# Patient Record
Sex: FEMALE | Race: OTHER | HISPANIC OR LATINO | URBAN - METROPOLITAN AREA
[De-identification: names, ages, dates, MRNs, and addresses within clinical notes are randomized per-mention and may not be internally consistent; named-entity substitution may affect disease eponyms.]

---

## 2018-08-16 ENCOUNTER — OUTPATIENT (OUTPATIENT)
Dept: OUTPATIENT SERVICES | Facility: HOSPITAL | Age: 58
LOS: 1 days | End: 2018-08-16
Payer: COMMERCIAL

## 2018-08-16 VITALS
DIASTOLIC BLOOD PRESSURE: 87 MMHG | SYSTOLIC BLOOD PRESSURE: 135 MMHG | RESPIRATION RATE: 16 BRPM | HEART RATE: 78 BPM | WEIGHT: 194.01 LBS | TEMPERATURE: 96 F | HEIGHT: 63 IN

## 2018-08-16 DIAGNOSIS — Z01.818 ENCOUNTER FOR OTHER PREPROCEDURAL EXAMINATION: ICD-10-CM

## 2018-08-16 DIAGNOSIS — Z29.9 ENCOUNTER FOR PROPHYLACTIC MEASURES, UNSPECIFIED: ICD-10-CM

## 2018-08-16 DIAGNOSIS — Z98.890 OTHER SPECIFIED POSTPROCEDURAL STATES: Chronic | ICD-10-CM

## 2018-08-16 DIAGNOSIS — N92.6 IRREGULAR MENSTRUATION, UNSPECIFIED: ICD-10-CM

## 2018-08-16 LAB
ALBUMIN SERPL ELPH-MCNC: 4.6 G/DL — SIGNIFICANT CHANGE UP (ref 3.3–5.2)
ALP SERPL-CCNC: 79 U/L — SIGNIFICANT CHANGE UP (ref 40–120)
ALT FLD-CCNC: 16 U/L — SIGNIFICANT CHANGE UP
ANION GAP SERPL CALC-SCNC: 14 MMOL/L — SIGNIFICANT CHANGE UP (ref 5–17)
APTT BLD: 32 SEC — SIGNIFICANT CHANGE UP (ref 27.5–37.4)
AST SERPL-CCNC: 18 U/L — SIGNIFICANT CHANGE UP
BILIRUB SERPL-MCNC: 0.4 MG/DL — SIGNIFICANT CHANGE UP (ref 0.4–2)
BUN SERPL-MCNC: 20 MG/DL — SIGNIFICANT CHANGE UP (ref 8–20)
CALCIUM SERPL-MCNC: 9.8 MG/DL — SIGNIFICANT CHANGE UP (ref 8.6–10.2)
CHLORIDE SERPL-SCNC: 101 MMOL/L — SIGNIFICANT CHANGE UP (ref 98–107)
CO2 SERPL-SCNC: 27 MMOL/L — SIGNIFICANT CHANGE UP (ref 22–29)
CREAT SERPL-MCNC: 0.68 MG/DL — SIGNIFICANT CHANGE UP (ref 0.5–1.3)
GLUCOSE SERPL-MCNC: 96 MG/DL — SIGNIFICANT CHANGE UP (ref 70–115)
HCT VFR BLD CALC: 39.3 % — SIGNIFICANT CHANGE UP (ref 37–47)
HGB BLD-MCNC: 12.6 G/DL — SIGNIFICANT CHANGE UP (ref 12–16)
INR BLD: 1.1 RATIO — SIGNIFICANT CHANGE UP (ref 0.88–1.16)
MCHC RBC-ENTMCNC: 27.3 PG — SIGNIFICANT CHANGE UP (ref 27–31)
MCHC RBC-ENTMCNC: 32.1 G/DL — SIGNIFICANT CHANGE UP (ref 32–36)
MCV RBC AUTO: 85.1 FL — SIGNIFICANT CHANGE UP (ref 81–99)
PLATELET # BLD AUTO: 227 K/UL — SIGNIFICANT CHANGE UP (ref 150–400)
POTASSIUM SERPL-MCNC: 4.2 MMOL/L — SIGNIFICANT CHANGE UP (ref 3.5–5.3)
POTASSIUM SERPL-SCNC: 4.2 MMOL/L — SIGNIFICANT CHANGE UP (ref 3.5–5.3)
PROT SERPL-MCNC: 7.9 G/DL — SIGNIFICANT CHANGE UP (ref 6.6–8.7)
PROTHROM AB SERPL-ACNC: 12.1 SEC — SIGNIFICANT CHANGE UP (ref 9.8–12.7)
RBC # BLD: 4.62 M/UL — SIGNIFICANT CHANGE UP (ref 4.4–5.2)
RBC # FLD: 13.9 % — SIGNIFICANT CHANGE UP (ref 11–15.6)
SODIUM SERPL-SCNC: 142 MMOL/L — SIGNIFICANT CHANGE UP (ref 135–145)
WBC # BLD: 7 K/UL — SIGNIFICANT CHANGE UP (ref 4.8–10.8)
WBC # FLD AUTO: 7 K/UL — SIGNIFICANT CHANGE UP (ref 4.8–10.8)

## 2018-08-16 PROCEDURE — 80053 COMPREHEN METABOLIC PANEL: CPT

## 2018-08-16 PROCEDURE — 93005 ELECTROCARDIOGRAM TRACING: CPT

## 2018-08-16 PROCEDURE — 85610 PROTHROMBIN TIME: CPT

## 2018-08-16 PROCEDURE — G0463: CPT

## 2018-08-16 PROCEDURE — 85027 COMPLETE CBC AUTOMATED: CPT

## 2018-08-16 PROCEDURE — 85730 THROMBOPLASTIN TIME PARTIAL: CPT

## 2018-08-16 PROCEDURE — 36415 COLL VENOUS BLD VENIPUNCTURE: CPT

## 2018-08-16 PROCEDURE — 93010 ELECTROCARDIOGRAM REPORT: CPT

## 2018-08-16 NOTE — H&P PST ADULT - NEGATIVE ENMT SYMPTOMS
no dry mouth/no ear pain/no recurrent cold sores/no vertigo/no sinus symptoms/no throat pain/no dysphagia/no nasal congestion/no nasal obstruction/no hearing difficulty/no abnormal taste sensation/no gum bleeding/no tinnitus/no nasal discharge/no post-nasal discharge/no nose bleeds

## 2018-08-16 NOTE — H&P PST ADULT - NEGATIVE NEUROLOGICAL SYMPTOMS
no loss of consciousness/no confusion/no focal seizures/no headache/no tremors/no loss of sensation/no hemiparesis/no weakness/no generalized seizures/no difficulty walking/no transient paralysis/no paresthesias/no syncope/no vertigo/no facial palsy

## 2018-08-16 NOTE — H&P PST ADULT - NEGATIVE PSYCHIATRIC SYMPTOMS
no suicidal ideation/no memory loss/no mood swings/no agitation/no auditory hallucinations/no hyperactivity/no depression/no visual hallucinations/no insomnia/no paranoia/no anxiety

## 2018-08-16 NOTE — H&P PST ADULT - HISTORY OF PRESENT ILLNESS
59 y/o female with c/o post menopausal bleeding and pelvic pain now presents for dilation and curettage

## 2018-08-16 NOTE — H&P PST ADULT - GASTROINTESTINAL DETAILS
soft/no guarding/nontender/no distention/normal/no bruit/no rebound tenderness/no rigidity/no organomegaly/bowel sounds normal/no masses palpable

## 2018-08-16 NOTE — H&P PST ADULT - NEGATIVE GENERAL SYMPTOMS
no weight loss/no sweating/no fatigue/no polydipsia/no polyphagia/no polyuria/no malaise/no chills/no anorexia/no fever/no weight gain

## 2018-08-16 NOTE — H&P PST ADULT - NEGATIVE OPHTHALMOLOGIC SYMPTOMS
no discharge L/no lacrimation L/no irritation L/no loss of vision R/no photophobia/no diplopia/no lacrimation R/no pain R/no loss of vision L/no scleral injection L/no scleral injection R/no irritation R/no blurred vision R/no discharge R/no pain L/no blurred vision L

## 2018-08-16 NOTE — H&P PST ADULT - NEGATIVE MUSCULOSKELETAL SYMPTOMS
no stiffness/no arm pain R/no leg pain R/no arthralgia/no arthritis/no muscle cramps/no leg pain L/no muscle weakness/no neck pain/no back pain/no joint swelling/no myalgia/no arm pain L

## 2018-08-16 NOTE — H&P PST ADULT - NEGATIVE GASTROINTESTINAL SYMPTOMS
no jaundice/no constipation/no diarrhea/no nausea/no abdominal pain/no hematochezia/no vomiting/no flatulence/no steatorrhea/no change in bowel habits

## 2018-08-16 NOTE — H&P PST ADULT - NEGATIVE GENERAL GENITOURINARY SYMPTOMS
no flank pain R/no dysuria/no urinary hesitancy/normal urinary frequency/no flank pain L/no nocturia/no incontinence/no bladder infections/no renal colic/no urine discoloration/no gas in urine/no hematuria

## 2018-08-16 NOTE — H&P PST ADULT - RS GEN PE MLT RESP DETAILS PC
airway patent/clear to auscultation bilaterally/no rhonchi/no intercostal retractions/no rales/respirations non-labored/breath sounds equal/no wheezes/no subcutaneous emphysema/no chest wall tenderness/normal/good air movement

## 2018-08-16 NOTE — H&P PST ADULT - PMH
Esophageal ulcer    GERD (gastroesophageal reflux disease)    H/O hiatal hernia    HTN (hypertension)

## 2018-08-16 NOTE — H&P PST ADULT - ASSESSMENT
postmenopausal bleeding  CAPRINI SCORE    AGE RELATED RISK FACTORS                                                       MOBILITY RELATED FACTORS  [x ] Age 41-60 years                                            (1 Point)                  [ ] Bed rest                                                        (1 Point)  [ ] Age: 61-74 years                                           (2 Points)                [ ] Plaster cast                                                   (2 Points)  [ ] Age= 75 years                                              (3 Points)                 [ ] Bed bound for more than 72 hours                   (2 Points)    DISEASE RELATED RISK FACTORS                                               GENDER SPECIFIC FACTORS  [ ] Edema in the lower extremities                       (1 Point)                  [ ] Pregnancy                                                     (1 Point)  [ ] Varicose veins                                               (1 Point)                  [ ] Post-partum < 6 weeks                                   (1 Point)             [x ] BMI > 25 Kg/m2                                            (1 Point)                  [ ] Hormonal therapy  or oral contraception            (1 Point)                 [ ] Sepsis (in the previous month)                        (1 Point)                  [ ] History of pregnancy complications  [ ] Pneumonia or serious lung disease                                               [ ] Unexplained or recurrent                       (1 Point)           (in the previous month)                               (1 Point)  [ ] Abnormal pulmonary function test                     (1 Point)                 SURGERY RELATED RISK FACTORS  [ ] Acute myocardial infarction                              (1 Point)                 [ ]  Section                                            (1 Point)  [ ] Congestive heart failure (in the previous month)  (1 Point)                 [ x] Minor surgery                                                 (1 Point)   [ ] Inflammatory bowel disease                             (1 Point)                 [ ] Arthroscopic surgery                                        (2 Points)  [ ] Central venous access                                    (2 Points)                [ ] General surgery lasting more than 45 minutes   (2 Points)       [ ] Stroke (in the previous month)                          (5 Points)               [ ] Elective arthroplasty                                        (5 Points)                                                                                                                                               HEMATOLOGY RELATED FACTORS                                                 TRAUMA RELATED RISK FACTORS  [ ] Prior episodes of VTE                                     (3 Points)                 [ ] Fracture of the hip, pelvis, or leg                       (5 Points)  [ ] Positive family history for VTE                         (3 Points)                 [ ] Acute spinal cord injury (in the previous month)  (5 Points)  [ ] Prothrombin 17693 A                                      (3 Points)                 [ ] Paralysis  (less than 1 month)                          (5 Points)  [ ] Factor V Leiden                                             (3 Points)                 [ ] Multiple Trauma within 1 month                         (5 Points)  [ ] Lupus anticoagulants                                     (3 Points)                                                           [ ] Anticardiolipin antibodies                                (3 Points)                                                       [ ] High homocysteine in the blood                      (3 Points)                                             [ ] Other congenital or acquired thrombophilia       (3 Points)                                                [ ] Heparin induced thrombocytopenia                  (3 Points)                                          Total Score [       3   ]

## 2018-08-16 NOTE — H&P PST ADULT - NEGATIVE BREAST SYMPTOMS
no nipple discharge R/no breast tenderness L/no breast tenderness R/no nipple discharge L/no breast lump L/no breast lump R

## 2018-08-16 NOTE — H&P PST ADULT - MUSCULOSKELETAL
details… detailed exam no joint erythema/no joint warmth/no calf tenderness/no joint swelling/normal/ROM intact/normal strength

## 2018-08-16 NOTE — H&P PST ADULT - NEGATIVE SKIN SYMPTOMS
no itching/no brittle nails/no change in size/color of mole/no rash/no dryness/no tumor/no hair loss/no pitted nails

## 2018-08-16 NOTE — H&P PST ADULT - NSANTHOSAYNRD_GEN_A_CORE
No. MONTY screening performed.  STOP BANG Legend: 0-2 = LOW Risk; 3-4 = INTERMEDIATE Risk; 5-8 = HIGH Risk

## 2018-08-16 NOTE — H&P PST ADULT - NEUROLOGICAL DETAILS
alert and oriented x 3/responds to pain/normal strength/responds to verbal commands/deep reflexes intact/no spontaneous movement/superficial reflexes intact/sensation intact/cranial nerves intact

## 2018-08-16 NOTE — H&P PST ADULT - NEGATIVE CARDIOVASCULAR SYMPTOMS
no paroxysmal nocturnal dyspnea/no claudication/no chest pain/no dyspnea on exertion/no orthopnea/no palpitations/no peripheral edema

## 2018-08-21 ENCOUNTER — TRANSCRIPTION ENCOUNTER (OUTPATIENT)
Age: 58
End: 2018-08-21

## 2018-08-22 ENCOUNTER — RESULT REVIEW (OUTPATIENT)
Age: 58
End: 2018-08-22

## 2018-08-22 ENCOUNTER — OUTPATIENT (OUTPATIENT)
Dept: OUTPATIENT SERVICES | Facility: HOSPITAL | Age: 58
LOS: 1 days | Discharge: HOME | End: 2018-08-22
Payer: COMMERCIAL

## 2018-08-22 VITALS
HEART RATE: 56 BPM | OXYGEN SATURATION: 100 % | DIASTOLIC BLOOD PRESSURE: 65 MMHG | RESPIRATION RATE: 16 BRPM | SYSTOLIC BLOOD PRESSURE: 135 MMHG

## 2018-08-22 VITALS
DIASTOLIC BLOOD PRESSURE: 73 MMHG | WEIGHT: 190.04 LBS | RESPIRATION RATE: 16 BRPM | HEART RATE: 58 BPM | HEIGHT: 64 IN | SYSTOLIC BLOOD PRESSURE: 121 MMHG | TEMPERATURE: 98 F | OXYGEN SATURATION: 100 %

## 2018-08-22 DIAGNOSIS — Z98.890 OTHER SPECIFIED POSTPROCEDURAL STATES: Chronic | ICD-10-CM

## 2018-08-22 DIAGNOSIS — Z01.818 ENCOUNTER FOR OTHER PREPROCEDURAL EXAMINATION: ICD-10-CM

## 2018-08-22 DIAGNOSIS — N92.6 IRREGULAR MENSTRUATION, UNSPECIFIED: ICD-10-CM

## 2018-08-22 PROCEDURE — 58120 DILATION AND CURETTAGE: CPT

## 2018-08-22 PROCEDURE — 88305 TISSUE EXAM BY PATHOLOGIST: CPT

## 2018-08-22 PROCEDURE — 88305 TISSUE EXAM BY PATHOLOGIST: CPT | Mod: 26

## 2018-08-22 RX ORDER — KETOROLAC TROMETHAMINE 30 MG/ML
15 SYRINGE (ML) INJECTION ONCE
Qty: 0 | Refills: 0 | Status: DISCONTINUED | OUTPATIENT
Start: 2018-08-22 | End: 2018-08-22

## 2018-08-22 RX ORDER — ONDANSETRON 8 MG/1
4 TABLET, FILM COATED ORAL ONCE
Qty: 0 | Refills: 0 | Status: DISCONTINUED | OUTPATIENT
Start: 2018-08-22 | End: 2018-08-22

## 2018-08-22 RX ORDER — OXYCODONE HYDROCHLORIDE 5 MG/1
5 TABLET ORAL ONCE
Qty: 0 | Refills: 0 | Status: DISCONTINUED | OUTPATIENT
Start: 2018-08-22 | End: 2018-08-22

## 2018-08-22 RX ORDER — RANITIDINE HYDROCHLORIDE 150 MG/1
1 TABLET, FILM COATED ORAL
Qty: 0 | Refills: 0 | COMMUNITY

## 2018-08-22 RX ORDER — SODIUM CHLORIDE 9 MG/ML
1000 INJECTION, SOLUTION INTRAVENOUS
Qty: 0 | Refills: 0 | Status: DISCONTINUED | OUTPATIENT
Start: 2018-08-22 | End: 2018-08-22

## 2018-08-22 RX ORDER — IBUPROFEN 200 MG
1 TABLET ORAL
Qty: 21 | Refills: 1
Start: 2018-08-22 | End: 2018-09-04

## 2018-08-22 RX ORDER — FENTANYL CITRATE 50 UG/ML
50 INJECTION INTRAVENOUS
Qty: 0 | Refills: 0 | Status: DISCONTINUED | OUTPATIENT
Start: 2018-08-22 | End: 2018-08-22

## 2018-08-22 RX ORDER — HYDRALAZINE/HYDROCHLOROTHIAZID 50 MG-50MG
0 CAPSULE ORAL
Qty: 0 | Refills: 0 | COMMUNITY

## 2018-08-22 RX ORDER — FENTANYL CITRATE 50 UG/ML
25 INJECTION INTRAVENOUS
Qty: 0 | Refills: 0 | Status: DISCONTINUED | OUTPATIENT
Start: 2018-08-22 | End: 2018-08-22

## 2018-08-22 NOTE — ASU DISCHARGE PLAN (ADULT/PEDIATRIC). - MEDICATION SUMMARY - MEDICATIONS TO TAKE
I will START or STAY ON the medications listed below when I get home from the hospital:    ibuprofen 600 mg oral tablet  -- 1 tab(s) by mouth every 8 hours  -- Do not take this drug if you are pregnant.  It is very important that you take or use this exactly as directed.  Do not skip doses or discontinue unless directed by your doctor.  May cause drowsiness or dizziness.  Obtain medical advice before taking any non-prescription drugs as some may affect the action of this medication.  Take with food or milk.    -- Indication: For pain    Diovan  mg-12.5 mg oral tablet  -- 1 tab(s) by mouth once a day  -- Indication: For prior    Norvasc 5 mg oral tablet  -- 1 tab(s) by mouth once a day  -- Indication: For prior    hydroCHLOROthiazide 25 mg oral tablet  -- 1 tab(s) by mouth once a day  -- Indication: For prior

## 2018-08-24 LAB — SURGICAL PATHOLOGY FINAL REPORT - CH: SIGNIFICANT CHANGE UP

## 2018-10-24 NOTE — ASU PATIENT PROFILE, ADULT - PATIENT KNOW
Bridgett Albrecht   Preferred Name:   None  Female, 49 year old, 1968  Phone:   *[phone] (Mobile) [phone] (Home Phone) [phone] (Work Phone)    RX   Received: Today   Message Contents   Doe MOREIRA Oac Nurse Review Pool Cc: Doe Hutchinson             Hi,       Pt is scheduled for colonoscopy on 12/10/2018 with Dr. Jones.   [facility] [address] [place] [address] [address]  AT [facility] [place] [address]       Thanks,   Surgery Scheduling Dept         yes

## 2019-09-09 PROBLEM — I10 ESSENTIAL (PRIMARY) HYPERTENSION: Chronic | Status: ACTIVE | Noted: 2018-08-16

## 2019-09-09 PROBLEM — Z87.19 PERSONAL HISTORY OF OTHER DISEASES OF THE DIGESTIVE SYSTEM: Chronic | Status: ACTIVE | Noted: 2018-08-16

## 2019-09-19 ENCOUNTER — APPOINTMENT (OUTPATIENT)
Dept: THORACIC SURGERY | Facility: CLINIC | Age: 59
End: 2019-09-19
Payer: COMMERCIAL

## 2019-09-19 VITALS
OXYGEN SATURATION: 97 % | BODY MASS INDEX: 34.15 KG/M2 | RESPIRATION RATE: 16 BRPM | WEIGHT: 200 LBS | SYSTOLIC BLOOD PRESSURE: 157 MMHG | HEART RATE: 64 BPM | DIASTOLIC BLOOD PRESSURE: 82 MMHG | HEIGHT: 64 IN

## 2019-09-19 DIAGNOSIS — F06.4 ANXIETY DISORDER DUE TO KNOWN PHYSIOLOGICAL CONDITION: ICD-10-CM

## 2019-09-19 DIAGNOSIS — D86.9 SARCOIDOSIS, UNSPECIFIED: ICD-10-CM

## 2019-09-19 DIAGNOSIS — Z87.891 PERSONAL HISTORY OF NICOTINE DEPENDENCE: ICD-10-CM

## 2019-09-19 PROCEDURE — 99205 OFFICE O/P NEW HI 60 MIN: CPT

## 2019-09-23 NOTE — ASSESSMENT
[FreeTextEntry1] : Ms. DAYSI LIU, 59 year old female, former smoker, w/ hx of HTN, anxiety, sarcoidosis, chronic GERD followed by Dr. Lyle, c/o pain when eating, epigastric pain, bloating, N/V and only eating 1 meal a day. Taking Carafate, Pantoprazole and Zantac but without good relief.\par \par EGD on 8/22/19 showed Franco's esophagus and hiatal hernia at EG junction. Path of EG junction revealed columnar mucosa with no significant histopathology. No intestinal cell metaplasia. Alcian blue/PAS stain is negative. \par \par I have reviewed the patient's medical records and diagnostic images at time of this office consultation and have made the following recommendation:\par 1. A CT Chest w/out contrast\par 2. A Manometry Study with Dr. Verito Ramos\par 3. A Barium Swallow\par 4. I recommended an EGD, Robotic-assisted, Lap, repair of hiatal hernia, Nissen fundoplication on 10/9/19. Risks and benefits and alternatives explained to patient, all questions answered, patient agreed to proceed with surgery.\par 5. Medical clearance\par \par \par Written by Meek Jackman NP, acting as a scribe for Dr. Malik Gallagher. \par \par The documentation recorded by the scribe accurately reflects the service I personally performed and the decisions made by me. MALIK GALLAGHER MD\par

## 2019-09-23 NOTE — HISTORY OF PRESENT ILLNESS
[FreeTextEntry1] : Ms. DAYSI LIU, 59 year old female, former smoker, w/ hx of HTN, anxiety, sarcoidosis, chronic GERD followed by Dr. Lyle, c/o pain when eating, epigastric pain, bloating, N/V and only eating 1 meal a day. Taking Carafate, Pantoprazole and Zantac but without good relief.\par \par EGD on 8/22/19 showed Franco's esophagus and hiatal hernia at EG junction. Path of EG junction revealed columnar mucosa with no significant histopathology. No intestinal cell metaplasia. Alcian blue/PAS stain is negative. \par \par Pt presents today for CT Sx consultation, referred by Dr. Renato Lyle.\par

## 2019-09-23 NOTE — CONSULT LETTER
[Dear  ___] : Dear  [unfilled], [Consult Letter:] : I had the pleasure of evaluating your patient, [unfilled]. [( Thank you for referring [unfilled] for consultation for _____ )] : Thank you for referring [unfilled] for consultation for [unfilled] [Please see my note below.] : Please see my note below. [Sincerely,] : Sincerely, [Consult Closing:] : Thank you very much for allowing me to participate in the care of this patient.  If you have any questions, please do not hesitate to contact me. [DrLan  ___] : Dr. WEAVER [DrLan ___] : Dr. WEAVER [FreeTextEntry2] : Dr. Renato Lyle (GI/ref)\par Ivana Lentz MD (PCP)\par Jayla Garcia MD (Hem/Onc) [FreeTextEntry3] : Malik Gallagher MD, MPH \par System Director of Thoracic Surgery \par Director of Comprehensive Lung and Foregut [place] \par Professor Cardiovascular & Thoracic Surgery  \par [facility] [employer] at [facility]\par

## 2019-09-23 NOTE — DATA REVIEWED
[FreeTextEntry1] : EGD on 8/22/19 showed Franco's esophagus and hiatal hernia at EG junction. Path of EG junction revealed columnar mucosa with no significant histopathology. No intestinal cell metaplasia. Alcian blue/PAS stain is negative. \par

## 2019-09-23 NOTE — PHYSICAL EXAM
[General Appearance - In No Acute Distress] : in no acute distress [General Appearance - Alert] : alert [Extraocular Movements] : extraocular movements were intact [PERRL With Normal Accommodation] : pupils were equal in size, round, and reactive to light [Sclera] : the sclera and conjunctiva were normal [Outer Ear] : the ears and nose were normal in appearance [Oropharynx] : the oropharynx was normal [Neck Appearance] : the appearance of the neck was normal [Neck Cervical Mass (___cm)] : no neck mass was observed [Jugular Venous Distention Increased] : there was no jugular-venous distention [Thyroid Nodule] : there were no palpable thyroid nodules [Thyroid Diffuse Enlargement] : the thyroid was not enlarged [Auscultation Breath Sounds / Voice Sounds] : lungs were clear to auscultation bilaterally [Heart Sounds] : normal S1 and S2 [Heart Rate And Rhythm] : heart rate was normal and rhythm regular [Murmurs] : no murmurs [Heart Sounds Gallop] : no gallops [Heart Sounds Pericardial Friction Rub] : no pericardial rub [Chest Visual Inspection Thoracic Asymmetry] : no chest asymmetry [Examination Of The Chest] : the chest was normal in appearance [Diminished Respiratory Excursion] : normal chest expansion [2+] : left 2+ [Breast Appearance] : normal in appearance [Breast Palpation Mass] : no palpable masses [Abdomen Soft] : soft [Bowel Sounds] : normal bowel sounds [Abdomen Tenderness] : non-tender [Abdomen Mass (___ Cm)] : no abdominal mass palpated [Cervical Lymph Nodes Enlarged Posterior Bilaterally] : posterior cervical [Supraclavicular Lymph Nodes Enlarged Bilaterally] : supraclavicular [Cervical Lymph Nodes Enlarged Anterior Bilaterally] : anterior cervical [No CVA Tenderness] : no ~M costovertebral angle tenderness [No Spinal Tenderness] : no spinal tenderness [Abnormal Walk] : normal gait [Musculoskeletal - Swelling] : no joint swelling seen [Nail Clubbing] : no clubbing  or cyanosis of the fingernails [Motor Tone] : muscle strength and tone were normal [Skin Color & Pigmentation] : normal skin color and pigmentation [Skin Turgor] : normal skin turgor [Deep Tendon Reflexes (DTR)] : deep tendon reflexes were 2+ and symmetric [] : no rash [Sensation] : the sensory exam was normal to light touch and pinprick [No Focal Deficits] : no focal deficits [Oriented To Time, Place, And Person] : oriented to person, place, and time [Affect] : the affect was normal [Impaired Insight] : insight and judgment were intact [FreeTextEntry1] : deferred

## 2019-09-27 ENCOUNTER — OTHER (OUTPATIENT)
Age: 59
End: 2019-09-27

## 2019-09-30 ENCOUNTER — OTHER (OUTPATIENT)
Age: 59
End: 2019-09-30

## 2019-09-30 ENCOUNTER — OUTPATIENT (OUTPATIENT)
Dept: OUTPATIENT SERVICES | Facility: HOSPITAL | Age: 59
LOS: 1 days | End: 2019-09-30
Payer: COMMERCIAL

## 2019-09-30 VITALS
HEIGHT: 63 IN | TEMPERATURE: 97 F | OXYGEN SATURATION: 99 % | HEART RATE: 97 BPM | DIASTOLIC BLOOD PRESSURE: 86 MMHG | RESPIRATION RATE: 16 BRPM | WEIGHT: 192.9 LBS | SYSTOLIC BLOOD PRESSURE: 136 MMHG

## 2019-09-30 DIAGNOSIS — Z98.890 OTHER SPECIFIED POSTPROCEDURAL STATES: Chronic | ICD-10-CM

## 2019-09-30 DIAGNOSIS — K44.9 DIAPHRAGMATIC HERNIA WITHOUT OBSTRUCTION OR GANGRENE: ICD-10-CM

## 2019-09-30 DIAGNOSIS — R58 HEMORRHAGE, NOT ELSEWHERE CLASSIFIED: Chronic | ICD-10-CM

## 2019-09-30 DIAGNOSIS — K21.9 GASTRO-ESOPHAGEAL REFLUX DISEASE WITHOUT ESOPHAGITIS: ICD-10-CM

## 2019-09-30 DIAGNOSIS — Z01.818 ENCOUNTER FOR OTHER PREPROCEDURAL EXAMINATION: ICD-10-CM

## 2019-09-30 LAB
ALBUMIN SERPL ELPH-MCNC: 4.6 G/DL — SIGNIFICANT CHANGE UP (ref 3.3–5)
ALP SERPL-CCNC: 77 U/L — SIGNIFICANT CHANGE UP (ref 40–120)
ALT FLD-CCNC: 9 U/L — SIGNIFICANT CHANGE UP (ref 4–33)
ANION GAP SERPL CALC-SCNC: 15 MMO/L — HIGH (ref 7–14)
AST SERPL-CCNC: 10 U/L — SIGNIFICANT CHANGE UP (ref 4–32)
BILIRUB DIRECT SERPL-MCNC: < 0.2 MG/DL — SIGNIFICANT CHANGE UP (ref 0.1–0.2)
BILIRUB SERPL-MCNC: 0.3 MG/DL — SIGNIFICANT CHANGE UP (ref 0.2–1.2)
BLD GP AB SCN SERPL QL: NEGATIVE — SIGNIFICANT CHANGE UP
BUN SERPL-MCNC: 11 MG/DL — SIGNIFICANT CHANGE UP (ref 7–23)
CALCIUM SERPL-MCNC: 9.5 MG/DL — SIGNIFICANT CHANGE UP (ref 8.4–10.5)
CHLORIDE SERPL-SCNC: 106 MMOL/L — SIGNIFICANT CHANGE UP (ref 98–107)
CO2 SERPL-SCNC: 24 MMOL/L — SIGNIFICANT CHANGE UP (ref 22–31)
CREAT SERPL-MCNC: 0.65 MG/DL — SIGNIFICANT CHANGE UP (ref 0.5–1.3)
GLUCOSE SERPL-MCNC: 76 MG/DL — SIGNIFICANT CHANGE UP (ref 70–99)
HBA1C BLD-MCNC: 5.6 % — SIGNIFICANT CHANGE UP (ref 4–5.6)
HCT VFR BLD CALC: 38.6 % — SIGNIFICANT CHANGE UP (ref 34.5–45)
HGB BLD-MCNC: 12.2 G/DL — SIGNIFICANT CHANGE UP (ref 11.5–15.5)
MCHC RBC-ENTMCNC: 27.4 PG — SIGNIFICANT CHANGE UP (ref 27–34)
MCHC RBC-ENTMCNC: 31.6 % — LOW (ref 32–36)
MCV RBC AUTO: 86.5 FL — SIGNIFICANT CHANGE UP (ref 80–100)
NRBC # FLD: 0 K/UL — SIGNIFICANT CHANGE UP (ref 0–0)
PLATELET # BLD AUTO: 219 K/UL — SIGNIFICANT CHANGE UP (ref 150–400)
PMV BLD: 9.7 FL — SIGNIFICANT CHANGE UP (ref 7–13)
POTASSIUM SERPL-MCNC: 4.1 MMOL/L — SIGNIFICANT CHANGE UP (ref 3.5–5.3)
POTASSIUM SERPL-SCNC: 4.1 MMOL/L — SIGNIFICANT CHANGE UP (ref 3.5–5.3)
PROT SERPL-MCNC: 7.5 G/DL — SIGNIFICANT CHANGE UP (ref 6–8.3)
RBC # BLD: 4.46 M/UL — SIGNIFICANT CHANGE UP (ref 3.8–5.2)
RBC # FLD: 13.9 % — SIGNIFICANT CHANGE UP (ref 10.3–14.5)
RH IG SCN BLD-IMP: POSITIVE — SIGNIFICANT CHANGE UP
SODIUM SERPL-SCNC: 145 MMOL/L — SIGNIFICANT CHANGE UP (ref 135–145)
WBC # BLD: 7.68 K/UL — SIGNIFICANT CHANGE UP (ref 3.8–10.5)
WBC # FLD AUTO: 7.68 K/UL — SIGNIFICANT CHANGE UP (ref 3.8–10.5)

## 2019-09-30 PROCEDURE — 93010 ELECTROCARDIOGRAM REPORT: CPT

## 2019-09-30 RX ORDER — AMLODIPINE BESYLATE 2.5 MG/1
1 TABLET ORAL
Qty: 0 | Refills: 0 | DISCHARGE

## 2019-09-30 NOTE — H&P PST ADULT - NSICDXPASTSURGICALHX_GEN_ALL_CORE_FT
PAST SURGICAL HISTORY:  Bleeding uterine ablation    H/O thyroid cyst     S/P bilateral breast reduction     S/P  Section X2

## 2019-09-30 NOTE — H&P PST ADULT - NSICDXPASTMEDICALHX_GEN_ALL_CORE_FT
PAST MEDICAL HISTORY:  Franco's esophagus     Esophageal ulcer     GERD (gastroesophageal reflux disease)     H/O hiatal hernia     HTN (hypertension)     Miscarriage x 2

## 2019-09-30 NOTE — H&P PST ADULT - HISTORY OF PRESENT ILLNESS
This is a 60 y/o female who presents with h/o robles's esophagus and progressively worsening symptomatology of hiatal hernia confirmed on endoscopy. Scheduled for upper endoscopy, robotic assisted lap hiatal hernia repair, Nissen fundoplication on 10-9-10

## 2019-09-30 NOTE — H&P PST ADULT - NSICDXPROBLEM_GEN_ALL_CORE_FT
PROBLEM DIAGNOSES  Problem: Chronic GERD  Assessment and Plan: PROBLEM DIAGNOSES  Problem: Chronic GERD  Assessment and Plan: This is a 58 y/o female who is scheduled for upper endoscopy, robotic assisted lap hiatal hernia repair Nissen fundoplication on 10-9-19  * Given preop and cleanser instructions with good teach back and patient verbalized understanding  * Instructed to take normal am dose of amlodipine, valsartan, and ranitidine the am of surgery

## 2019-10-04 ENCOUNTER — APPOINTMENT (OUTPATIENT)
Dept: GASTROENTEROLOGY | Facility: HOSPITAL | Age: 59
End: 2019-10-04

## 2019-10-04 ENCOUNTER — OUTPATIENT (OUTPATIENT)
Dept: OUTPATIENT SERVICES | Facility: HOSPITAL | Age: 59
LOS: 1 days | Discharge: HOME | End: 2019-10-04
Payer: COMMERCIAL

## 2019-10-04 DIAGNOSIS — R58 HEMORRHAGE, NOT ELSEWHERE CLASSIFIED: Chronic | ICD-10-CM

## 2019-10-04 DIAGNOSIS — Z98.890 OTHER SPECIFIED POSTPROCEDURAL STATES: Chronic | ICD-10-CM

## 2019-10-04 DIAGNOSIS — K44.9 DIAPHRAGMATIC HERNIA WITHOUT OBSTRUCTION OR GANGRENE: ICD-10-CM

## 2019-10-04 PROCEDURE — 91037 ESOPH IMPED FUNCTION TEST: CPT | Mod: 26,GC

## 2019-10-04 PROCEDURE — 91010 ESOPHAGUS MOTILITY STUDY: CPT | Mod: 26,GC

## 2019-10-08 ENCOUNTER — TRANSCRIPTION ENCOUNTER (OUTPATIENT)
Age: 59
End: 2019-10-08

## 2019-10-08 ENCOUNTER — FORM ENCOUNTER (OUTPATIENT)
Age: 59
End: 2019-10-08

## 2019-10-09 ENCOUNTER — INPATIENT (INPATIENT)
Facility: HOSPITAL | Age: 59
LOS: 0 days | Discharge: HOME | End: 2019-10-10
Attending: THORACIC SURGERY (CARDIOTHORACIC VASCULAR SURGERY) | Admitting: THORACIC SURGERY (CARDIOTHORACIC VASCULAR SURGERY)
Payer: COMMERCIAL

## 2019-10-09 ENCOUNTER — RESULT REVIEW (OUTPATIENT)
Age: 59
End: 2019-10-09

## 2019-10-09 ENCOUNTER — TRANSCRIPTION ENCOUNTER (OUTPATIENT)
Age: 59
End: 2019-10-09

## 2019-10-09 ENCOUNTER — APPOINTMENT (OUTPATIENT)
Dept: THORACIC SURGERY | Facility: HOSPITAL | Age: 59
End: 2019-10-09

## 2019-10-09 VITALS
WEIGHT: 192.9 LBS | DIASTOLIC BLOOD PRESSURE: 70 MMHG | OXYGEN SATURATION: 98 % | SYSTOLIC BLOOD PRESSURE: 137 MMHG | HEIGHT: 63 IN | HEART RATE: 68 BPM | RESPIRATION RATE: 16 BRPM | TEMPERATURE: 98 F

## 2019-10-09 DIAGNOSIS — K44.9 DIAPHRAGMATIC HERNIA WITHOUT OBSTRUCTION OR GANGRENE: ICD-10-CM

## 2019-10-09 DIAGNOSIS — Z98.890 OTHER SPECIFIED POSTPROCEDURAL STATES: Chronic | ICD-10-CM

## 2019-10-09 DIAGNOSIS — R58 HEMORRHAGE, NOT ELSEWHERE CLASSIFIED: Chronic | ICD-10-CM

## 2019-10-09 LAB — RH IG SCN BLD-IMP: POSITIVE — SIGNIFICANT CHANGE UP

## 2019-10-09 PROCEDURE — 74220 X-RAY XM ESOPHAGUS 1CNTRST: CPT | Mod: 26

## 2019-10-09 PROCEDURE — S2900 ROBOTIC SURGICAL SYSTEM: CPT | Mod: NC

## 2019-10-09 PROCEDURE — 71045 X-RAY EXAM CHEST 1 VIEW: CPT | Mod: 26

## 2019-10-09 PROCEDURE — 43281 LAP PARAESOPHAG HERN REPAIR: CPT

## 2019-10-09 PROCEDURE — 43235 EGD DIAGNOSTIC BRUSH WASH: CPT | Mod: 59

## 2019-10-09 PROCEDURE — 88302 TISSUE EXAM BY PATHOLOGIST: CPT | Mod: 26

## 2019-10-09 RX ORDER — METOCLOPRAMIDE HCL 10 MG
10 TABLET ORAL ONCE
Refills: 0 | Status: COMPLETED | OUTPATIENT
Start: 2019-10-09 | End: 2019-10-09

## 2019-10-09 RX ORDER — SODIUM CHLORIDE 9 MG/ML
1000 INJECTION, SOLUTION INTRAVENOUS
Refills: 0 | Status: DISCONTINUED | OUTPATIENT
Start: 2019-10-09 | End: 2019-10-09

## 2019-10-09 RX ORDER — ONDANSETRON 8 MG/1
4 TABLET, FILM COATED ORAL ONCE
Refills: 0 | Status: COMPLETED | OUTPATIENT
Start: 2019-10-09 | End: 2019-10-09

## 2019-10-09 RX ORDER — OXYCODONE HYDROCHLORIDE 5 MG/1
10 TABLET ORAL EVERY 4 HOURS
Refills: 0 | Status: DISCONTINUED | OUTPATIENT
Start: 2019-10-09 | End: 2019-10-10

## 2019-10-09 RX ORDER — OXYCODONE HYDROCHLORIDE 5 MG/1
5 TABLET ORAL EVERY 4 HOURS
Refills: 0 | Status: DISCONTINUED | OUTPATIENT
Start: 2019-10-09 | End: 2019-10-10

## 2019-10-09 RX ORDER — ACETAMINOPHEN 500 MG
1000 TABLET ORAL ONCE
Refills: 0 | Status: COMPLETED | OUTPATIENT
Start: 2019-10-09 | End: 2019-10-09

## 2019-10-09 RX ORDER — FENTANYL CITRATE 50 UG/ML
25 INJECTION INTRAVENOUS
Refills: 0 | Status: DISCONTINUED | OUTPATIENT
Start: 2019-10-09 | End: 2019-10-09

## 2019-10-09 RX ORDER — SODIUM CHLORIDE 9 MG/ML
1000 INJECTION, SOLUTION INTRAVENOUS
Refills: 0 | Status: DISCONTINUED | OUTPATIENT
Start: 2019-10-09 | End: 2019-10-10

## 2019-10-09 RX ORDER — HEPARIN SODIUM 5000 [USP'U]/ML
5000 INJECTION INTRAVENOUS; SUBCUTANEOUS EVERY 12 HOURS
Refills: 0 | Status: DISCONTINUED | OUTPATIENT
Start: 2019-10-09 | End: 2019-10-10

## 2019-10-09 RX ORDER — HYDROMORPHONE HYDROCHLORIDE 2 MG/ML
0.5 INJECTION INTRAMUSCULAR; INTRAVENOUS; SUBCUTANEOUS
Refills: 0 | Status: DISCONTINUED | OUTPATIENT
Start: 2019-10-09 | End: 2019-10-09

## 2019-10-09 RX ORDER — HEPARIN SODIUM 5000 [USP'U]/ML
5000 INJECTION INTRAVENOUS; SUBCUTANEOUS ONCE
Refills: 0 | Status: COMPLETED | OUTPATIENT
Start: 2019-10-09 | End: 2019-10-09

## 2019-10-09 RX ORDER — AMLODIPINE BESYLATE 2.5 MG/1
5 TABLET ORAL DAILY
Refills: 0 | Status: DISCONTINUED | OUTPATIENT
Start: 2019-10-10 | End: 2019-10-10

## 2019-10-09 RX ORDER — SIMETHICONE 80 MG/1
80 TABLET, CHEWABLE ORAL THREE TIMES A DAY
Refills: 0 | Status: DISCONTINUED | OUTPATIENT
Start: 2019-10-09 | End: 2019-10-10

## 2019-10-09 RX ADMIN — ONDANSETRON 4 MILLIGRAM(S): 8 TABLET, FILM COATED ORAL at 18:36

## 2019-10-09 RX ADMIN — HYDROMORPHONE HYDROCHLORIDE 0.5 MILLIGRAM(S): 2 INJECTION INTRAMUSCULAR; INTRAVENOUS; SUBCUTANEOUS at 14:00

## 2019-10-09 RX ADMIN — ONDANSETRON 4 MILLIGRAM(S): 8 TABLET, FILM COATED ORAL at 14:25

## 2019-10-09 RX ADMIN — Medication 10 MILLIGRAM(S): at 20:18

## 2019-10-09 RX ADMIN — SIMETHICONE 80 MILLIGRAM(S): 80 TABLET, CHEWABLE ORAL at 18:36

## 2019-10-09 RX ADMIN — Medication 1000 MILLIGRAM(S): at 21:19

## 2019-10-09 RX ADMIN — SIMETHICONE 80 MILLIGRAM(S): 80 TABLET, CHEWABLE ORAL at 22:49

## 2019-10-09 RX ADMIN — HYDROMORPHONE HYDROCHLORIDE 0.5 MILLIGRAM(S): 2 INJECTION INTRAMUSCULAR; INTRAVENOUS; SUBCUTANEOUS at 13:35

## 2019-10-09 RX ADMIN — OXYCODONE HYDROCHLORIDE 10 MILLIGRAM(S): 5 TABLET ORAL at 18:36

## 2019-10-09 RX ADMIN — Medication 400 MILLIGRAM(S): at 20:19

## 2019-10-09 RX ADMIN — SODIUM CHLORIDE 80 MILLILITER(S): 9 INJECTION, SOLUTION INTRAVENOUS at 13:36

## 2019-10-09 RX ADMIN — HEPARIN SODIUM 5000 UNIT(S): 5000 INJECTION INTRAVENOUS; SUBCUTANEOUS at 09:54

## 2019-10-09 RX ADMIN — SODIUM CHLORIDE 30 MILLILITER(S): 9 INJECTION, SOLUTION INTRAVENOUS at 09:55

## 2019-10-09 NOTE — DISCHARGE NOTE PROVIDER - INSTRUCTIONS
Clear liquid diet  No carbonated beverages Clear liquid diet x 2 more days  then progress to full liquid diet x 3 days  and then progress to soft mechanical diet until f/u with Dr Gallagher    No carbonated beverages  keep head of bed straight up during and after eating  keep head of bed 45* when sleeping  if you experience difficulty swallowing got back to clears and contact thoracic surgery office

## 2019-10-09 NOTE — DISCHARGE NOTE PROVIDER - CARE PROVIDER_API CALL
Malik Gallagher (MD)  Surgery; Thoracic Surgery  [id number] [place] [address]  Phone: [phone]  Fax: [phone]  Follow Up Time:

## 2019-10-09 NOTE — PROGRESS NOTE ADULT - ASSESSMENT
Patient s/p Robotic assisted hiatal hernia repair and nissen fundoplication    Plan:  Pain management- IV Tylenol   Clear liquid diet  IV hydration   Zofran/Reglan for nausea   D/C home in am with adequate pain and nausea management

## 2019-10-09 NOTE — DISCHARGE NOTE PROVIDER - NSDCCPTREATMENT_GEN_ALL_CORE_FT
PRINCIPAL PROCEDURE  Procedure: Repair, hernia, hiatal, with fundoplication  Findings and Treatment:

## 2019-10-09 NOTE — DISCHARGE NOTE PROVIDER - NSDCCPCAREPLAN_GEN_ALL_CORE_FT
PRINCIPAL DISCHARGE DIAGNOSIS  Diagnosis: Franco esophagus  Assessment and Plan of Treatment: S/p Robotic assisted hiatal hernia repair, nissen fundoplication

## 2019-10-09 NOTE — DISCHARGE NOTE PROVIDER - HOSPITAL COURSE
60 y/o female who presents with h/o robles's esophagus and progressively worsening symptomatology of hiatal hernia confirmed on endoscopy.     Patient s/p robotic assisted hiatal hernia repair, nissen fundoplication on 10/9/19, esophragam done and patient started on clear liquid diet.  Post op course patient with post op pain not controlled with po pain medication and nausea requiring IV Zofran and Reglan.  Patient stable for discharge home when nausea resolves and pain managed with po pain medication.

## 2019-10-09 NOTE — PROGRESS NOTE ADULT - SUBJECTIVE AND OBJECTIVE BOX
Patient s/p robotic assisted hiatal hernia repair, nissen fundoplication.  Patient complaining of incisional pain, oxycodone po given with minimal relief noted, patient then received IV Tylenol with pain relief verbalized.  Esophagram done and patient advanced to clear liquid diet, she states after having clear liquid she felt nauseated, IV zofran given with minimal relief, patient remained nauseated and IV Reglan given with relief noted.  Patient continues on IV hydration since she feels nausea with liquids and patient not taking adequate po intake.    Vital Signs Last 24 Hrs  T(C): 36.8 (09 Oct 2019 18:14), Max: 36.8 (09 Oct 2019 18:14)  T(F): 98.2 (09 Oct 2019 18:14), Max: 98.2 (09 Oct 2019 18:14)  HR: 83 (09 Oct 2019 18:14) (56 - 89)  BP: 162/85 (09 Oct 2019 18:14) (108/55 - 162/85)  BP(mean): 85 (09 Oct 2019 16:30) (68 - 107)  RR: 17 (09 Oct 2019 18:14) (8 - 22)  SpO2: 100% (09 Oct 2019 18:14) (93% - 100%)    MEDICATIONS  (STANDING):  lactated ringers. 1000 milliLiter(s) (50 mL/Hr) IV Continuous <Continuous>  simethicone 80 milliGRAM(s) Chew three times a day  MEDICATIONS  (PRN):  oxyCODONE    IR 5 milliGRAM(s) Oral every 4 hours PRN Moderate Pain (4 - 6)  oxyCODONE    IR 10 milliGRAM(s) Oral every 4 hours PRN Severe Pain (7 - 10)

## 2019-10-09 NOTE — DISCHARGE NOTE PROVIDER - NSDCACTIVITY_GEN_ALL_CORE
Showering allowed/Stairs allowed/Walking - Indoors allowed/Do not drive or operate machinery/Do not make important decisions/No heavy lifting/straining

## 2019-10-09 NOTE — PATIENT PROFILE ADULT - NSPROPOAURINARYCATHETER_GEN_A_NUR
How Severe Is Your Skin Lesion?: mild Have Your Skin Lesions Been Treated?: not been treated Is This A New Presentation, Or A Follow-Up?: Skin Lesions no

## 2019-10-10 ENCOUNTER — TRANSCRIPTION ENCOUNTER (OUTPATIENT)
Age: 59
End: 2019-10-10

## 2019-10-10 VITALS
HEART RATE: 80 BPM | TEMPERATURE: 98 F | RESPIRATION RATE: 18 BRPM | DIASTOLIC BLOOD PRESSURE: 80 MMHG | OXYGEN SATURATION: 98 % | SYSTOLIC BLOOD PRESSURE: 161 MMHG

## 2019-10-10 LAB
ANION GAP SERPL CALC-SCNC: 15 MMO/L — HIGH (ref 7–14)
BASOPHILS # BLD AUTO: 0.01 K/UL — SIGNIFICANT CHANGE UP (ref 0–0.2)
BASOPHILS NFR BLD AUTO: 0.1 % — SIGNIFICANT CHANGE UP (ref 0–2)
BUN SERPL-MCNC: 10 MG/DL — SIGNIFICANT CHANGE UP (ref 7–23)
CALCIUM SERPL-MCNC: 9.2 MG/DL — SIGNIFICANT CHANGE UP (ref 8.4–10.5)
CHLORIDE SERPL-SCNC: 105 MMOL/L — SIGNIFICANT CHANGE UP (ref 98–107)
CO2 SERPL-SCNC: 20 MMOL/L — LOW (ref 22–31)
CREAT SERPL-MCNC: 0.67 MG/DL — SIGNIFICANT CHANGE UP (ref 0.5–1.3)
EOSINOPHIL # BLD AUTO: 0 K/UL — SIGNIFICANT CHANGE UP (ref 0–0.5)
EOSINOPHIL NFR BLD AUTO: 0 % — SIGNIFICANT CHANGE UP (ref 0–6)
GLUCOSE SERPL-MCNC: 110 MG/DL — HIGH (ref 70–99)
HCT VFR BLD CALC: 40.6 % — SIGNIFICANT CHANGE UP (ref 34.5–45)
HGB BLD-MCNC: 12.6 G/DL — SIGNIFICANT CHANGE UP (ref 11.5–15.5)
IMM GRANULOCYTES NFR BLD AUTO: 0.6 % — SIGNIFICANT CHANGE UP (ref 0–1.5)
LYMPHOCYTES # BLD AUTO: 1.67 K/UL — SIGNIFICANT CHANGE UP (ref 1–3.3)
LYMPHOCYTES # BLD AUTO: 15.7 % — SIGNIFICANT CHANGE UP (ref 13–44)
MCHC RBC-ENTMCNC: 26.5 PG — LOW (ref 27–34)
MCHC RBC-ENTMCNC: 31 % — LOW (ref 32–36)
MCV RBC AUTO: 85.3 FL — SIGNIFICANT CHANGE UP (ref 80–100)
MONOCYTES # BLD AUTO: 0.55 K/UL — SIGNIFICANT CHANGE UP (ref 0–0.9)
MONOCYTES NFR BLD AUTO: 5.2 % — SIGNIFICANT CHANGE UP (ref 2–14)
NEUTROPHILS # BLD AUTO: 8.37 K/UL — HIGH (ref 1.8–7.4)
NEUTROPHILS NFR BLD AUTO: 78.4 % — HIGH (ref 43–77)
NRBC # FLD: 0 K/UL — SIGNIFICANT CHANGE UP (ref 0–0)
PLATELET # BLD AUTO: 281 K/UL — SIGNIFICANT CHANGE UP (ref 150–400)
PMV BLD: 10.2 FL — SIGNIFICANT CHANGE UP (ref 7–13)
POTASSIUM SERPL-MCNC: 4 MMOL/L — SIGNIFICANT CHANGE UP (ref 3.5–5.3)
POTASSIUM SERPL-SCNC: 4 MMOL/L — SIGNIFICANT CHANGE UP (ref 3.5–5.3)
RBC # BLD: 4.76 M/UL — SIGNIFICANT CHANGE UP (ref 3.8–5.2)
RBC # FLD: 13.7 % — SIGNIFICANT CHANGE UP (ref 10.3–14.5)
SODIUM SERPL-SCNC: 140 MMOL/L — SIGNIFICANT CHANGE UP (ref 135–145)
WBC # BLD: 10.66 K/UL — HIGH (ref 3.8–10.5)
WBC # FLD AUTO: 10.66 K/UL — HIGH (ref 3.8–10.5)

## 2019-10-10 PROCEDURE — 71045 X-RAY EXAM CHEST 1 VIEW: CPT | Mod: 26

## 2019-10-10 RX ORDER — OXYCODONE HYDROCHLORIDE 5 MG/1
1 TABLET ORAL
Qty: 30 | Refills: 0
Start: 2019-10-10 | End: 2019-10-14

## 2019-10-10 RX ORDER — RANITIDINE HYDROCHLORIDE 150 MG/1
1 TABLET, FILM COATED ORAL
Qty: 0 | Refills: 0 | DISCHARGE

## 2019-10-10 RX ORDER — SIMETHICONE 80 MG/1
1 TABLET, CHEWABLE ORAL
Qty: 0 | Refills: 0 | DISCHARGE
Start: 2019-10-10

## 2019-10-10 RX ORDER — ACETAMINOPHEN 500 MG
2 TABLET ORAL
Qty: 0 | Refills: 0 | DISCHARGE
Start: 2019-10-10

## 2019-10-10 RX ORDER — ACETAMINOPHEN 500 MG
650 TABLET ORAL EVERY 6 HOURS
Refills: 0 | Status: DISCONTINUED | OUTPATIENT
Start: 2019-10-10 | End: 2019-10-10

## 2019-10-10 RX ADMIN — SIMETHICONE 80 MILLIGRAM(S): 80 TABLET, CHEWABLE ORAL at 05:25

## 2019-10-10 RX ADMIN — OXYCODONE HYDROCHLORIDE 10 MILLIGRAM(S): 5 TABLET ORAL at 14:11

## 2019-10-10 RX ADMIN — Medication 650 MILLIGRAM(S): at 09:11

## 2019-10-10 RX ADMIN — Medication 650 MILLIGRAM(S): at 09:12

## 2019-10-10 RX ADMIN — AMLODIPINE BESYLATE 5 MILLIGRAM(S): 2.5 TABLET ORAL at 05:25

## 2019-10-10 RX ADMIN — SIMETHICONE 80 MILLIGRAM(S): 80 TABLET, CHEWABLE ORAL at 14:11

## 2019-10-10 RX ADMIN — HEPARIN SODIUM 5000 UNIT(S): 5000 INJECTION INTRAVENOUS; SUBCUTANEOUS at 05:25

## 2019-10-10 RX ADMIN — OXYCODONE HYDROCHLORIDE 5 MILLIGRAM(S): 5 TABLET ORAL at 05:25

## 2019-10-10 RX ADMIN — OXYCODONE HYDROCHLORIDE 5 MILLIGRAM(S): 5 TABLET ORAL at 07:16

## 2019-10-10 NOTE — DISCHARGE NOTE NURSING/CASE MANAGEMENT/SOCIAL WORK - NSDCPNINST_GEN_ALL_CORE
pt educated on maintaining clear liquid diet and when to follow up with surgeon, demonstrates good understanding. if you experience pain not relieved by medication, a fever greater than 100.4, nausea or vomiting, call your doctor or come back to the emergency room, pt educated on maintaining clear liquid diet, advancing to full then mechanical soft. printouts on diet given to patient. educated on when to follow up with surgeon, demonstrates good understanding. if you experience pain not relieved by medication, a fever greater than 100.4, nausea or vomiting, call your doctor or come back to the emergency room,

## 2019-10-10 NOTE — DISCHARGE NOTE NURSING/CASE MANAGEMENT/SOCIAL WORK - PATIENT PORTAL LINK FT
You can access the FollowMyHealth Patient Portal offered by [facility] by registering at the following website: [email or web address]. By joining [employer]’s FollowMyHealth portal, you will also be able to view your health information using other applications (apps) compatible with our system.

## 2019-10-11 PROBLEM — K22.70 BARRETT'S ESOPHAGUS WITHOUT DYSPLASIA: Chronic | Status: ACTIVE | Noted: 2019-09-30

## 2019-10-11 PROBLEM — O03.9 COMPLETE OR UNSPECIFIED SPONTANEOUS ABORTION WITHOUT COMPLICATION: Chronic | Status: ACTIVE | Noted: 2019-09-30

## 2019-10-24 ENCOUNTER — APPOINTMENT (OUTPATIENT)
Dept: THORACIC SURGERY | Facility: CLINIC | Age: 59
End: 2019-10-24
Payer: COMMERCIAL

## 2019-10-24 PROCEDURE — 99024 POSTOP FOLLOW-UP VISIT: CPT

## 2019-10-25 VITALS
DIASTOLIC BLOOD PRESSURE: 79 MMHG | OXYGEN SATURATION: 97 % | HEART RATE: 62 BPM | SYSTOLIC BLOOD PRESSURE: 164 MMHG | BODY MASS INDEX: 31.76 KG/M2 | WEIGHT: 185 LBS | RESPIRATION RATE: 18 BRPM | TEMPERATURE: 98 F

## 2019-10-25 RX ORDER — PANTOPRAZOLE 40 MG/1
40 TABLET, DELAYED RELEASE ORAL
Refills: 0 | Status: COMPLETED | COMMUNITY
End: 2019-10-25

## 2019-10-25 RX ORDER — RANITIDINE 300 MG/1
300 TABLET ORAL
Refills: 0 | Status: COMPLETED | COMMUNITY
End: 2019-10-25

## 2019-12-11 ENCOUNTER — FORM ENCOUNTER (OUTPATIENT)
Age: 59
End: 2019-12-11

## 2019-12-12 ENCOUNTER — APPOINTMENT (OUTPATIENT)
Dept: THORACIC SURGERY | Facility: CLINIC | Age: 59
End: 2019-12-12
Payer: COMMERCIAL

## 2019-12-12 ENCOUNTER — OUTPATIENT (OUTPATIENT)
Dept: OUTPATIENT SERVICES | Facility: HOSPITAL | Age: 59
LOS: 1 days | End: 2019-12-12
Payer: COMMERCIAL

## 2019-12-12 ENCOUNTER — APPOINTMENT (OUTPATIENT)
Dept: RADIOLOGY | Facility: HOSPITAL | Age: 59
End: 2019-12-12

## 2019-12-12 VITALS
BODY MASS INDEX: 31.76 KG/M2 | HEART RATE: 59 BPM | SYSTOLIC BLOOD PRESSURE: 166 MMHG | OXYGEN SATURATION: 99 % | DIASTOLIC BLOOD PRESSURE: 85 MMHG | RESPIRATION RATE: 16 BRPM | WEIGHT: 185 LBS

## 2019-12-12 DIAGNOSIS — Z98.890 OTHER SPECIFIED POSTPROCEDURAL STATES: Chronic | ICD-10-CM

## 2019-12-12 DIAGNOSIS — R58 HEMORRHAGE, NOT ELSEWHERE CLASSIFIED: Chronic | ICD-10-CM

## 2019-12-12 DIAGNOSIS — R91.8 OTHER NONSPECIFIC ABNORMAL FINDING OF LUNG FIELD: ICD-10-CM

## 2019-12-12 PROCEDURE — 71046 X-RAY EXAM CHEST 2 VIEWS: CPT | Mod: 26

## 2019-12-12 PROCEDURE — 99024 POSTOP FOLLOW-UP VISIT: CPT

## 2020-02-05 ENCOUNTER — APPOINTMENT (OUTPATIENT)
Dept: GASTROENTEROLOGY | Facility: CLINIC | Age: 60
End: 2020-02-05
Payer: COMMERCIAL

## 2020-02-05 VITALS
SYSTOLIC BLOOD PRESSURE: 140 MMHG | RESPIRATION RATE: 15 BRPM | OXYGEN SATURATION: 98 % | HEIGHT: 64 IN | DIASTOLIC BLOOD PRESSURE: 80 MMHG | BODY MASS INDEX: 32.78 KG/M2 | WEIGHT: 192 LBS | HEART RATE: 59 BPM

## 2020-02-05 DIAGNOSIS — Z87.19 PERSONAL HISTORY OF OTHER DISEASES OF THE DIGESTIVE SYSTEM: ICD-10-CM

## 2020-02-05 PROCEDURE — 99203 OFFICE O/P NEW LOW 30 MIN: CPT

## 2020-02-05 NOTE — ASSESSMENT
[FreeTextEntry1] : 59 year odl female with Franco's esophagus referred for advanced imaging. \par \par I discussed the proposed EGD with VLE/NBI/WATS-3D with the patient.  I discussed the risks (bleeding, infection, perforation, pancreatitis, and anesthesia risks), benefits, and alternatives  with the patient. The patient agrees to proceed. \par \par Thank you for involving me in their care.\par \par Mal Edwards MD\par Director of Endoscopy\par [facility]\par [facility]\par Phone: [phone]\par Fax [phone]\par

## 2020-02-05 NOTE — HISTORY OF PRESENT ILLNESS
[de-identified] : 59 year old female s/p hiatal hernia repair by Dr Gallagher, referred for evaluation of her Franco's with advanced imaging.\par \par Patient feeling well post HH repair. No complaints.\par \par She had a previous EGD showing short segment BE. No known dysplasia. \par \par

## 2020-02-05 NOTE — CONSULT LETTER
[Dear  ___] : Dear  [unfilled], [Courtesy Letter:] : I had the pleasure of seeing your patient, [unfilled], in my office today. [Please see my note below.] : Please see my note below. [Consult Closing:] : Thank you very much for allowing me to participate in the care of this patient.  If you have any questions, please do not hesitate to contact me. [Sincerely,] : Sincerely, [FreeTextEntry2] : Malik Gallagher MD\par [phone]\par Div Thoracic Surgery\[place] [address] [FreeTextEntry3] : --\par Mal Edwards MD, TUCKER\par Director of Endoscopy\par [facility]\par Associate Professor of Medicine\par [facility] [employer] at [facility]\par

## 2020-02-05 NOTE — END OF VISIT
DISPLAY PLAN FREE TEXT [>50% of Time Spent on Counseling and Coordination of Care for  ___] : Greater than 50% of the encounter time was spent on counseling and coordination of care for [unfilled] [Time Spent: ___ minutes] : I have spent [unfilled] minutes of face to face time with the patient

## 2020-02-05 NOTE — PHYSICAL EXAM
[General Appearance - Alert] : alert [General Appearance - In No Acute Distress] : in no acute distress [Sclera] : the sclera and conjunctiva were normal [Outer Ear] : the ears and nose were normal in appearance [Neck Appearance] : the appearance of the neck was normal [] : no respiratory distress [Apical Impulse] : the apical impulse was normal [Arterial Pulses Carotid] : carotid pulses were normal with no bruits [Cervical Lymph Nodes Enlarged Posterior Bilaterally] : posterior cervical [Cervical Lymph Nodes Enlarged Anterior Bilaterally] : anterior cervical [No CVA Tenderness] : no ~M costovertebral angle tenderness [Abnormal Walk] : normal gait [Skin Color & Pigmentation] : normal skin color and pigmentation

## 2020-03-02 ENCOUNTER — OUTPATIENT (OUTPATIENT)
Dept: OUTPATIENT SERVICES | Facility: HOSPITAL | Age: 60
LOS: 1 days | End: 2020-03-02
Payer: COMMERCIAL

## 2020-03-02 ENCOUNTER — APPOINTMENT (OUTPATIENT)
Dept: GASTROENTEROLOGY | Facility: HOSPITAL | Age: 60
End: 2020-03-02

## 2020-03-02 ENCOUNTER — RESULT REVIEW (OUTPATIENT)
Age: 60
End: 2020-03-02

## 2020-03-02 DIAGNOSIS — R58 HEMORRHAGE, NOT ELSEWHERE CLASSIFIED: Chronic | ICD-10-CM

## 2020-03-02 DIAGNOSIS — K22.70 BARRETT'S ESOPHAGUS WITHOUT DYSPLASIA: ICD-10-CM

## 2020-03-02 DIAGNOSIS — Z98.890 OTHER SPECIFIED POSTPROCEDURAL STATES: Chronic | ICD-10-CM

## 2020-03-02 PROCEDURE — 43239 EGD BIOPSY SINGLE/MULTIPLE: CPT

## 2020-03-02 PROCEDURE — 88312 SPECIAL STAINS GROUP 1: CPT | Mod: 26

## 2020-03-02 PROCEDURE — 88305 TISSUE EXAM BY PATHOLOGIST: CPT

## 2020-03-02 PROCEDURE — 43252 EGD OPTICAL ENDOMICROSCOPY: CPT | Mod: GC

## 2020-03-02 PROCEDURE — 43252 EGD OPTICAL ENDOMICROSCOPY: CPT

## 2020-03-02 PROCEDURE — 88312 SPECIAL STAINS GROUP 1: CPT

## 2020-03-02 PROCEDURE — 88305 TISSUE EXAM BY PATHOLOGIST: CPT | Mod: 26

## 2020-03-02 PROCEDURE — 43239 EGD BIOPSY SINGLE/MULTIPLE: CPT | Mod: GC,59

## 2020-06-18 ENCOUNTER — APPOINTMENT (OUTPATIENT)
Dept: THORACIC SURGERY | Facility: CLINIC | Age: 60
End: 2020-06-18

## 2020-08-07 NOTE — PATIENT PROFILE ADULT - NSPROCHRONICPAIN_GEN_A_NUR
No. JOHN screening performed.  STOP BANG Legend: 0-2 = LOW Risk; 3-4 = INTERMEDIATE Risk; 5-8 = HIGH Risk
no

## 2020-12-03 ENCOUNTER — APPOINTMENT (OUTPATIENT)
Dept: THORACIC SURGERY | Facility: CLINIC | Age: 60
End: 2020-12-03
Payer: COMMERCIAL

## 2020-12-03 VITALS
HEART RATE: 72 BPM | OXYGEN SATURATION: 98 % | DIASTOLIC BLOOD PRESSURE: 81 MMHG | HEIGHT: 64 IN | BODY MASS INDEX: 32.44 KG/M2 | WEIGHT: 190 LBS | SYSTOLIC BLOOD PRESSURE: 172 MMHG

## 2020-12-03 DIAGNOSIS — R10.9 ABDOMINAL DISTENSION (GASEOUS): ICD-10-CM

## 2020-12-03 DIAGNOSIS — R14.0 ABDOMINAL DISTENSION (GASEOUS): ICD-10-CM

## 2020-12-03 PROCEDURE — 99214 OFFICE O/P EST MOD 30 MIN: CPT

## 2020-12-03 PROCEDURE — 99072 ADDL SUPL MATRL&STAF TM PHE: CPT

## 2020-12-04 PROBLEM — R14.0 ABDOMINAL BLOATING WITH CRAMPS: Status: ACTIVE | Noted: 2020-12-04

## 2020-12-04 RX ORDER — IBUPROFEN 800 MG/1
800 TABLET, FILM COATED ORAL
Qty: 90 | Refills: 0 | Status: COMPLETED | COMMUNITY
Start: 2020-07-29

## 2020-12-04 RX ORDER — AMOXICILLIN AND CLAVULANATE POTASSIUM 875; 125 MG/1; MG/1
875-125 TABLET, COATED ORAL
Qty: 30 | Refills: 0 | Status: COMPLETED | COMMUNITY
Start: 2020-07-15

## 2020-12-04 RX ORDER — ERGOCALCIFEROL 1.25 MG/1
1.25 MG CAPSULE, LIQUID FILLED ORAL
Qty: 4 | Refills: 0 | Status: COMPLETED | COMMUNITY
Start: 2020-07-30

## 2020-12-04 RX ORDER — METHOCARBAMOL 750 MG/1
750 TABLET, FILM COATED ORAL
Qty: 6 | Refills: 0 | Status: COMPLETED | COMMUNITY
Start: 2020-08-30

## 2020-12-04 RX ORDER — AZITHROMYCIN 250 MG/1
250 TABLET, FILM COATED ORAL
Qty: 6 | Refills: 0 | Status: COMPLETED | COMMUNITY
Start: 2020-11-12

## 2020-12-04 NOTE — PHYSICAL EXAM
[Respiration, Rhythm And Depth] : normal respiratory rhythm and effort [Exaggerated Use Of Accessory Muscles For Inspiration] : no accessory muscle use [Auscultation Breath Sounds / Voice Sounds] : lungs were clear to auscultation bilaterally [Apical Impulse] : the apical impulse was normal [Heart Rate And Rhythm] : heart rate was normal and rhythm regular [Heart Sounds] : normal S1 and S2 [Examination Of The Chest] : the chest was normal in appearance [Chest Visual Inspection Thoracic Asymmetry] : no chest asymmetry [Diminished Respiratory Excursion] : normal chest expansion [Bowel Sounds] : normal bowel sounds [Abdomen Soft] : soft [Abdomen Tenderness] : non-tender [Abdomen Mass (___ Cm)] : no abdominal mass palpated [Cervical Lymph Nodes Enlarged Posterior Bilaterally] : posterior cervical [Cervical Lymph Nodes Enlarged Anterior Bilaterally] : anterior cervical [Supraclavicular Lymph Nodes Enlarged Bilaterally] : supraclavicular [No CVA Tenderness] : no ~M costovertebral angle tenderness [No Spinal Tenderness] : no spinal tenderness [Abnormal Walk] : normal gait [Nail Clubbing] : no clubbing  or cyanosis of the fingernails [Involuntary Movements] : no involuntary movements were seen [Musculoskeletal - Swelling] : no joint swelling seen [Motor Tone] : muscle strength and tone were normal [Skin Color & Pigmentation] : normal skin color and pigmentation [Skin Turgor] : normal skin turgor [] : no rash [Skin Lesions] : no skin lesions [Deep Tendon Reflexes (DTR)] : deep tendon reflexes were 2+ and symmetric [Sensation] : the sensory exam was normal to light touch and pinprick [Motor Exam] : the motor exam was normal [No Focal Deficits] : no focal deficits [Oriented To Time, Place, And Person] : oriented to person, place, and time [Impaired Insight] : insight and judgment were intact [Affect] : the affect was normal [Mood] : the mood was normal [Memory Recent] : recent memory was not impaired [Memory Remote] : remote memory was not impaired

## 2020-12-07 NOTE — DATA REVIEWED
[FreeTextEntry1] : Of note, patient was referred to Dr. Mal Mitchell to evaluate Franco's esophagus. Patient is s/p EGD bx with Warts on 03/02/2020. Path of esophagus at 36 cm, 37 cm, 35 cm, revealed cardiac mucosa with chronic inflammation, No goblet cells seen. Squamous mucosa with reactive epithelial changes. Wats revealed columnar epithelium with no specific pathological change, Goblet cells not identified. No dysplasia present. Squamous epithelium with no specific pathological change. \par \par Barium Esophagram on 10/20/2020: \par - there is no evidence of a hiatal hernia. No gastroesophageal reflux is noted.\par - post-op changes in the region of the GEJ with communicating barium filled collection in this vicinity likely r/o surgery\par - no mass, stricture, or ulceration.

## 2020-12-07 NOTE — CONSULT LETTER
[Dear  ___] : Dear  [unfilled], [Consult Letter:] : I had the pleasure of evaluating your patient, [unfilled]. [( Thank you for referring [unfilled] for consultation for _____ )] : Thank you for referring [unfilled] for consultation for [unfilled] [Please see my note below.] : Please see my note below. [Consult Closing:] : Thank you very much for allowing me to participate in the care of this patient.  If you have any questions, please do not hesitate to contact me. [Sincerely,] : Sincerely, [FreeTextEntry2] : Dr. Renato Lyle (GI/ref)\par Ivana Lentz MD (PCP)\par Jayla Garcia MD (Hem/Onc)  [FreeTextEntry3] : Malik Gallagher MD, MPH \par System Director of Thoracic Surgery \par Director of Comprehensive Lung and Foregut [place] \par Professor Cardiovascular & Thoracic Surgery \par [facility] [employer] [facility]\par \par

## 2020-12-07 NOTE — HISTORY OF PRESENT ILLNESS
[FreeTextEntry1] : Ms. DAYSI LIU, 60 year old female, former smoker, w/ hx of HTN, anxiety, sarcoidosis, chronic GERD followed by Dr. Lyle, c/o pain when eating, epigastric pain, bloating, N/V and only eating 1 meal a day. Taking Carafate, Pantoprazole and Zantac but without good relief.\par \par EGD on 8/22/19 showed Franco's esophagus and hiatal hernia at EG junction. Path of EG junction revealed columnar mucosa with no significant histopathology. No intestinal cell metaplasia. Alcian blue/PAS stain is negative. \par \par CT Chest on 10/2/19:\par - several nodules noted in the RLL, largest measuring 7mm\par - stable 4mm vague nodule in the RML along the fissure\par - several stable prominent mediastinal LNs\par - small hiatal hernia\par \par Barium Esophagram on 10/2/19 showed a moderate GE reflux and small hiatal hernia.\par \par Manometry Study on 10/4/19. LES = 20.1 (13-43), UES = 37.7 (); 100% swallows w/ normal amplitude peristalsis; 100% swallows w/ complete bolus clearance by impedence analysis; a 2.4cm hiatal hernia; poor esophageal muscle reserve. Normal motility study.\par \par Now 1 year s/p EGD, Laparoscopy Robotic-assisted, repair of hiatal hernia, Nissen Fundoplication on 10/9/19. Path showed adipose tissue and benign LNs. \par \par Of note, patient was referred to Dr. Mal Mitchell to evaluate Franco's esophagus. Patient is s/p EGD bx with Warts on 03/02/2020. Path of esophagus at 36 cm, 37 cm, 35 cm, revealed cardiac mucosa with chronic inflammation, No goblet cells seen. Squamous mucosa with reactive epithelial changes. Warts revealed columnar epithelium with no specific pathological change, Goblet cells not identified. No dysplasia present. Squamous epithelium with no specific pathological change. \par \par Barium Esophagram on 10/20/2020: \par - there is no evidence of a hiatal hernia. No gastroesophageal reflux is noted.\par - post-op changes in the region of the GEJ with communicating barium filled collection in this vicinity likely r/t surgery\par - no mass, stricture, or ulceration. \par \par Patient is here today for a follow up. She is complaining that for the past 6 months, she has been experiencing extreme bloating and upper gastric pain after eating a handful of food, and admits to maintaining her current weight. She states that she's been compliant with the post op dietary modifications. She has also been taking gas-x and Sucralfate with no resolve.

## 2020-12-07 NOTE — ASSESSMENT
[FreeTextEntry1] : Ms. DAYSI LIU, 60 year old female, former smoker, w/ hx of HTN, anxiety, sarcoidosis, chronic GERD followed by Dr. Lyle, c/o pain when eating, epigastric pain, bloating, N/V and only eating 1 meal a day. Taking Carafate, Pantoprazole and Zantac but without good relief.\par \par Now 1 year s/p EGD, Laparoscopy Robotic-assisted, repair of hiatal hernia, Nissen Fundoplication on 10/9/19. Path showed adipose tissue and benign LNs. \par \par Of note, patient was referred to Dr. Mal Mitchell to evaluate Franco's esophagus. Patient is s/p EGD bx with Warts on 03/02/2020. Path of esophagus at 36 cm, 37 cm, 35 cm, revealed cardiac mucosa with chronic inflammation, No goblet cells seen. Squamous mucosa with reactive epithelial changes. Warts revealed columnar epithelium with no specific pathological change, Goblet cells not identified. No dysplasia present. Squamous epithelium with no specific pathological change. \par \par Barium Esophagram on 10/20/2020: \par - there is no evidence of a hiatal hernia. No gastroesophageal reflux is noted.\par - post-op changes in the region of the GEJ with communicating barium filled collection in this vicinity likely r/t surgery\par - no mass, stricture, or ulceration. \par \par I have reviewed the patient's medical records and diagnostic images at time of this office consultation and have made the following recommendation:\par 1. I have recommended a gastric emptying study and  EGD for further evaluation. Risks, benefits and alternatives explained to patient; All questions answered and patient agrees to proceed with procedure\par 2. Prior to procedure; patient will need to be COVID-19 tested\par 3. I've also instructed patient to maintain a low residual diet; Take Gas-x with each meal and to take Lactaid prior to ingesting Dairy. She verbalizes understanding. \par \par I personally performed the services described in the documentation, reviewed the documentation recorded by the scribe in my presence and it accurately and completely records my words and actions.\par \par I, Ange Cruz, JOSÉ MIGUEL, am scribing for and the presence of JESICA Merlos, the following sections HISTORY OF PRESENT ILLNESS, PAST MEDICAL/FAMILY/SOCIAL HISTORY; REVIEW OF SYSTEMS; VITAL SIGNS; PHYSICAL EXAM; DISPOSITION.

## 2020-12-13 DIAGNOSIS — Z01.818 ENCOUNTER FOR OTHER PREPROCEDURAL EXAMINATION: ICD-10-CM

## 2020-12-14 ENCOUNTER — APPOINTMENT (OUTPATIENT)
Dept: NUCLEAR MEDICINE | Facility: HOSPITAL | Age: 60
End: 2020-12-14
Payer: COMMERCIAL

## 2020-12-14 ENCOUNTER — RESULT REVIEW (OUTPATIENT)
Age: 60
End: 2020-12-14

## 2020-12-14 ENCOUNTER — APPOINTMENT (OUTPATIENT)
Dept: DISASTER EMERGENCY | Facility: CLINIC | Age: 60
End: 2020-12-14

## 2020-12-14 ENCOUNTER — OUTPATIENT (OUTPATIENT)
Dept: OUTPATIENT SERVICES | Facility: HOSPITAL | Age: 60
LOS: 1 days | End: 2020-12-14

## 2020-12-14 DIAGNOSIS — Z98.890 OTHER SPECIFIED POSTPROCEDURAL STATES: Chronic | ICD-10-CM

## 2020-12-14 DIAGNOSIS — K44.9 DIAPHRAGMATIC HERNIA WITHOUT OBSTRUCTION OR GANGRENE: ICD-10-CM

## 2020-12-14 DIAGNOSIS — R58 HEMORRHAGE, NOT ELSEWHERE CLASSIFIED: Chronic | ICD-10-CM

## 2020-12-14 PROCEDURE — 78264 GASTRIC EMPTYING IMG STUDY: CPT | Mod: 26

## 2020-12-16 LAB — SARS-COV-2 N GENE NPH QL NAA+PROBE: NOT DETECTED

## 2020-12-17 ENCOUNTER — APPOINTMENT (OUTPATIENT)
Dept: THORACIC SURGERY | Facility: HOSPITAL | Age: 60
End: 2020-12-17

## 2020-12-17 ENCOUNTER — OUTPATIENT (OUTPATIENT)
Dept: OUTPATIENT SERVICES | Facility: HOSPITAL | Age: 60
LOS: 1 days | Discharge: HOME | End: 2020-12-17
Payer: COMMERCIAL

## 2020-12-17 VITALS
DIASTOLIC BLOOD PRESSURE: 62 MMHG | HEART RATE: 77 BPM | OXYGEN SATURATION: 99 % | RESPIRATION RATE: 17 BRPM | SYSTOLIC BLOOD PRESSURE: 100 MMHG

## 2020-12-17 VITALS
WEIGHT: 190.04 LBS | OXYGEN SATURATION: 100 % | HEART RATE: 90 BPM | TEMPERATURE: 97 F | HEIGHT: 64 IN | RESPIRATION RATE: 19 BRPM | DIASTOLIC BLOOD PRESSURE: 83 MMHG | SYSTOLIC BLOOD PRESSURE: 138 MMHG

## 2020-12-17 DIAGNOSIS — Z98.890 OTHER SPECIFIED POSTPROCEDURAL STATES: Chronic | ICD-10-CM

## 2020-12-17 DIAGNOSIS — R58 HEMORRHAGE, NOT ELSEWHERE CLASSIFIED: Chronic | ICD-10-CM

## 2020-12-17 DIAGNOSIS — K44.9 DIAPHRAGMATIC HERNIA WITHOUT OBSTRUCTION OR GANGRENE: ICD-10-CM

## 2020-12-17 PROCEDURE — 43235 EGD DIAGNOSTIC BRUSH WASH: CPT

## 2020-12-17 NOTE — ASU PATIENT PROFILE, ADULT - PMH
Franco's esophagus    Esophageal ulcer    GERD (gastroesophageal reflux disease)    H/O hiatal hernia    HTN (hypertension)    Miscarriage  x 2

## 2020-12-17 NOTE — ASU PATIENT PROFILE, ADULT - PSH
Bleeding  uterine ablation  H/O thyroid cyst    S/P bilateral breast reduction    S/P  Section  X2

## 2020-12-19 ENCOUNTER — APPOINTMENT (OUTPATIENT)
Dept: DISASTER EMERGENCY | Facility: CLINIC | Age: 60
End: 2020-12-19

## 2020-12-21 ENCOUNTER — OUTPATIENT (OUTPATIENT)
Dept: OUTPATIENT SERVICES | Facility: HOSPITAL | Age: 60
LOS: 1 days | End: 2020-12-21
Payer: COMMERCIAL

## 2020-12-21 VITALS
RESPIRATION RATE: 16 BRPM | TEMPERATURE: 98 F | HEART RATE: 76 BPM | SYSTOLIC BLOOD PRESSURE: 112 MMHG | OXYGEN SATURATION: 99 % | DIASTOLIC BLOOD PRESSURE: 67 MMHG | HEIGHT: 63 IN | WEIGHT: 192.9 LBS

## 2020-12-21 DIAGNOSIS — Z98.890 OTHER SPECIFIED POSTPROCEDURAL STATES: Chronic | ICD-10-CM

## 2020-12-21 DIAGNOSIS — K21.9 GASTRO-ESOPHAGEAL REFLUX DISEASE WITHOUT ESOPHAGITIS: ICD-10-CM

## 2020-12-21 DIAGNOSIS — R58 HEMORRHAGE, NOT ELSEWHERE CLASSIFIED: Chronic | ICD-10-CM

## 2020-12-21 DIAGNOSIS — I10 ESSENTIAL (PRIMARY) HYPERTENSION: ICD-10-CM

## 2020-12-21 DIAGNOSIS — K44.9 DIAPHRAGMATIC HERNIA WITHOUT OBSTRUCTION OR GANGRENE: ICD-10-CM

## 2020-12-21 LAB
ANION GAP SERPL CALC-SCNC: 9 MMOL/L — SIGNIFICANT CHANGE UP (ref 7–14)
BLD GP AB SCN SERPL QL: NEGATIVE — SIGNIFICANT CHANGE UP
BUN SERPL-MCNC: 21 MG/DL — SIGNIFICANT CHANGE UP (ref 7–23)
CALCIUM SERPL-MCNC: 9.5 MG/DL — SIGNIFICANT CHANGE UP (ref 8.4–10.5)
CHLORIDE SERPL-SCNC: 108 MMOL/L — HIGH (ref 98–107)
CO2 SERPL-SCNC: 26 MMOL/L — SIGNIFICANT CHANGE UP (ref 22–31)
CREAT SERPL-MCNC: 0.67 MG/DL — SIGNIFICANT CHANGE UP (ref 0.5–1.3)
GLUCOSE SERPL-MCNC: 115 MG/DL — HIGH (ref 70–99)
HCT VFR BLD CALC: 38.1 % — SIGNIFICANT CHANGE UP (ref 34.5–45)
HGB BLD-MCNC: 11.9 G/DL — SIGNIFICANT CHANGE UP (ref 11.5–15.5)
MCHC RBC-ENTMCNC: 27.4 PG — SIGNIFICANT CHANGE UP (ref 27–34)
MCHC RBC-ENTMCNC: 31.2 GM/DL — LOW (ref 32–36)
MCV RBC AUTO: 87.6 FL — SIGNIFICANT CHANGE UP (ref 80–100)
NRBC # BLD: 0 /100 WBCS — SIGNIFICANT CHANGE UP
NRBC # FLD: 0 K/UL — SIGNIFICANT CHANGE UP
PLATELET # BLD AUTO: 224 K/UL — SIGNIFICANT CHANGE UP (ref 150–400)
POTASSIUM SERPL-MCNC: 4.8 MMOL/L — SIGNIFICANT CHANGE UP (ref 3.5–5.3)
POTASSIUM SERPL-SCNC: 4.8 MMOL/L — SIGNIFICANT CHANGE UP (ref 3.5–5.3)
RBC # BLD: 4.35 M/UL — SIGNIFICANT CHANGE UP (ref 3.8–5.2)
RBC # FLD: 14.4 % — SIGNIFICANT CHANGE UP (ref 10.3–14.5)
RH IG SCN BLD-IMP: POSITIVE — SIGNIFICANT CHANGE UP
SODIUM SERPL-SCNC: 143 MMOL/L — SIGNIFICANT CHANGE UP (ref 135–145)
WBC # BLD: 5.49 K/UL — SIGNIFICANT CHANGE UP (ref 3.8–10.5)
WBC # FLD AUTO: 5.49 K/UL — SIGNIFICANT CHANGE UP (ref 3.8–10.5)

## 2020-12-21 PROCEDURE — 93010 ELECTROCARDIOGRAM REPORT: CPT

## 2020-12-21 RX ORDER — PANTOPRAZOLE SODIUM 20 MG/1
0 TABLET, DELAYED RELEASE ORAL
Qty: 0 | Refills: 0 | DISCHARGE

## 2020-12-21 RX ORDER — AMLODIPINE BESYLATE 2.5 MG/1
1 TABLET ORAL
Qty: 0 | Refills: 0 | DISCHARGE

## 2020-12-21 RX ORDER — SUCRALFATE 1 G
1 TABLET ORAL
Qty: 0 | Refills: 0 | DISCHARGE

## 2020-12-21 NOTE — H&P PST ADULT - NEGATIVE MUSCULOSKELETAL SYMPTOMS
no arthralgia no arthralgia/no arthritis/no joint swelling/no myalgia/no muscle cramps/no muscle weakness

## 2020-12-21 NOTE — H&P PST ADULT - NSICDXPROBLEM_GEN_ALL_CORE_FT
PROBLEM DIAGNOSES  Problem: Diaphragmatic hernia without obstruction  Assessment and Plan: Patient tentatively scheduled for upper endoscopy, re- do laparoscopy, robotic assisted recurrent hiatal hernia repair , fundoplication.    on 12/22/20  Pre-op instructions provided. Pt given verbal and written instructions with teach back on chlorhexidine shampoo . Pt verbalized understanding with return demonstration.   Covid test done as per patient     Problem: GERD (gastroesophageal reflux disease)  Assessment and Plan: Patient instructed to take pantoprazole with a sip of water on the morning of procedure.     Problem: HTN (hypertension)  Assessment and Plan: Patient instructed to take amlodipine with a sip of water on the morning of procedure.

## 2020-12-21 NOTE — H&P PST ADULT - MUSCULOSKELETAL
details… ROM intact detailed exam ROM intact/no joint swelling/no joint erythema/no joint warmth/no calf tenderness

## 2020-12-21 NOTE — H&P PST ADULT - NEGATIVE NEUROLOGICAL SYMPTOMS
no migraines/no generalized seizures/no focal seizures/no headache no migraines/no weakness/no paresthesias/no generalized seizures/no focal seizures/no difficulty walking/no headache

## 2020-12-21 NOTE — H&P PST ADULT - GIT GEN HX ROS MEA POS PC
nausea/abdominal pain at times  Pre op dx : diaphragmatic hernia with out obstruction/nausea/abdominal pain

## 2020-12-21 NOTE — H&P PST ADULT - NEGATIVE ENMT SYMPTOMS
no hearing difficulty/no ear pain/no tinnitus/no sinus symptoms/no nasal congestion/no nasal discharge/no throat pain/no dysphagia

## 2020-12-21 NOTE — ASU PATIENT PROFILE, ADULT - PSH
Bleeding  uterine ablation  H/O thyroid cyst    History of repair of hiatal hernia    S/P bilateral breast reduction    S/P  Section  X2

## 2020-12-21 NOTE — H&P PST ADULT - NSICDXPASTSURGICALHX_GEN_ALL_CORE_FT
PAST SURGICAL HISTORY:  Bleeding uterine ablation    H/O thyroid cyst     History of repair of hiatal hernia 2019    S/P bilateral breast reduction     S/P  Section X2

## 2020-12-21 NOTE — H&P PST ADULT - RS GEN PE MLT RESP DETAILS PC
clear to auscultation bilaterally/no rales/no rhonchi/no wheezes airway patent/breath sounds equal/good air movement/clear to auscultation bilaterally/no rales/no rhonchi/no wheezes

## 2020-12-21 NOTE — ASU PATIENT PROFILE, ADULT - PMH
Add 86213 Cpt? (Important Note: In 2017 The Use Of 62421 Is Being Tracked By Cms To Determine Future Global Period Reimbursement For Global Periods): no Detail Level: Detailed Franco's esophagus    Esophageal ulcer    GERD (gastroesophageal reflux disease)    H/O hiatal hernia    HTN (hypertension)    Miscarriage  x 2

## 2020-12-21 NOTE — H&P PST ADULT - HISTORY OF PRESENT ILLNESS
This is a 59 y/o female who presents with h/o robles's esophagus and progressively worsening symptomatology of hiatal hernia confirmed on endoscopy. Had hiatal hernia repair surgery last year. Patient c/o of symptoms again of bloating epigastric pain, and nausea, NO relief   with medications .  Scheduled for upper endoscopy, re- do laparoscopy, robotic assisted recurrent hiatal hernia repair , fundoplication.  This is a 61 y/o female who presents with h/o robles's esophagus, Had hiatal hernia repair surgery last year. Patient c/o of symptoms again of bloating epigastric pain, and nausea, NO relief   with medications .  Scheduled for upper endoscopy, re- do laparoscopy, robotic assisted recurrent hiatal hernia repair , fundoplication.

## 2020-12-21 NOTE — H&P PST ADULT - NEGATIVE OPHTHALMOLOGIC SYMPTOMS
no diplopia/no photophobia no diplopia/no photophobia/no lacrimation L/no lacrimation R/no blurred vision L/no blurred vision R/no discharge L/no discharge R

## 2020-12-22 ENCOUNTER — INPATIENT (INPATIENT)
Facility: HOSPITAL | Age: 60
LOS: 0 days | Discharge: HOME | End: 2020-12-23
Attending: THORACIC SURGERY (CARDIOTHORACIC VASCULAR SURGERY) | Admitting: THORACIC SURGERY (CARDIOTHORACIC VASCULAR SURGERY)
Payer: COMMERCIAL

## 2020-12-22 ENCOUNTER — APPOINTMENT (OUTPATIENT)
Dept: THORACIC SURGERY | Facility: HOSPITAL | Age: 60
End: 2020-12-22

## 2020-12-22 ENCOUNTER — RESULT REVIEW (OUTPATIENT)
Age: 60
End: 2020-12-22

## 2020-12-22 VITALS
RESPIRATION RATE: 16 BRPM | HEIGHT: 63 IN | OXYGEN SATURATION: 100 % | DIASTOLIC BLOOD PRESSURE: 67 MMHG | WEIGHT: 192.9 LBS | HEART RATE: 77 BPM | TEMPERATURE: 98 F | SYSTOLIC BLOOD PRESSURE: 123 MMHG

## 2020-12-22 DIAGNOSIS — Z98.890 OTHER SPECIFIED POSTPROCEDURAL STATES: Chronic | ICD-10-CM

## 2020-12-22 DIAGNOSIS — K44.9 DIAPHRAGMATIC HERNIA WITHOUT OBSTRUCTION OR GANGRENE: ICD-10-CM

## 2020-12-22 DIAGNOSIS — R58 HEMORRHAGE, NOT ELSEWHERE CLASSIFIED: Chronic | ICD-10-CM

## 2020-12-22 LAB — SARS-COV-2 N GENE NPH QL NAA+PROBE: NOT DETECTED

## 2020-12-22 PROCEDURE — S2900 ROBOTIC SURGICAL SYSTEM: CPT | Mod: NC

## 2020-12-22 PROCEDURE — 88302 TISSUE EXAM BY PATHOLOGIST: CPT | Mod: 26

## 2020-12-22 PROCEDURE — 71045 X-RAY EXAM CHEST 1 VIEW: CPT | Mod: 26

## 2020-12-22 PROCEDURE — 99233 SBSQ HOSP IP/OBS HIGH 50: CPT

## 2020-12-22 PROCEDURE — 43235 EGD DIAGNOSTIC BRUSH WASH: CPT

## 2020-12-22 PROCEDURE — 43281 LAP PARAESOPHAG HERN REPAIR: CPT

## 2020-12-22 RX ORDER — SODIUM CHLORIDE 9 MG/ML
1000 INJECTION, SOLUTION INTRAVENOUS
Refills: 0 | Status: DISCONTINUED | OUTPATIENT
Start: 2020-12-22 | End: 2020-12-23

## 2020-12-22 RX ORDER — NALOXONE HYDROCHLORIDE 4 MG/.1ML
0.1 SPRAY NASAL
Refills: 0 | Status: DISCONTINUED | OUTPATIENT
Start: 2020-12-22 | End: 2020-12-23

## 2020-12-22 RX ORDER — HYDROMORPHONE HYDROCHLORIDE 2 MG/ML
30 INJECTION INTRAMUSCULAR; INTRAVENOUS; SUBCUTANEOUS
Refills: 0 | Status: DISCONTINUED | OUTPATIENT
Start: 2020-12-22 | End: 2020-12-23

## 2020-12-22 RX ORDER — ONDANSETRON 8 MG/1
4 TABLET, FILM COATED ORAL EVERY 6 HOURS
Refills: 0 | Status: DISCONTINUED | OUTPATIENT
Start: 2020-12-22 | End: 2020-12-23

## 2020-12-22 RX ORDER — HEPARIN SODIUM 5000 [USP'U]/ML
5000 INJECTION INTRAVENOUS; SUBCUTANEOUS EVERY 8 HOURS
Refills: 0 | Status: DISCONTINUED | OUTPATIENT
Start: 2020-12-22 | End: 2020-12-23

## 2020-12-22 RX ORDER — HYDROMORPHONE HYDROCHLORIDE 2 MG/ML
0.5 INJECTION INTRAMUSCULAR; INTRAVENOUS; SUBCUTANEOUS
Refills: 0 | Status: DISCONTINUED | OUTPATIENT
Start: 2020-12-22 | End: 2020-12-23

## 2020-12-22 RX ORDER — SODIUM CHLORIDE 9 MG/ML
1000 INJECTION, SOLUTION INTRAVENOUS
Refills: 0 | Status: DISCONTINUED | OUTPATIENT
Start: 2020-12-22 | End: 2020-12-22

## 2020-12-22 RX ORDER — HEPARIN SODIUM 5000 [USP'U]/ML
5000 INJECTION INTRAVENOUS; SUBCUTANEOUS ONCE
Refills: 0 | Status: COMPLETED | OUTPATIENT
Start: 2020-12-22 | End: 2020-12-22

## 2020-12-22 RX ADMIN — ONDANSETRON 4 MILLIGRAM(S): 8 TABLET, FILM COATED ORAL at 12:09

## 2020-12-22 RX ADMIN — HYDROMORPHONE HYDROCHLORIDE 30 MILLILITER(S): 2 INJECTION INTRAMUSCULAR; INTRAVENOUS; SUBCUTANEOUS at 19:29

## 2020-12-22 RX ADMIN — HEPARIN SODIUM 5000 UNIT(S): 5000 INJECTION INTRAVENOUS; SUBCUTANEOUS at 22:27

## 2020-12-22 RX ADMIN — SODIUM CHLORIDE 75 MILLILITER(S): 9 INJECTION, SOLUTION INTRAVENOUS at 19:32

## 2020-12-22 RX ADMIN — HEPARIN SODIUM 5000 UNIT(S): 5000 INJECTION INTRAVENOUS; SUBCUTANEOUS at 07:46

## 2020-12-22 RX ADMIN — HEPARIN SODIUM 5000 UNIT(S): 5000 INJECTION INTRAVENOUS; SUBCUTANEOUS at 14:00

## 2020-12-22 NOTE — ASU PREOP CHECKLIST - TAMPON REMOVED
Physical Therapy      Patient Name:  Yosef Rowe   MRN:  [id number]    Patient not seen today secondary to unavailable x2 attempts. First attempt in AM, pt with MD having wound vac placed. Second attempt in PM, pt was having PICC line placed  . Will follow-up tomorrow.    Rolando Vásquez, PT     n/a

## 2020-12-22 NOTE — BRIEF OPERATIVE NOTE - OPERATION/FINDINGS
60F h/o Franco’s esophagus and laparoscopic repair of hiatal hernia w/Nissen fundoplication [10/2019] who is now s/p laparoscopic robotic assisted repair of recurrent hiatal hernia w/______ fundoplication 60F h/o Franco’s esophagus and laparoscopic repair of hiatal hernia w/Nissen fundoplication [10/2019]; anterior hiatus had opened w/the posterior wrap herniating through the hiatus she is now s/p takedown of previous fundoplication and laparoscopic robotic assisted repair of recurrent hiatal hernia w/Nissen fundoplication.

## 2020-12-23 ENCOUNTER — TRANSCRIPTION ENCOUNTER (OUTPATIENT)
Age: 60
End: 2020-12-23

## 2020-12-23 VITALS
DIASTOLIC BLOOD PRESSURE: 92 MMHG | HEART RATE: 82 BPM | SYSTOLIC BLOOD PRESSURE: 140 MMHG | RESPIRATION RATE: 16 BRPM | OXYGEN SATURATION: 99 %

## 2020-12-23 LAB
ANION GAP SERPL CALC-SCNC: 10 MMOL/L — SIGNIFICANT CHANGE UP (ref 7–14)
BASOPHILS # BLD AUTO: 0 K/UL — SIGNIFICANT CHANGE UP (ref 0–0.2)
BASOPHILS NFR BLD AUTO: 0 % — SIGNIFICANT CHANGE UP (ref 0–2)
BUN SERPL-MCNC: 11 MG/DL — SIGNIFICANT CHANGE UP (ref 7–23)
CALCIUM SERPL-MCNC: 9.1 MG/DL — SIGNIFICANT CHANGE UP (ref 8.4–10.5)
CHLORIDE SERPL-SCNC: 106 MMOL/L — SIGNIFICANT CHANGE UP (ref 98–107)
CO2 SERPL-SCNC: 25 MMOL/L — SIGNIFICANT CHANGE UP (ref 22–31)
CREAT SERPL-MCNC: 0.55 MG/DL — SIGNIFICANT CHANGE UP (ref 0.5–1.3)
EOSINOPHIL # BLD AUTO: 0 K/UL — SIGNIFICANT CHANGE UP (ref 0–0.5)
EOSINOPHIL NFR BLD AUTO: 0 % — SIGNIFICANT CHANGE UP (ref 0–6)
GLUCOSE SERPL-MCNC: 119 MG/DL — HIGH (ref 70–99)
HCT VFR BLD CALC: 35.4 % — SIGNIFICANT CHANGE UP (ref 34.5–45)
HGB BLD-MCNC: 11.3 G/DL — LOW (ref 11.5–15.5)
IANC: 5.71 K/UL — SIGNIFICANT CHANGE UP (ref 1.5–8.5)
IMM GRANULOCYTES NFR BLD AUTO: 0.6 % — SIGNIFICANT CHANGE UP (ref 0–1.5)
LYMPHOCYTES # BLD AUTO: 0.78 K/UL — LOW (ref 1–3.3)
LYMPHOCYTES # BLD AUTO: 11.3 % — LOW (ref 13–44)
MCHC RBC-ENTMCNC: 27.7 PG — SIGNIFICANT CHANGE UP (ref 27–34)
MCHC RBC-ENTMCNC: 31.9 GM/DL — LOW (ref 32–36)
MCV RBC AUTO: 86.8 FL — SIGNIFICANT CHANGE UP (ref 80–100)
MONOCYTES # BLD AUTO: 0.35 K/UL — SIGNIFICANT CHANGE UP (ref 0–0.9)
MONOCYTES NFR BLD AUTO: 5.1 % — SIGNIFICANT CHANGE UP (ref 2–14)
NEUTROPHILS # BLD AUTO: 5.71 K/UL — SIGNIFICANT CHANGE UP (ref 1.8–7.4)
NEUTROPHILS NFR BLD AUTO: 83 % — HIGH (ref 43–77)
NRBC # BLD: 0 /100 WBCS — SIGNIFICANT CHANGE UP
NRBC # FLD: 0 K/UL — SIGNIFICANT CHANGE UP
PLATELET # BLD AUTO: 185 K/UL — SIGNIFICANT CHANGE UP (ref 150–400)
POTASSIUM SERPL-MCNC: 4.3 MMOL/L — SIGNIFICANT CHANGE UP (ref 3.5–5.3)
POTASSIUM SERPL-SCNC: 4.3 MMOL/L — SIGNIFICANT CHANGE UP (ref 3.5–5.3)
RBC # BLD: 4.08 M/UL — SIGNIFICANT CHANGE UP (ref 3.8–5.2)
RBC # FLD: 14 % — SIGNIFICANT CHANGE UP (ref 10.3–14.5)
SODIUM SERPL-SCNC: 141 MMOL/L — SIGNIFICANT CHANGE UP (ref 135–145)
WBC # BLD: 6.88 K/UL — SIGNIFICANT CHANGE UP (ref 3.8–10.5)
WBC # FLD AUTO: 6.88 K/UL — SIGNIFICANT CHANGE UP (ref 3.8–10.5)

## 2020-12-23 PROCEDURE — 99233 SBSQ HOSP IP/OBS HIGH 50: CPT

## 2020-12-23 PROCEDURE — 71045 X-RAY EXAM CHEST 1 VIEW: CPT | Mod: 26

## 2020-12-23 PROCEDURE — 74220 X-RAY XM ESOPHAGUS 1CNTRST: CPT | Mod: 26

## 2020-12-23 RX ORDER — SIMETHICONE 80 MG/1
1 TABLET, CHEWABLE ORAL
Qty: 10 | Refills: 0
Start: 2020-12-23 | End: 2020-12-27

## 2020-12-23 RX ORDER — LOPERAMIDE HCL 2 MG
2 TABLET ORAL ONCE
Refills: 0 | Status: COMPLETED | OUTPATIENT
Start: 2020-12-23 | End: 2020-12-23

## 2020-12-23 RX ORDER — DIPHENHYDRAMINE HCL 50 MG
25 CAPSULE ORAL EVERY 6 HOURS
Refills: 0 | Status: DISCONTINUED | OUTPATIENT
Start: 2020-12-23 | End: 2020-12-23

## 2020-12-23 RX ORDER — METOCLOPRAMIDE HCL 10 MG
10 TABLET ORAL ONCE
Refills: 0 | Status: COMPLETED | OUTPATIENT
Start: 2020-12-23 | End: 2020-12-23

## 2020-12-23 RX ORDER — ACETAMINOPHEN 500 MG
1000 TABLET ORAL EVERY 6 HOURS
Refills: 0 | Status: DISCONTINUED | OUTPATIENT
Start: 2020-12-23 | End: 2020-12-23

## 2020-12-23 RX ORDER — SODIUM CHLORIDE 9 MG/ML
1000 INJECTION, SOLUTION INTRAVENOUS
Refills: 0 | Status: DISCONTINUED | OUTPATIENT
Start: 2020-12-23 | End: 2020-12-23

## 2020-12-23 RX ORDER — PANTOPRAZOLE SODIUM 20 MG/1
1 TABLET, DELAYED RELEASE ORAL
Qty: 0 | Refills: 0 | DISCHARGE

## 2020-12-23 RX ORDER — ACETAMINOPHEN 500 MG
2 TABLET ORAL
Qty: 24 | Refills: 0
Start: 2020-12-23 | End: 2020-12-26

## 2020-12-23 RX ORDER — OXYCODONE HYDROCHLORIDE 5 MG/1
1 TABLET ORAL
Qty: 24 | Refills: 0
Start: 2020-12-23 | End: 2020-12-28

## 2020-12-23 RX ADMIN — ONDANSETRON 4 MILLIGRAM(S): 8 TABLET, FILM COATED ORAL at 00:26

## 2020-12-23 RX ADMIN — ONDANSETRON 4 MILLIGRAM(S): 8 TABLET, FILM COATED ORAL at 06:10

## 2020-12-23 RX ADMIN — SODIUM CHLORIDE 75 MILLILITER(S): 9 INJECTION, SOLUTION INTRAVENOUS at 07:38

## 2020-12-23 RX ADMIN — HYDROMORPHONE HYDROCHLORIDE 30 MILLILITER(S): 2 INJECTION INTRAMUSCULAR; INTRAVENOUS; SUBCUTANEOUS at 07:38

## 2020-12-23 RX ADMIN — Medication 25 MILLIGRAM(S): at 07:52

## 2020-12-23 RX ADMIN — HEPARIN SODIUM 5000 UNIT(S): 5000 INJECTION INTRAVENOUS; SUBCUTANEOUS at 05:05

## 2020-12-23 RX ADMIN — Medication 2 MILLIGRAM(S): at 14:07

## 2020-12-23 RX ADMIN — Medication 10 MILLIGRAM(S): at 07:52

## 2020-12-23 NOTE — DISCHARGE NOTE PROVIDER - NSDCFUADDAPPT_GEN_ALL_CORE_FT
See Dr. Gallagher's office in 7-10 days. Please call [phone] for an apt. Please get an CXR the day before your appointment and bring it with you to your follow up appointment.  Please call the office at [phone] if you have fever's chills, worsening shortness of breath, chest pain, warmth, redness or purulent discharge from the insertion site, abdominal bloating, vomiting.

## 2020-12-23 NOTE — DISCHARGE NOTE NURSING/CASE MANAGEMENT/SOCIAL WORK - PATIENT PORTAL LINK FT
You can access the FollowMyHealth Patient Portal offered by [facility] by registering at the following website: [email or web address]. By joining [employer]’s FollowMyHealth portal, you will also be able to view your health information using other applications (apps) compatible with our system.

## 2020-12-23 NOTE — DISCHARGE NOTE PROVIDER - HOSPITAL COURSE
59 y/o female who presents with h/o robles's esophagus, Had hiatal hernia repair surgery last year. Patient c/o of symptoms again of bloating epigastric pain, and nausea, NO relief with medications. Patient underwent Upper endoscopy, laparoscopic robotic-assisted redo hiatal hernia repair w/Nissen fundoplication on 12/22/2020. Patient had barium swallow on 12/23/2020, which showed no delay in contrast, reviewed by Dr. Gallagher. Patient tolerated Clear Liquid diet. She is ready for discharge home today.

## 2020-12-23 NOTE — DISCHARGE NOTE PROVIDER - NSDCCPTREATMENT_GEN_ALL_CORE_FT
PRINCIPAL PROCEDURE  Procedure: Robot-assisted Nissen fundoplication  Findings and Treatment:

## 2020-12-23 NOTE — DISCHARGE NOTE PROVIDER - CARE PROVIDER_API CALL
Malik Gallagher  SURGERY  [address] [place] [address] [place] [address]  Phone: [phone]  Fax: [phone]  Follow Up Time:

## 2020-12-23 NOTE — DISCHARGE NOTE PROVIDER - NSDCMRMEDTOKEN_GEN_ALL_CORE_FT
acetaminophen 325 mg oral tablet, disintegratin tab(s) orally every 8 hours, As Needed -for moderate pain   amLODIPine 5 mg oral tablet: 1 tab(s) orally 2 times a day  CXR: CXR  dx: s/p Hiatal hernia repair, nissen fundoplication  Please give copy of imaging to patient and fax results to Dr. Gallagher&#x27;s office at [phone]  Gas-X 80 mg oral tablet, chewable: 1 tab(s) chewed 2 times a day, As Needed for gas  Lactaid:   oxyCODONE 5 mg oral tablet: 1 tab(s) orally every 6 hours, As Needed -for moderate pain MDD:4  valsartan 320 mg oral tablet: 1 tab(s) orally once a day

## 2020-12-23 NOTE — PROGRESS NOTE ADULT - SUBJECTIVE AND OBJECTIVE BOX
DAYSI LIU                     [id number]    HPI:   This is a 59 y/o female who presents with h/o robles's esophagus, Had hiatal hernia repair surgery last year. Patient c/o of symptoms again of bloating epigastric pain, and nausea, NO relief   with medications .  Scheduled for upper endoscopy, re- do laparoscopy, robotic assisted recurrent hiatal hernia repair , fundoplication.     Procedure: Robot-assisted Nissen fundoplication 22-Dec-2020       Issues:  Hiatal hernia  HTN  GERD  Post op pain       PAST MEDICAL & SURGICAL HISTORY:  Robles&#x27;s esophagus    Miscarriage  x 2    H/O hiatal hernia    HTN (hypertension)    Esophageal ulcer    GERD (gastroesophageal reflux disease)    History of repair of hiatal hernia  2019    Bleeding  uterine ablation    S/P bilateral breast reduction    H/O thyroid cyst    S/P  Section  X2              VITAL SIGNS:  Vital Signs Last 24 Hrs  T(C): 36.5 (22 Dec 2020 12:00), Max: 36.5 (22 Dec 2020 12:00)  T(F): 97.7 (22 Dec 2020 12:00), Max: 97.7 (22 Dec 2020 12:00)  HR: 74 (22 Dec 2020 15:00) (71 - 84)  BP: 117/60 (22 Dec 2020 15:00) (109/55 - 123/67)  BP(mean): 74 (22 Dec 2020 15:00) (68 - 87)  RR: 13 (22 Dec 2020 15:00) (12 - 17)  SpO2: 96% (22 Dec 2020 15:00) (94% - 100%)    I/Os:   I&O's Detail    21 Dec 2020 07:01  -  22 Dec 2020 07:00  --------------------------------------------------------  IN:    Lactated Ringers: 30 mL  Total IN: 30 mL    OUT:  Total OUT: 0 mL    Total NET: 30 mL          CAPILLARY BLOOD GLUCOSE          =======================MEDICATIONS===================  MEDICATIONS  (STANDING):  heparin   Injectable 5000 Unit(s) SubCutaneous every 8 hours  HYDROmorphone PCA (1 mG/mL) 30 milliLiter(s) PCA Continuous PCA Continuous  lactated ringers. 1000 milliLiter(s) (75 mL/Hr) IV Continuous <Continuous>    MEDICATIONS  (PRN):  HYDROmorphone PCA (1 mG/mL) Rescue Clinician Bolus 0.5 milliGRAM(s) IV Push every 15 minutes PRN for Pain Scale GREATER THAN 6  naloxone Injectable 0.1 milliGRAM(s) IV Push every 3 minutes PRN For ANY of the following changes in patient status:  A. RR LESS THAN 10 breaths per minute, B. Oxygen saturation LESS THAN 90%, C. Sedation score of 6  ondansetron Injectable 4 milliGRAM(s) IV Push every 6 hours PRN Nausea      PHYSICAL EXAM============================  General:                         Awake, alert, not in any distress  Neuro:                            Moving all extremities to commands.   Respiratory:	Air entry fair and  bilateral conducted sounds                                           Effort even and unlabored.  CV:		Regular rate and rhythm. Normal S1/S2                                          Distal pulses present.  Abdomen:	                     Soft, non-distended. Bowel sounds present   Skin:		No rash.  Extremities:	Warm, no cyanosis or edema.  Palpable pulses    ============================LABS=========================                        11.9   5.49  )-----------( 224      ( 21 Dec 2020 09:43 )             38.1     12-    143  |  108<H>  |  21  ----------------------------<  115<H>  4.8   |  26  |  0.67    Ca    9.5      21 Dec 2020 09:43        =============================NEUROLOGY============================  Pain control with PCA  / Tylenol IV / Toradol   Anxiety: Xanax or ativan PRN  ==============================RESPIRATORY========================  Pt is on 2 L nasal canula   Comfortable, not in any distress.  Using incentive spirometry   	  Continue bronchodilators, pulmonary toilet    ============================CARDIOVASCULAR======================  Continue hemodynamic monitoring.  Not on any pressors    =====================RENAL===================  Continue LR     Monitor I/Os and electrolytes    ====================GASTROINTESTINAL===================  NPO, Barium study in am   Continue GI prophylaxis with  Protonix  Continue Zofran / Reglan for nausea - PRN	    ========================HEMATOLOGIC/ONCOLOGIC====================  . No signs of active bleeding.   Follow CBC in AM    ============================INFECTIOUS DISEASE========================  Monitor for fever / leukocytosis.  All surgical incision   sites look clean      Pt is on GI & DVT prophylaxis  OOB & ambulate       Pertinent clinical, laboratory, radiographic, hemodynamic, echocardiographic, respiratory data, microbiologic data and chart were reviewed and analyzed frequently throughout the course of the day and night  Patient seen, examined and plan discussed with CT Surgery / CTICU team during rounds.    Pt's status discussed with family at bedside, updated status        Dayday Elizabeth DO FACEP    
DAYSI LIU            [id number]     Female    No Known Allergies                 This is a 61 y/o female who presents with h/o robles's esophagus, Had hiatal hernia repair surgery last year. Patient c/o of symptoms again of bloating epigastric pain, and nausea, NO relief   with medications .  Scheduled for upper endoscopy, re- do laparoscopy, robotic assisted recurrent hiatal hernia repair , fundoplication.       Procedure: Robot-assisted Nissen fundoplication 20    Issues:              Hiatal Hernia              Postop pain  Dysphagia  HTN  GERD                 PAST MEDICAL & SURGICAL HISTORY:  Robles&#x27;s esophagus    Miscarriage  x 2    H/O hiatal hernia    HTN (hypertension)    Esophageal ulcer    GERD (gastroesophageal reflux disease)    History of repair of hiatal hernia  2019    Bleeding  uterine ablation    S/P bilateral breast reduction    H/O thyroid cyst    S/P  Section  X2          ICU Vital Signs Last 24 Hrs  T(C): 36.8 (23 Dec 2020 04:00), Max: 36.8 (23 Dec 2020 00:00)  T(F): 98.3 (23 Dec 2020 04:00), Max: 98.3 (23 Dec 2020 04:00)  HR: 69 (23 Dec 2020 09:00) (68 - 89)  BP: 114/60 (23 Dec 2020 09:00) (109/55 - 138/67)  BP(mean): 72 (23 Dec 2020 09:00) (68 - 89)  ABP: --  ABP(mean): --  RR: 16 (23 Dec 2020 09:00) (12 - 21)  SpO2: 95% (23 Dec 2020 09:00) (94% - 100%)    I&O's Detail    22 Dec 2020 07:01  -  23 Dec 2020 07:00  --------------------------------------------------------  IN:    Lactated Ringers: 1425 mL  Total IN: 1425 mL    OUT:    Indwelling Catheter - Urethral (mL): 2195 mL  Total OUT: 2195 mL    Total NET: -770 mL      23 Dec 2020 07:01  -  23 Dec 2020 09:36  --------------------------------------------------------  IN:    Lactated Ringers: 150 mL  Total IN: 150 mL    OUT:  Total OUT: 0 mL    Total NET: 150 mL        CAPILLARY BLOOD GLUCOSE      Home Medications:  amLODIPine 5 mg oral tablet: 1 tab(s) orally 2 times a day (22 Dec 2020 06:29)  Lactaid:  (22 Dec 2020 06:29)  pantoprazole 40 mg oral delayed release tablet: 1 tab(s) orally once a day (22 Dec 2020 06:29)  valsartan 320 mg oral tablet: 1 tab(s) orally once a day (22 Dec 2020 06:29)      MEDICATIONS  (STANDING):  heparin   Injectable 5000 Unit(s) SubCutaneous every 8 hours  HYDROmorphone PCA (1 mG/mL) 30 milliLiter(s) PCA Continuous PCA Continuous  lactated ringers. 1000 milliLiter(s) (75 mL/Hr) IV Continuous <Continuous>    MEDICATIONS  (PRN):  diphenhydrAMINE   Injectable 25 milliGRAM(s) IV Push every 6 hours PRN Rash and/or Itching  HYDROmorphone PCA (1 mG/mL) Rescue Clinician Bolus 0.5 milliGRAM(s) IV Push every 15 minutes PRN for Pain Scale GREATER THAN 6  naloxone Injectable 0.1 milliGRAM(s) IV Push every 3 minutes PRN For ANY of the following changes in patient status:  A. RR LESS THAN 10 breaths per minute, B. Oxygen saturation LESS THAN 90%, C. Sedation score of 6  ondansetron Injectable 4 milliGRAM(s) IV Push every 6 hours PRN Nausea      Physical exam:                             General:               Pt is awake, alert, not in any distress                                                  Neuro:                  Nonfocal                             Cardiovascular:   S1 & S2, regular                           Respiratory:         Air entry is fair and equal on both sides, has bilateral conducted sounds                           GI:                          Soft, nondistended and nontender, Bowel sounds hypoactive                            Ext:                        No cyanosis or edema     Labs:                                                                           11.3   6.88  )-----------( 185      ( 23 Dec 2020 03:41 )             35.4                 141  |  106  |  11  ----------------------------<  119<H>  4.3   |  25  |  0.55    Ca    9.1      23 Dec 2020 03:41      CXR:  < from: Xray Chest 1 View- PORTABLE-Urgent (20 @ 13:35) >  small pneumoperitoneum, consistent with robotic surgery today        Plan:    General: 60yFemale s/p Robot-assisted Nissen fundoplication 20,   progressing well, experiencing  pain with deep breathing.                             Neuro:                                         Pain control with PCA / IV Tylenol. May start oral pain meds after swallow study                            Cardiovascular:                                          HTN: Continue hemodynamic monitoring and restart antihypertensives                                        Continue IVF LR 75cc/hr.                             Respiratory:                                         Pt is on 2L  nasal canula, wean off                                           Appears to be in pain but not in distress.                                         Using incentive spirometry                                                                Continue bronchodilators, pulmonary toilet                            GI                                         NPO                                         GERD: Continue  Protonix                                         Continue Zofran / Reglan for nausea - PRN                                         Barium swallow today 	                                                                 Renal:                                         Continue LR 75cc/hr                                         Monitor I/Os and electrolytes                                         d/c [facility]                                                  Hem/ Onc:                                                                                  Monitor for signs of bleeding.                                          Follow CBC in AM                           Infectious disease:                                            No signs of infection. Monitor for fever / leukocytosis.                                          All surgical incision / chest tube  sites look clean.                             Endocrine                                            Continue Accu-Checks with coverage.     Pt is on SQ Heparin and Venodyne boots for DVT prophylaxis.     Pertinent clinical, laboratory, radiographic, hemodynamic, echocardiographic, respiratory data, microbiologic data and chart were reviewed and analyzed frequently throughout the course of the day and night  Patient seen, examined and plan discussed with CT Surgeon Dr. Gallagher / CTICU team during rounds.        Joey Bales MD                                                                    
ANESTHESIA POSTOP CHECK    60y Female POSTOP DAY 1 S/P HHR, fundoplication    Vital Signs Last 24 Hrs  T(C): 36.8 (23 Dec 2020 08:00), Max: 36.8 (23 Dec 2020 00:00)  T(F): 98.2 (23 Dec 2020 08:00), Max: 98.3 (23 Dec 2020 04:00)  HR: 68 (23 Dec 2020 10:00) (68 - 89)  BP: 99/65 (23 Dec 2020 10:00) (99/65 - 138/67)  BP(mean): 73 (23 Dec 2020 10:00) (68 - 89)  RR: 8 (23 Dec 2020 10:00) (8 - 21)  SpO2: 94% (23 Dec 2020 10:00) (94% - 100%)  I&O's Summary    22 Dec 2020 07:01  -  23 Dec 2020 07:00  --------------------------------------------------------  IN: 1425 mL / OUT: 2195 mL / NET: -770 mL    23 Dec 2020 07:01  -  23 Dec 2020 10:25  --------------------------------------------------------  IN: 225 mL / OUT: 0 mL / NET: 225 mL        [X ] NO APPARENT ANESTHESIA COMPLICATIONS      Comments: 
Anesthesia Pain Management Service    SUBJECTIVE: Patient states her pain is managed well with IV PCA.   Pain Scale Score	At rest: __6/10_ 	With Activity: ___ 	[X ] Refer to charted pain scores    THERAPY:    [ ] IV PCA Morphine		[ ] 5 mg/mL	[ ] 1 mg/mL  [X ] IV PCA Hydromorphone	[ ] 5 mg/mL	[X ] 1 mg/mL  [ ] IV PCA Fentanyl		[ ] 50 micrograms/mL    Demand dose __0.2_ lockout __6_ (minutes) Continuous Rate _0__ Total: _7.8__  mg used (in past 24 hours)      MEDICATIONS  (STANDING):  acetaminophen  IVPB .. 1000 milliGRAM(s) IV Intermittent every 6 hours  heparin   Injectable 5000 Unit(s) SubCutaneous every 8 hours  HYDROmorphone PCA (1 mG/mL) 30 milliLiter(s) PCA Continuous PCA Continuous  lactated ringers. 1000 milliLiter(s) (75 mL/Hr) IV Continuous <Continuous>    MEDICATIONS  (PRN):  diphenhydrAMINE   Injectable 25 milliGRAM(s) IV Push every 6 hours PRN Rash and/or Itching  HYDROmorphone PCA (1 mG/mL) Rescue Clinician Bolus 0.5 milliGRAM(s) IV Push every 15 minutes PRN for Pain Scale GREATER THAN 6  naloxone Injectable 0.1 milliGRAM(s) IV Push every 3 minutes PRN For ANY of the following changes in patient status:  A. RR LESS THAN 10 breaths per minute, B. Oxygen saturation LESS THAN 90%, C. Sedation score of 6  ondansetron Injectable 4 milliGRAM(s) IV Push every 6 hours PRN Nausea      OBJECTIVE: Patient sitting up in chair.    Sedation Score:	[ X] Alert	[ ] Drowsy 	[ ] Arousable	[ ] Asleep	[ ] Unresponsive    Side Effects:	[X ] None	[ ] Nausea	[ ] Vomiting	[ ] Pruritus  		[ ] Other:    Vital Signs Last 24 Hrs  T(C): 36.8 (23 Dec 2020 08:00), Max: 36.8 (23 Dec 2020 00:00)  T(F): 98.2 (23 Dec 2020 08:00), Max: 98.3 (23 Dec 2020 04:00)  HR: 68 (23 Dec 2020 10:00) (68 - 89)  BP: 99/65 (23 Dec 2020 10:00) (99/65 - 138/67)  BP(mean): 73 (23 Dec 2020 10:00) (68 - 89)  RR: 8 (23 Dec 2020 10:00) (8 - 21)  SpO2: 94% (23 Dec 2020 10:00) (94% - 100%)    ASSESSMENT/ PLAN    Therapy to  be:	[ X] Continue   [ ] Discontinued   [ ] Change to prn Analgesics    Documentation and Verification of current medications:   [X] Done	[ ] Not done, not elligible    Comments: Discussed patient with primary team. Continue IV PCA. Recommend non-opioid adjuvant analgesics to be used when possible and when allowed by primary surgical team.    Progress Note written now but Patient was seen earlier.

## 2020-12-23 NOTE — DISCHARGE NOTE NURSING/CASE MANAGEMENT/SOCIAL WORK - NSDCFUADDAPPT_GEN_ALL_CORE_FT
See Dr. Gallagher's office in 7-10 days. Please call [phone] for an apt. Please get an CXR the day before your appointment and bring it with you to your follow up appointment.  Please call the office at [phone] if you have fever's chills, worsening shortness of breath, chest pain, warmth, redness or purulent discharge from the insertion site, abdominal bloating, vomiting.

## 2020-12-23 NOTE — DISCHARGE NOTE PROVIDER - INSTRUCTIONS
Clear liquid diet for 3 days, followed by Full liquid diet for 3 days, then a soft easy to chew diet until you are seen in Dr. Gallagher's office for your postop visit.

## 2020-12-23 NOTE — DISCHARGE NOTE PROVIDER - NSDCFUADDINST_GEN_ALL_CORE_FT
Please walk 4-5 x per day, Increase as tolerated. You may climb stairs. Continue to use incentive spirometer.   You may keep all wounds uncovered. Please shower daily. The suture will be removed in office at follow up apt.

## 2020-12-28 LAB — SURGICAL PATHOLOGY STUDY: SIGNIFICANT CHANGE UP

## 2021-01-01 ENCOUNTER — TRANSCRIPTION ENCOUNTER (OUTPATIENT)
Age: 61
End: 2021-01-01

## 2021-01-14 ENCOUNTER — APPOINTMENT (OUTPATIENT)
Dept: THORACIC SURGERY | Facility: CLINIC | Age: 61
End: 2021-01-14
Payer: COMMERCIAL

## 2021-01-14 VITALS
RESPIRATION RATE: 16 BRPM | SYSTOLIC BLOOD PRESSURE: 123 MMHG | OXYGEN SATURATION: 99 % | WEIGHT: 185 LBS | HEIGHT: 64 IN | BODY MASS INDEX: 31.58 KG/M2 | HEART RATE: 67 BPM | TEMPERATURE: 97.9 F | DIASTOLIC BLOOD PRESSURE: 78 MMHG

## 2021-01-14 PROCEDURE — 99024 POSTOP FOLLOW-UP VISIT: CPT

## 2021-01-14 RX ORDER — SUCRALFATE 1 G/1
1 TABLET ORAL
Qty: 180 | Refills: 0 | Status: DISCONTINUED | COMMUNITY
Start: 2020-08-27 | End: 2021-01-14

## 2021-02-15 NOTE — PATIENT PROFILE ADULT - HARM RISK FACTORS
Patient called back Patient needs a new referral to a new orthopedic for his shoulders. He says that he used to go to [facility] but they are refusing to schedule him because of an outstanding balance.    no

## 2021-03-18 ENCOUNTER — APPOINTMENT (OUTPATIENT)
Dept: THORACIC SURGERY | Facility: CLINIC | Age: 61
End: 2021-03-18
Payer: COMMERCIAL

## 2021-03-18 VITALS
DIASTOLIC BLOOD PRESSURE: 81 MMHG | TEMPERATURE: 97.6 F | SYSTOLIC BLOOD PRESSURE: 126 MMHG | HEART RATE: 65 BPM | RESPIRATION RATE: 16 BRPM | HEIGHT: 64 IN | WEIGHT: 190 LBS | BODY MASS INDEX: 32.44 KG/M2 | OXYGEN SATURATION: 99 %

## 2021-03-18 DIAGNOSIS — R10.13 EPIGASTRIC PAIN: ICD-10-CM

## 2021-03-18 PROCEDURE — 99024 POSTOP FOLLOW-UP VISIT: CPT

## 2021-05-01 ENCOUNTER — NON-APPOINTMENT (OUTPATIENT)
Age: 61
End: 2021-05-01

## 2021-06-01 ENCOUNTER — APPOINTMENT (OUTPATIENT)
Dept: VASCULAR SURGERY | Facility: CLINIC | Age: 61
End: 2021-06-01
Payer: COMMERCIAL

## 2021-06-01 PROCEDURE — 99203 OFFICE O/P NEW LOW 30 MIN: CPT

## 2021-06-01 PROCEDURE — 93970 EXTREMITY STUDY: CPT

## 2021-06-01 NOTE — HISTORY OF PRESENT ILLNESS
[FreeTextEntry1] : 61 year old female presents for evaluation of bilateral lower extremity varicose veins. Symptoms have worsened over the last year. She complains of dull aching with standing or sitting, heaviness in her legs and itchiness of the veins around her ankle. She also has ankle swelling that worsens through the day. Symptoms are worse in her left leg.\par She regularly wears compression stockings without improvement in symptoms.\par No history of DVT.\par She denies claudication, rest pain or leg ulcers\par \par \par

## 2021-06-01 NOTE — ASSESSMENT
[FreeTextEntry1] : 61 F with bilateral lower extremity symptomatic varicose veins\par Venous duplex reveals pathologic reflux in bilateral GSV > 2 seconds.\par She has failed conservative management strategies including compression stockings and exercise.\par I have discussed the option of GSV RF ablation. We have discussed the risks and benefits of the planned procedure and she will like to proceed.\par Will schedule for bilateral staged GSV ablation, left side first\par

## 2021-06-01 NOTE — PHYSICAL EXAM
[JVD] : no jugular venous distention  [Normal Breath Sounds] : Normal breath sounds [Normal Heart Sounds] : normal heart sounds [2+] : left 2+ [Ankle Swelling (On Exam)] : present [Varicose Veins Of Lower Extremities] : bilaterally [Ankle Swelling Bilaterally] : severe [] : bilaterally [Ankle Swelling On The Right] : mild [Abdomen Masses] : No abdominal masses [Abdomen Tenderness] : ~T ~M No abdominal tenderness [No Rash or Lesion] : No rash or lesion [Alert] : alert [Oriented to Person] : oriented to person [Oriented to Place] : oriented to place [Oriented to Time] : oriented to time [Calm] : calm [de-identified] : Well appearing [de-identified] : NCAT

## 2021-06-18 ENCOUNTER — APPOINTMENT (OUTPATIENT)
Dept: VASCULAR SURGERY | Facility: CLINIC | Age: 61
End: 2021-06-18

## 2021-07-28 ENCOUNTER — NON-APPOINTMENT (OUTPATIENT)
Age: 61
End: 2021-07-28

## 2021-07-28 ENCOUNTER — APPOINTMENT (OUTPATIENT)
Dept: FAMILY MEDICINE | Facility: CLINIC | Age: 61
End: 2021-07-28
Payer: COMMERCIAL

## 2021-07-28 VITALS
BODY MASS INDEX: 34.02 KG/M2 | HEART RATE: 74 BPM | HEIGHT: 63 IN | TEMPERATURE: 97.8 F | RESPIRATION RATE: 16 BRPM | SYSTOLIC BLOOD PRESSURE: 130 MMHG | DIASTOLIC BLOOD PRESSURE: 80 MMHG | WEIGHT: 192 LBS | OXYGEN SATURATION: 98 %

## 2021-07-28 DIAGNOSIS — Z11.4 ENCOUNTER FOR SCREENING FOR HUMAN IMMUNODEFICIENCY VIRUS [HIV]: ICD-10-CM

## 2021-07-28 DIAGNOSIS — Z13.31 ENCOUNTER FOR SCREENING FOR DEPRESSION: ICD-10-CM

## 2021-07-28 DIAGNOSIS — Z11.59 ENCOUNTER FOR SCREENING FOR OTHER VIRAL DISEASES: ICD-10-CM

## 2021-07-28 DIAGNOSIS — Z13.29 ENCOUNTER FOR SCREENING FOR OTHER SUSPECTED ENDOCRINE DISORDER: ICD-10-CM

## 2021-07-28 DIAGNOSIS — Z13.39 ENCOUNTER FOR SCREENING EXAM FOR OTHER MENTAL HEALTH AND BEHAVIORAL DISORDERS: ICD-10-CM

## 2021-07-28 DIAGNOSIS — Z13.220 ENCOUNTER FOR SCREENING FOR LIPOID DISORDERS: ICD-10-CM

## 2021-07-28 PROCEDURE — G0442 ANNUAL ALCOHOL SCREEN 15 MIN: CPT | Mod: 59

## 2021-07-28 PROCEDURE — 99386 PREV VISIT NEW AGE 40-64: CPT | Mod: 25

## 2021-07-28 PROCEDURE — 93000 ELECTROCARDIOGRAM COMPLETE: CPT | Mod: 59

## 2021-07-28 PROCEDURE — 99203 OFFICE O/P NEW LOW 30 MIN: CPT | Mod: 25

## 2021-07-28 PROCEDURE — G0444 DEPRESSION SCREEN ANNUAL: CPT | Mod: 59

## 2021-07-28 NOTE — HEALTH RISK ASSESSMENT
[Good] : ~his/her~  mood as  good [Yes] : Yes [2 - 4 times a month (2 pts)] : 2-4 times a month (2 points) [1 or 2 (0 pts)] : 1 or 2 (0 points) [Never (0 pts)] : Never (0 points) [No] : In the past 12 months have you used drugs other than those required for medical reasons? No [0] : 2) Feeling down, depressed, or hopeless: Not at all (0) [PHQ-2 Negative - No further assessment needed] : PHQ-2 Negative - No further assessment needed [Patient reported mammogram was normal] : Patient reported mammogram was normal [Patient reported PAP Smear was normal] : Patient reported PAP Smear was normal [Patient reported bone density results were normal] : Patient reported bone density results were normal [Patient reported colonoscopy was normal] : Patient reported colonoscopy was normal [HIV Test offered] : HIV Test offered [Hepatitis C test offered] : Hepatitis C test offered [With Family] : lives with family [] :  [Employed] : employed [Feels Safe at Home] : Feels safe at home [Smoke Detector] : smoke detector [Carbon Monoxide Detector] : carbon monoxide detector [Seat Belt] :  uses seat belt [With Patient/Caregiver] : , with patient/caregiver [Designated Healthcare Proxy] : Designated healthcare proxy [Name: ___] : Health Care Proxy's Name: [unfilled]  [Relationship: ___] : Relationship: [unfilled] [] : No [Audit-CScore] : 2 [[id number]] : 0 [Reports changes in hearing] : Reports no changes in hearing [Reports changes in vision] : Reports no changes in vision [Reports changes in dental health] : Reports no changes in dental health [TB Exposure] : is not being exposed to tuberculosis [MammogramDate] : 1/2019 [PapSmearDate] : 1/2019 [BoneDensityDate] : 1/2020 [ColonoscopyDate] : 10/2020 [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

## 2021-07-28 NOTE — HISTORY OF PRESENT ILLNESS
[FreeTextEntry1] : annual/ establish care [de-identified] : Ms. DAYSI LIU is a 61 year old female presenting to establish care.\par Pain mid back for 3-4 years constant for 1 year.\par Standing and laying on back makes it better. Sitting makes it worse.\par No PT. Had x ray done HNP, Spondylosis. Done at [facility] a year ago 8/20.\par States that she also has numbness in the hands.\par tried gabapentin previously helped a little but can only take it at night.\par \par Gastric Ulcer/ Hiatal hernia had repair Dr Gallagher.\par Dr Edwards did her endoscopy. Dr Li is her GI doctor.\par She is currently on Pantoprazole.\par HTN on Valsartan and Amlodipine, states BP fluctuates.\par \par

## 2021-07-28 NOTE — COUNSELING
[Potential consequences of obesity discussed] : Potential consequences of obesity discussed [Benefits of weight loss discussed] : Benefits of weight loss discussed [Encouraged to maintain food diary] : Encouraged to maintain food diary [Encouraged to increase physical activity] : Encouraged to increase physical activity [Encouraged to use exercise tracking device] : Encouraged to use exercise tracking device [Weigh Self Weekly] : weigh self weekly [Decrease Portions] : decrease portions [____ min/wk Activity] : [unfilled] min/wk activity [Keep Food Diary] : keep food diary [Needs reinforcement, provided] : Patient needs reinforcement on understanding of disease, goals and obesity follow-up plan; reinforcement was provided

## 2021-07-28 NOTE — ASSESSMENT
[FreeTextEntry1] : Annual\par SBIRT negative\par Depression screen negative\par Check comprehensive labs, in office today\par Vaccines up to date except Shingrix. Will check if covered\par  \par EKG NSR no acute changes\par GYN schedule\par  pap 2019\par Mammogram 2019\par Colonoscopy 10/20 \par \par Obesity: discussed diet changes\par Food diary/ exercise tracker.\par \par Chronic back pain/ HNP/ DDD/ Spondylosis\par Taking Ibuprofen advised not a goo d option given hx of PUD\par Xray ordered\par Hand numbness: Xray cervical spine\par PT Rx given\par Gabapentin at night\par Follow up in 6-8 weeks\par Consider seeing spine specialist Dr Singletary

## 2021-07-28 NOTE — PHYSICAL EXAM
[Normal Sclera/Conjunctiva] : normal sclera/conjunctiva [Supple] : supple [No Edema] : there was no peripheral edema [No Extremity Clubbing/Cyanosis] : no extremity clubbing/cyanosis [Normal Anterior Cervical Nodes] : no anterior cervical lymphadenopathy [No Joint Swelling] : no joint swelling [Coordination Grossly Intact] : coordination grossly intact [Normal Gait] : normal gait [Normal] : affect was normal and insight and judgment were intact [de-identified] : varicose veins b/l

## 2021-08-03 ENCOUNTER — TRANSCRIPTION ENCOUNTER (OUTPATIENT)
Age: 61
End: 2021-08-03

## 2021-08-03 LAB
25(OH)D3 SERPL-MCNC: 54.7 NG/ML
ALBUMIN SERPL ELPH-MCNC: 4.6 G/DL
ALP BLD-CCNC: 87 U/L
ALT SERPL-CCNC: 15 U/L
ANION GAP SERPL CALC-SCNC: 12 MMOL/L
APPEARANCE: CLEAR
AST SERPL-CCNC: 16 U/L
BASOPHILS # BLD AUTO: 0.01 K/UL
BASOPHILS NFR BLD AUTO: 0.2 %
BILIRUB SERPL-MCNC: 0.3 MG/DL
BILIRUBIN URINE: NEGATIVE
BLOOD URINE: NEGATIVE
BUN SERPL-MCNC: 17 MG/DL
CALCIUM SERPL-MCNC: 9.6 MG/DL
CHLORIDE SERPL-SCNC: 108 MMOL/L
CHOLEST SERPL-MCNC: 171 MG/DL
CO2 SERPL-SCNC: 23 MMOL/L
COLOR: YELLOW
CREAT SERPL-MCNC: 0.63 MG/DL
EOSINOPHIL # BLD AUTO: 0.03 K/UL
EOSINOPHIL NFR BLD AUTO: 0.6 %
ESTIMATED AVERAGE GLUCOSE: 114 MG/DL
GLUCOSE QUALITATIVE U: NEGATIVE
GLUCOSE SERPL-MCNC: 114 MG/DL
HBA1C MFR BLD HPLC: 5.6 %
HCT VFR BLD CALC: 38.4 %
HDLC SERPL-MCNC: 64 MG/DL
HGB BLD-MCNC: 11.9 G/DL
HIV1+2 AB SPEC QL IA.RAPID: NONREACTIVE
IMM GRANULOCYTES NFR BLD AUTO: 0.4 %
KETONES URINE: NEGATIVE
LDLC SERPL CALC-MCNC: 92 MG/DL
LEUKOCYTE ESTERASE URINE: NEGATIVE
LYMPHOCYTES # BLD AUTO: 1.91 K/UL
LYMPHOCYTES NFR BLD AUTO: 36.9 %
MAN DIFF?: NORMAL
MCHC RBC-ENTMCNC: 28.1 PG
MCHC RBC-ENTMCNC: 31 GM/DL
MCV RBC AUTO: 90.6 FL
MONOCYTES # BLD AUTO: 0.37 K/UL
MONOCYTES NFR BLD AUTO: 7.2 %
NEUTROPHILS # BLD AUTO: 2.83 K/UL
NEUTROPHILS NFR BLD AUTO: 54.7 %
NITRITE URINE: NEGATIVE
NONHDLC SERPL-MCNC: 107 MG/DL
PH URINE: 6
PLATELET # BLD AUTO: 220 K/UL
POTASSIUM SERPL-SCNC: 4.4 MMOL/L
PROT SERPL-MCNC: 7.1 G/DL
PROTEIN URINE: NORMAL
RBC # BLD: 4.24 M/UL
RBC # FLD: 14.4 %
SODIUM SERPL-SCNC: 142 MMOL/L
SPECIFIC GRAVITY URINE: 1.03
TRIGL SERPL-MCNC: 76 MG/DL
TSH SERPL-ACNC: 0.98 UIU/ML
URATE SERPL-MCNC: 4.6 MG/DL
UROBILINOGEN URINE: NORMAL
WBC # FLD AUTO: 5.17 K/UL

## 2021-08-04 ENCOUNTER — TRANSCRIPTION ENCOUNTER (OUTPATIENT)
Age: 61
End: 2021-08-04

## 2021-08-26 ENCOUNTER — APPOINTMENT (OUTPATIENT)
Dept: VASCULAR SURGERY | Facility: CLINIC | Age: 61
End: 2021-08-26

## 2021-08-27 NOTE — ASU PATIENT PROFILE, ADULT - PREOP PAIN SCORE
Operative Note      Patient: Jonathon Paredes  YOB: 2001  MRN: [id number]    Date of Procedure: 8/27/2021    Pre-Op Diagnosis:  Left URETERAL CALCULUS    Post-Op Diagnosis: Same       Procedure(s):  CYSTOSCOPY RETROGRADE PYELOGRAM URETEROSCOPY J STENT Stone basket extraction LEFT    Surgeon(s):  Victorino Gore MD    Assistant:   * No surgical staff found *    Anesthesia: Monitor Anesthesia Care    Estimated Blood Loss (mL): Minimal    Complications: None    Specimens:   ID Type Source Tests Collected by Time Destination   A : Left kidney stone Stone (Calculus) [address] Victorino Gore MD 8/27/2021 1301        Implants:  Implant Name Type Inv. Item Serial No.  Lot No. LRB No. Used Action   STENT URET FLX 0.035 IN 6 [id number] CM 6 FR TAPR POLARIS ULTRA  STENT URET FLX 0.035 IN 6 [id number] CM 6 FR TAPR POLARIS ULTRA  [employer] [id number] Left 1 Implanted         Drains:   Ureteral Catheter Left ureter 6 fr (Active)       Findings: Left distal ureteral calculus    Detailed Description of Procedure:     Patient is a 69-year-old female presented my office yesterday with left proximal ureteral calculus. The patient discussed that she was having continued pain. I discussed medical expulsive therapy versus surgery she elected for surgical management all risk benefits and alternatives were discussed with the patient informed consent was obtained and signed    Operation:    The patient was well back in the operative suite. Upon entering the operative suite the patient was identified using name and number. The patient was transferred the operative table and placed in supine position. Anesthesia was delivered without any complication a surgical timeout was taken on the room agreement. 25 Western Bria scope was inserted to the urethra atraumatically and into the urinary bladder. Full cystoscopic examination of bladder revealed right and left ureteral orifice in the normal anatomic position. The right ureteral orifice was normal in caliber. The left ureteral orifice did look inflamed and swollen. At that time a retrograde pyelogram was done on the left side which did reveal a filling defect in the distal ureter. An 035 sensor wire was placed into the ureter. A 6.9 cm ureteroscope was inserted into the ureter atraumatically. We did identify a small stone. Goldsmith stone basket was then used to grab the stone and remove it in its entirety. This was brought out and sent for pathologic diagnosis. At that time the rest of the ureter was cannulated using the ureteroscope and there is no other stone. The ureteroscope was removed the cystoscope was back over the wire a 6 Western Bria by 26 double-J stent was placed over the wire and seen a good curl within the renal pelvis and urinary bladder. Patient's bladder was drained the patient was awoke from anesthesia and taken the PACU in stable condition.     Plan     Cystoscopy stent removal next Thursday or Friday in the office  Stone for chemical analysis    Rebeca Montgomery MD      Electronically signed by Victorino Gore MD on 8/27/2021 at 1:14 PM 2

## 2021-10-13 ENCOUNTER — APPOINTMENT (OUTPATIENT)
Dept: ENDOCRINOLOGY | Facility: CLINIC | Age: 61
End: 2021-10-13

## 2021-10-29 ENCOUNTER — RESULT REVIEW (OUTPATIENT)
Age: 61
End: 2021-10-29

## 2021-10-29 ENCOUNTER — APPOINTMENT (OUTPATIENT)
Dept: RADIOLOGY | Facility: HOSPITAL | Age: 61
End: 2021-10-29
Payer: COMMERCIAL

## 2021-10-29 ENCOUNTER — OUTPATIENT (OUTPATIENT)
Dept: OUTPATIENT SERVICES | Facility: HOSPITAL | Age: 61
LOS: 1 days | End: 2021-10-29

## 2021-10-29 DIAGNOSIS — Z98.890 OTHER SPECIFIED POSTPROCEDURAL STATES: Chronic | ICD-10-CM

## 2021-10-29 DIAGNOSIS — R58 HEMORRHAGE, NOT ELSEWHERE CLASSIFIED: Chronic | ICD-10-CM

## 2021-10-29 DIAGNOSIS — K44.9 DIAPHRAGMATIC HERNIA WITHOUT OBSTRUCTION OR GANGRENE: ICD-10-CM

## 2021-10-29 PROCEDURE — 74220 X-RAY XM ESOPHAGUS 1CNTRST: CPT | Mod: 26

## 2021-11-04 ENCOUNTER — APPOINTMENT (OUTPATIENT)
Dept: THORACIC SURGERY | Facility: CLINIC | Age: 61
End: 2021-11-04

## 2021-12-31 ENCOUNTER — OUTPATIENT (OUTPATIENT)
Dept: OUTPATIENT SERVICES | Facility: HOSPITAL | Age: 61
LOS: 1 days | End: 2021-12-31
Payer: COMMERCIAL

## 2021-12-31 DIAGNOSIS — Z98.890 OTHER SPECIFIED POSTPROCEDURAL STATES: Chronic | ICD-10-CM

## 2021-12-31 DIAGNOSIS — R58 HEMORRHAGE, NOT ELSEWHERE CLASSIFIED: Chronic | ICD-10-CM

## 2021-12-31 DIAGNOSIS — Z20.828 CONTACT WITH AND (SUSPECTED) EXPOSURE TO OTHER VIRAL COMMUNICABLE DISEASES: ICD-10-CM

## 2021-12-31 LAB — SARS-COV-2 RNA SPEC QL NAA+PROBE: DETECTED

## 2021-12-31 PROCEDURE — U0005: CPT

## 2021-12-31 PROCEDURE — U0003: CPT

## 2022-01-18 NOTE — H&P PST ADULT - AIRWAY
[FreeTextEntry1] : This telephonic visit was provided via audio only technology. The patient, NYDIA VALVERDE, was located at home, [address] [place] [address] , at the time of the visit. \par The provider, OMAR TORIBIO, was located at [place], [place] at the time of the visit. The patient, NYDIA VALVERDE and Provider participated in the telephonic visit. Spouse also participated. \par Verbal consent for telephonic services was given on January 18, 2022 by Pt's wife. \par Pt is a 69 yo M with PMHx of L MCA ischemic stroke x 2, HLD, and hypothyroidism, for whom a televisit (audio only) was conducted. Pt referred by Renetta, stroke clinic PA, for further evaluation of stroke etiology and management. Much of history and discussion was with pt's wife, as pt has residual aphasia from his strokes. Pt's initial stroke was in August 2020, presented with Left MCA/ICA, s/p EVT. MRI aruna showed moderate stroke in in left MCA territory, particularly frontoparietal region and small stroke in left ADEOLA as well. Pt was discharged on ASA/statin. He returned in October 2020 with worsening of aphasia and right sided weakness. Again found to have left MCA branch occlusion. CT head showed new ischemia in left basal ganglia. Pt was discharged on ASA/plavix to [facility], where he was found to have a LE DVT and was switched to eliquis monotherapy. He has been taking eliquis since then. He had a JANET that was negative for clot or shunt. S/p loop with no evidence of afib thus far. Pt's wife states that he has been doing well over all. Ambulates with a cane, has some difficulty with speech, peg tube was removed and he has been eating/drinking. recently completed PT/ST.\par No h/o stroke, clot or heart disease. No family h/o storke or clotting disorder, ut he has a brother with afib. No h/o tobacco, alcohol or drug use. \par \par \par . 
normal

## 2022-02-10 ENCOUNTER — EMERGENCY (EMERGENCY)
Facility: HOSPITAL | Age: 62
LOS: 1 days | Discharge: HOME | End: 2022-02-10
Attending: EMERGENCY MEDICINE
Payer: COMMERCIAL

## 2022-02-10 VITALS
WEIGHT: 190.04 LBS | RESPIRATION RATE: 18 BRPM | SYSTOLIC BLOOD PRESSURE: 179 MMHG | HEIGHT: 63 IN | DIASTOLIC BLOOD PRESSURE: 81 MMHG | HEART RATE: 77 BPM | OXYGEN SATURATION: 98 % | TEMPERATURE: 98 F

## 2022-02-10 DIAGNOSIS — R58 HEMORRHAGE, NOT ELSEWHERE CLASSIFIED: Chronic | ICD-10-CM

## 2022-02-10 DIAGNOSIS — Z98.890 OTHER SPECIFIED POSTPROCEDURAL STATES: Chronic | ICD-10-CM

## 2022-02-10 LAB
ALBUMIN SERPL ELPH-MCNC: 4.5 G/DL — SIGNIFICANT CHANGE UP (ref 3.3–5.2)
ALP SERPL-CCNC: 77 U/L — SIGNIFICANT CHANGE UP (ref 40–120)
ALT FLD-CCNC: 17 U/L — SIGNIFICANT CHANGE UP
ANION GAP SERPL CALC-SCNC: 11 MMOL/L — SIGNIFICANT CHANGE UP (ref 5–17)
APPEARANCE UR: CLEAR — SIGNIFICANT CHANGE UP
AST SERPL-CCNC: 18 U/L — SIGNIFICANT CHANGE UP
BACTERIA # UR AUTO: ABNORMAL
BASOPHILS # BLD AUTO: 0.02 K/UL — SIGNIFICANT CHANGE UP (ref 0–0.2)
BASOPHILS NFR BLD AUTO: 0.3 % — SIGNIFICANT CHANGE UP (ref 0–2)
BILIRUB SERPL-MCNC: 0.4 MG/DL — SIGNIFICANT CHANGE UP (ref 0.4–2)
BILIRUB UR-MCNC: NEGATIVE — SIGNIFICANT CHANGE UP
BUN SERPL-MCNC: 15.7 MG/DL — SIGNIFICANT CHANGE UP (ref 8–20)
CALCIUM SERPL-MCNC: 9.1 MG/DL — SIGNIFICANT CHANGE UP (ref 8.6–10.2)
CHLORIDE SERPL-SCNC: 106 MMOL/L — SIGNIFICANT CHANGE UP (ref 98–107)
CO2 SERPL-SCNC: 24 MMOL/L — SIGNIFICANT CHANGE UP (ref 22–29)
COLOR SPEC: YELLOW — SIGNIFICANT CHANGE UP
CREAT SERPL-MCNC: 0.56 MG/DL — SIGNIFICANT CHANGE UP (ref 0.5–1.3)
DIFF PNL FLD: ABNORMAL
EOSINOPHIL # BLD AUTO: 0.02 K/UL — SIGNIFICANT CHANGE UP (ref 0–0.5)
EOSINOPHIL NFR BLD AUTO: 0.3 % — SIGNIFICANT CHANGE UP (ref 0–6)
EPI CELLS # UR: SIGNIFICANT CHANGE UP
FLUAV AG NPH QL: SIGNIFICANT CHANGE UP
FLUBV AG NPH QL: SIGNIFICANT CHANGE UP
GLUCOSE SERPL-MCNC: 90 MG/DL — SIGNIFICANT CHANGE UP (ref 70–99)
GLUCOSE UR QL: NEGATIVE MG/DL — SIGNIFICANT CHANGE UP
HCT VFR BLD CALC: 38.7 % — SIGNIFICANT CHANGE UP (ref 34.5–45)
HGB BLD-MCNC: 12.1 G/DL — SIGNIFICANT CHANGE UP (ref 11.5–15.5)
IMM GRANULOCYTES NFR BLD AUTO: 0.7 % — SIGNIFICANT CHANGE UP (ref 0–1.5)
KETONES UR-MCNC: ABNORMAL
LEUKOCYTE ESTERASE UR-ACNC: ABNORMAL
LIDOCAIN IGE QN: 23 U/L — SIGNIFICANT CHANGE UP (ref 22–51)
LYMPHOCYTES # BLD AUTO: 2.52 K/UL — SIGNIFICANT CHANGE UP (ref 1–3.3)
LYMPHOCYTES # BLD AUTO: 34.9 % — SIGNIFICANT CHANGE UP (ref 13–44)
MCHC RBC-ENTMCNC: 27.4 PG — SIGNIFICANT CHANGE UP (ref 27–34)
MCHC RBC-ENTMCNC: 31.3 GM/DL — LOW (ref 32–36)
MCV RBC AUTO: 87.6 FL — SIGNIFICANT CHANGE UP (ref 80–100)
MONOCYTES # BLD AUTO: 0.49 K/UL — SIGNIFICANT CHANGE UP (ref 0–0.9)
MONOCYTES NFR BLD AUTO: 6.8 % — SIGNIFICANT CHANGE UP (ref 2–14)
NEUTROPHILS # BLD AUTO: 4.13 K/UL — SIGNIFICANT CHANGE UP (ref 1.8–7.4)
NEUTROPHILS NFR BLD AUTO: 57 % — SIGNIFICANT CHANGE UP (ref 43–77)
NITRITE UR-MCNC: NEGATIVE — SIGNIFICANT CHANGE UP
PH UR: 7 — SIGNIFICANT CHANGE UP (ref 5–8)
PLATELET # BLD AUTO: 231 K/UL — SIGNIFICANT CHANGE UP (ref 150–400)
POTASSIUM SERPL-MCNC: 4.6 MMOL/L — SIGNIFICANT CHANGE UP (ref 3.5–5.3)
POTASSIUM SERPL-SCNC: 4.6 MMOL/L — SIGNIFICANT CHANGE UP (ref 3.5–5.3)
PROT SERPL-MCNC: 7.4 G/DL — SIGNIFICANT CHANGE UP (ref 6.6–8.7)
PROT UR-MCNC: NEGATIVE — SIGNIFICANT CHANGE UP
RBC # BLD: 4.42 M/UL — SIGNIFICANT CHANGE UP (ref 3.8–5.2)
RBC # FLD: 13.6 % — SIGNIFICANT CHANGE UP (ref 10.3–14.5)
RBC CASTS # UR COMP ASSIST: SIGNIFICANT CHANGE UP /HPF (ref 0–4)
RSV RNA NPH QL NAA+NON-PROBE: SIGNIFICANT CHANGE UP
SARS-COV-2 RNA SPEC QL NAA+PROBE: SIGNIFICANT CHANGE UP
SODIUM SERPL-SCNC: 141 MMOL/L — SIGNIFICANT CHANGE UP (ref 135–145)
SP GR SPEC: 1.01 — SIGNIFICANT CHANGE UP (ref 1.01–1.02)
TROPONIN T SERPL-MCNC: <0.01 NG/ML — SIGNIFICANT CHANGE UP (ref 0–0.06)
UROBILINOGEN FLD QL: NEGATIVE MG/DL — SIGNIFICANT CHANGE UP
WBC # BLD: 7.23 K/UL — SIGNIFICANT CHANGE UP (ref 3.8–10.5)
WBC # FLD AUTO: 7.23 K/UL — SIGNIFICANT CHANGE UP (ref 3.8–10.5)
WBC UR QL: SIGNIFICANT CHANGE UP /HPF (ref 0–5)

## 2022-02-10 PROCEDURE — 93010 ELECTROCARDIOGRAM REPORT: CPT

## 2022-02-10 PROCEDURE — 71046 X-RAY EXAM CHEST 2 VIEWS: CPT | Mod: 26

## 2022-02-10 PROCEDURE — 99285 EMERGENCY DEPT VISIT HI MDM: CPT

## 2022-02-10 RX ORDER — ONDANSETRON 8 MG/1
4 TABLET, FILM COATED ORAL ONCE
Refills: 0 | Status: COMPLETED | OUTPATIENT
Start: 2022-02-10 | End: 2022-02-10

## 2022-02-10 RX ORDER — FAMOTIDINE 10 MG/ML
20 INJECTION INTRAVENOUS ONCE
Refills: 0 | Status: COMPLETED | OUTPATIENT
Start: 2022-02-10 | End: 2022-02-10

## 2022-02-10 RX ADMIN — FAMOTIDINE 20 MILLIGRAM(S): 10 INJECTION INTRAVENOUS at 22:52

## 2022-02-10 RX ADMIN — Medication 30 MILLILITER(S): at 20:44

## 2022-02-10 NOTE — ED PROVIDER NOTE - CLINICAL SUMMARY MEDICAL DECISION MAKING FREE TEXT BOX
62 y/o F with PMHx  robles's esophagus, hiatal hernia repair (2019), GERD, peptic ulcers, osteoarthritis, who presents to the ED c/o of worsening chest pain and fatigue for the past four days. ACS ruleout, pepcid, maalox, reassess.

## 2022-02-10 NOTE — ED PROVIDER NOTE - NSFOLLOWUPINSTRUCTIONS_ED_ALL_ED_FT
Follow up with Cardiology- call to schedule appt   Follow up with Thoracic Surgery regarding the incidental finding of pulmonary nodule  Return  sooner for any problems     Chest Pain    Chest pain can be caused by many different conditions which may or may not be dangerous. Causes include heartburn, lung infections, heart attack, blood clot in lungs, skin infections, strain or damage to muscle, cartilage, or bones, etc. In addition to a history and physical examination, an electrocardiogram (ECG) or other lab tests may have been performed to determine the cause of your chest pain. Follow up with your primary care provider or with a cardiologist as instructed.     SEEK IMMEDIATE MEDICAL CARE IF YOU HAVE ANY OF THE FOLLOWING SYMPTOMS: worsening chest pain, coughing up blood, unexplained back/neck/jaw pain, severe abdominal pain, dizziness or lightheadedness, fainting, shortness of breath, sweaty or clammy skin, vomiting, or racing heart beat. These symptoms may represent a serious problem that is an emergency. Do not wait to see if the symptoms will go away. Get medical help right away. Call 911 and do not drive yourself to the hospital.

## 2022-02-10 NOTE — ED PROVIDER NOTE - PATIENT PORTAL LINK FT
You can access the FollowMyHealth Patient Portal offered by [facility] by registering at the following website: [email or web address]. By joining [employer]’s FollowMyHealth portal, you will also be able to view your health information using other applications (apps) compatible with our system.

## 2022-02-10 NOTE — ED PROVIDER NOTE - NS ED ROS FT
Constitutional: no fever, no chills  Head: NC, AT   Eyes: no redness   ENMT: no nasal congestion/drainage, no sore throat   CV: + chest pain, no edema  Resp: no cough, no dyspnea  GI: no abdominal pain, no nausea, no vomiting, no diarrhea  : no dysuria, no hematuria   Skin: no lesions, no rashes   Neuro: no LOC, no headache, no sensory deficits

## 2022-02-10 NOTE — ED PROVIDER NOTE - PROGRESS NOTE DETAILS
CTA results as noted.  Pt advised of findings including pulmonary nodule and need to f/u as outpt.  Will refer to Cardiology and Thoracic Surgery as outpt

## 2022-02-10 NOTE — ED PROVIDER NOTE - ATTENDING CONTRIBUTION TO CARE
Mila BYERS- 62 Y/O F with h/o htn, gerd, barretts esophagus p/w mid sternal chest pain with sob for 4 day with  mild cough but no fever, chills and also has ruq pain . No fall or trauma. Non smoker, pt has fhx of aneurysms in the family. Pt has no cardiologist and never had cardiac testing.    Pt is alert, well appearing female, s1s2 normal reg, b/l clear  breath sounds, abd soft, nt,  nd, neuro exam aox3, cn 2-12 intact,  no focal deficits, skin warm, dry, good turgor, no cvat b/l,    will r/o acs, aortic dissection, uti, r/o pancreatitis, pna

## 2022-02-10 NOTE — ED PROVIDER NOTE - CARE PROVIDERS DIRECT ADDRESSES
,DirectAddress_Unknown,[email or web address]

## 2022-02-10 NOTE — ED PROVIDER NOTE - PHYSICAL EXAMINATION
General: well appearing, NAD  Head: NC, AT  EENT: no scleral icterus  Cardiac: RRR, no apparent murmurs, no lower extremity edema  Respiratory: CTABL, no respiratory distress   Abdomen: soft, ND, NT, nonperitonitic  MSK/Vascular: soft compartments, warm extremities  Neuro: AAOx3, sensation to light touch intact  Psych: calm, cooperative

## 2022-02-10 NOTE — ED PROVIDER NOTE - CARE PROVIDER_API CALL
Solomon Parrish)  Cardiovascular Disease  [address], [address]  [place], [place] [id number]  Phone: [phone]  Fax: [phone]  Follow Up Time:     David Zepeda)  Thoracic Surgery  [address] [place] [address]  Phone: [phone]  Fax: [phone]  Follow Up Time:

## 2022-02-10 NOTE — ED PROVIDER NOTE - OBJECTIVE STATEMENT
60 y/o F with PMHx  robles's esophagus, hiatal hernia repair (2019), GERD, peptic ulcers, osteoarthritis, who presents to the ED c/o of worsening chest pain and fatigue for the past four days. Patient states that she can not localize the chest pain, but states that it is intermittent and does not radiate. She also states that she felt some palpitations and lightheadedness as well, but denies any changes in vision, diaphoresis, syncope, or dyspnea. States that she last saw a cardiologist 10 years ago and has not followed up since. Denies any significant cardiac history.

## 2022-02-10 NOTE — ED PROVIDER NOTE - HIV OFFER
WOUND CARE:  Please keep incisions clean and dry. Please do not Scrub or rub incisions. Do not use lotion or powder on incisions.   BATHING: Please do not submerge wound underwater. You may shower and/or sponge bathe.  ACTIVITY: No heavy lifting or straining until your follow up appointment. Otherwise, you may return to your usual level of physical activity. If you are taking narcotic pain medication (such as Percocet) DO NOT drive a car, operate machinery or make important decisions.  DIET: Return to your usual diet.  NOTIFY YOUR SURGEON IF: You have any bleeding that does not stop, any pus draining from your wound(s), any fever (over 100.4 F) or chills, persistent nausea/vomiting, persistent diarrhea, or if your pain is not controlled on your discharge pain medications.  FOLLOW-UP: Please follow-up with your surgeon 2 weeks following discharge- please call to schedule an appointment.  PAIN CONTROL: A prescription for oxycodon has been sent to your pharmacy. You should only take these for severe pain. For mild or moderate pain, you may take 975mg of tylenol every 6 hours. You may also take ibuprofen 400mg every 6 hours. It is most effective to alternate these two medicines with each other, 3 hours apart.
Opt out

## 2022-02-11 ENCOUNTER — APPOINTMENT (OUTPATIENT)
Dept: FAMILY MEDICINE | Facility: CLINIC | Age: 62
End: 2022-02-11
Payer: COMMERCIAL

## 2022-02-11 ENCOUNTER — NON-APPOINTMENT (OUTPATIENT)
Age: 62
End: 2022-02-11

## 2022-02-11 VITALS
SYSTOLIC BLOOD PRESSURE: 145 MMHG | TEMPERATURE: 98 F | OXYGEN SATURATION: 99 % | RESPIRATION RATE: 18 BRPM | DIASTOLIC BLOOD PRESSURE: 78 MMHG | HEART RATE: 70 BPM

## 2022-02-11 VITALS
WEIGHT: 196 LBS | SYSTOLIC BLOOD PRESSURE: 152 MMHG | BODY MASS INDEX: 34.73 KG/M2 | HEART RATE: 73 BPM | OXYGEN SATURATION: 99 % | HEIGHT: 63 IN | RESPIRATION RATE: 16 BRPM | DIASTOLIC BLOOD PRESSURE: 70 MMHG | TEMPERATURE: 97.9 F

## 2022-02-11 DIAGNOSIS — R06.00 DYSPNEA, UNSPECIFIED: ICD-10-CM

## 2022-02-11 PROCEDURE — 85025 COMPLETE CBC W/AUTO DIFF WBC: CPT

## 2022-02-11 PROCEDURE — 74174 CTA ABD&PLVS W/CONTRAST: CPT | Mod: 26,MA

## 2022-02-11 PROCEDURE — 36415 COLL VENOUS BLD VENIPUNCTURE: CPT

## 2022-02-11 PROCEDURE — 80053 COMPREHEN METABOLIC PANEL: CPT

## 2022-02-11 PROCEDURE — 81001 URINALYSIS AUTO W/SCOPE: CPT

## 2022-02-11 PROCEDURE — 71275 CT ANGIOGRAPHY CHEST: CPT | Mod: MA

## 2022-02-11 PROCEDURE — 99214 OFFICE O/P EST MOD 30 MIN: CPT

## 2022-02-11 PROCEDURE — 93005 ELECTROCARDIOGRAM TRACING: CPT

## 2022-02-11 PROCEDURE — 83690 ASSAY OF LIPASE: CPT

## 2022-02-11 PROCEDURE — 74174 CTA ABD&PLVS W/CONTRAST: CPT | Mod: MA

## 2022-02-11 PROCEDURE — 87637 SARSCOV2&INF A&B&RSV AMP PRB: CPT

## 2022-02-11 PROCEDURE — 99285 EMERGENCY DEPT VISIT HI MDM: CPT | Mod: 25

## 2022-02-11 PROCEDURE — 84484 ASSAY OF TROPONIN QUANT: CPT

## 2022-02-11 PROCEDURE — 96374 THER/PROPH/DIAG INJ IV PUSH: CPT | Mod: XU

## 2022-02-11 PROCEDURE — 71275 CT ANGIOGRAPHY CHEST: CPT | Mod: 26,MA

## 2022-02-11 PROCEDURE — 87086 URINE CULTURE/COLONY COUNT: CPT

## 2022-02-11 PROCEDURE — 71046 X-RAY EXAM CHEST 2 VIEWS: CPT

## 2022-02-11 RX ORDER — IBUPROFEN 800 MG/1
800 TABLET, FILM COATED ORAL
Qty: 60 | Refills: 0 | Status: COMPLETED | COMMUNITY
End: 2022-02-11

## 2022-02-11 RX ORDER — GABAPENTIN 300 MG/1
300 CAPSULE ORAL
Qty: 180 | Refills: 0 | Status: COMPLETED | COMMUNITY
Start: 2021-07-28 | End: 2022-02-11

## 2022-02-11 NOTE — ED ADULT NURSE REASSESSMENT NOTE - NS ED NURSE REASSESS COMMENT FT1
patient back from cta, in stable condition, patient was checked every now and the in the hallway while waiting for CTA. no sign of distress

## 2022-02-12 LAB
CULTURE RESULTS: SIGNIFICANT CHANGE UP
SPECIMEN SOURCE: SIGNIFICANT CHANGE UP

## 2022-02-13 NOTE — PLAN
[FreeTextEntry1] : Advised to see cardiology with chest pain and shortness of breath/dyspnea on exertion\par  risk factors include age obesity and hypertension\par Pulmonary nodule follow-up CT in 3 to 6 months\par Thyroid nodule ultrasound Rx given\par Work-up was negative including no evidence of PE on CT chest.  Troponins were negative.\par

## 2022-02-13 NOTE — REVIEW OF SYSTEMS
[Fatigue] : fatigue [Chest Pain] : chest pain [Palpitations] : palpitations [Leg Claudication] : no leg claudication [Lower Ext Edema] : no lower extremity edema [Orthopnea] : no orthopnea [Paroxysmal Nocturnal Dyspnea] : no paroxysmal nocturnal dyspnea [Negative] : Heme/Lymph [FreeTextEntry9] : see hpi

## 2022-02-13 NOTE — PHYSICAL EXAM
[Normal Sclera/Conjunctiva] : normal sclera/conjunctiva [Supple] : supple [No Edema] : there was no peripheral edema [No Extremity Clubbing/Cyanosis] : no extremity clubbing/cyanosis [Normal Anterior Cervical Nodes] : no anterior cervical lymphadenopathy [No Joint Swelling] : no joint swelling [Coordination Grossly Intact] : coordination grossly intact [Normal Gait] : normal gait [Normal] : affect was normal and insight and judgment were intact [de-identified] : varicose veins b/l

## 2022-02-13 NOTE — HISTORY OF PRESENT ILLNESS
[FreeTextEntry8] : chest pain atypical left sided with deep breathing\par Not related to activity. She had covid 12/21 she was asymptomatic for a while \par Then last week started feeling weak, with shortness of breath..\par Seen in ER yesterday\par CT chest and abdomen done\par No clots subcentimeter thyroid nodule on the right\par 9 mm pulmonary nodule\par Trop negative\par Labs okay

## 2022-02-14 ENCOUNTER — APPOINTMENT (OUTPATIENT)
Dept: CARDIOLOGY | Facility: CLINIC | Age: 62
End: 2022-02-14
Payer: COMMERCIAL

## 2022-02-14 ENCOUNTER — NON-APPOINTMENT (OUTPATIENT)
Age: 62
End: 2022-02-14

## 2022-02-14 VITALS
BODY MASS INDEX: 34.73 KG/M2 | DIASTOLIC BLOOD PRESSURE: 80 MMHG | WEIGHT: 196 LBS | HEART RATE: 65 BPM | SYSTOLIC BLOOD PRESSURE: 140 MMHG | OXYGEN SATURATION: 99 % | HEIGHT: 63 IN

## 2022-02-14 DIAGNOSIS — R07.89 OTHER CHEST PAIN: ICD-10-CM

## 2022-02-14 DIAGNOSIS — R53.83 OTHER FATIGUE: ICD-10-CM

## 2022-02-14 PROCEDURE — 99204 OFFICE O/P NEW MOD 45 MIN: CPT

## 2022-02-14 PROCEDURE — 93000 ELECTROCARDIOGRAM COMPLETE: CPT

## 2022-02-15 LAB
THYROGLOB AB SERPL-ACNC: <20 IU/ML
THYROPEROXIDASE AB SERPL IA-ACNC: <10 IU/ML
TSI ACT/NOR SER: <0.1 IU/L

## 2022-02-18 ENCOUNTER — APPOINTMENT (OUTPATIENT)
Dept: CARDIOLOGY | Facility: CLINIC | Age: 62
End: 2022-02-18
Payer: COMMERCIAL

## 2022-02-18 PROCEDURE — 93306 TTE W/DOPPLER COMPLETE: CPT

## 2022-03-17 ENCOUNTER — NON-APPOINTMENT (OUTPATIENT)
Age: 62
End: 2022-03-17

## 2022-03-17 NOTE — PHYSICAL EXAM
[Obese] : obese [Normal Conjunctiva] : normal conjunctiva [Normal Venous Pressure] : normal venous pressure [Normal] : clear lung fields, good air entry, no respiratory distress [Soft] : abdomen soft [Non Tender] : non-tender [Normal Bowel Sounds] : normal bowel sounds [No Edema] : no edema [No Cyanosis] : no cyanosis [No Varicosities] : no varicosities [No Rash] : no rash [No Skin Lesions] : no skin lesions [Moves all extremities] : moves all extremities [Alert and Oriented] : alert and oriented [Normal S1, S2] : normal S1, S2 [No Rub] : no rub [No Gallop] : no gallop [Murmur] : murmur [Normal Gait] : normal gait [de-identified] : 2/6 SM  [de-identified] : knee pain

## 2022-03-17 NOTE — ASSESSMENT
[FreeTextEntry1] : 61 year old female with above hx s/p covid infection a month ago \par \par who developed intermittent brief palpitation , atypical chest pain   ,: who had  negative troponin , CT angio for PE \par would recommend  echocardiogram to assess ventricular function . chemical  nuclear stress test to rule out ischemia  as patient can not walk fast due to knee pain , 7 days extended holter monitor  to correlate her symptoms with underlying arrhythmia  \par \par \par abnormal EKG  sinus rhythm left axis LAFB  hx sarcoidosis  :  possible underlying hypertensive heart disease : would obtain echo to assess ventricular function , \par \par Hypertension  possible hypertensive heart disease   mild uncontrolled HTN  advised the patient to follow low salt diet  home BP check ,  continue medication \par \par Fatigue  possibly due to sleep deprivation , ? if persists will obtain sleep apnea study \par \par Cardiac murmur : possible aortic sclerosis , radiating to carotids    echo \par \par follow up after above mentioned tests , advised the patient to seek medical attention if symptoms severity changes

## 2022-03-17 NOTE — HISTORY OF PRESENT ILLNESS
[FreeTextEntry1] : 61 year old female with hx of hypertension obesity GERD hiatal hernia s/p covid jan 2022 came for cardiac evaluation with complain that she is not feeling well for last one week , patient  recovered from Covid infection early part of jan , patient started having  fluttery sensation in chest with feeling of taking deep breaths for 3days  then started intermittent sharp pain , pressure like  lasts for less than a minute , not associated with shortness of breath , felt worse on friday  , went to ER , where she had work up , showed normal troponin ,  no evidence of PE OR  dissection ,    Patient chest pain not related , patient also feel tired , she does not sleep well at for last 2 to 3 weeks ,  not sure whether she snores or not \par \par hospital records reviewed ,  normal troponin , normal Blood work   her blood pressure is mild elevated as she was not compliant to low salt diet \par \par \par Hospital visit records reviewed

## 2022-03-23 ENCOUNTER — NON-APPOINTMENT (OUTPATIENT)
Age: 62
End: 2022-03-23

## 2022-03-24 ENCOUNTER — APPOINTMENT (OUTPATIENT)
Dept: CARDIOLOGY | Facility: CLINIC | Age: 62
End: 2022-03-24
Payer: COMMERCIAL

## 2022-03-24 PROCEDURE — A9500: CPT

## 2022-03-24 PROCEDURE — 93015 CV STRESS TEST SUPVJ I&R: CPT

## 2022-03-24 PROCEDURE — 78452 HT MUSCLE IMAGE SPECT MULT: CPT

## 2022-03-28 ENCOUNTER — NON-APPOINTMENT (OUTPATIENT)
Age: 62
End: 2022-03-28

## 2022-04-07 ENCOUNTER — APPOINTMENT (OUTPATIENT)
Dept: CARDIOLOGY | Facility: CLINIC | Age: 62
End: 2022-04-07
Payer: COMMERCIAL

## 2022-04-07 ENCOUNTER — NON-APPOINTMENT (OUTPATIENT)
Age: 62
End: 2022-04-07

## 2022-04-07 VITALS
WEIGHT: 195 LBS | HEART RATE: 68 BPM | HEIGHT: 63 IN | OXYGEN SATURATION: 99 % | SYSTOLIC BLOOD PRESSURE: 138 MMHG | DIASTOLIC BLOOD PRESSURE: 82 MMHG | BODY MASS INDEX: 34.55 KG/M2

## 2022-04-07 VITALS — SYSTOLIC BLOOD PRESSURE: 150 MMHG | DIASTOLIC BLOOD PRESSURE: 80 MMHG

## 2022-04-07 DIAGNOSIS — R07.9 CHEST PAIN, UNSPECIFIED: ICD-10-CM

## 2022-04-07 PROCEDURE — 93000 ELECTROCARDIOGRAM COMPLETE: CPT

## 2022-04-07 PROCEDURE — 99214 OFFICE O/P EST MOD 30 MIN: CPT

## 2022-04-07 NOTE — PHYSICAL EXAM
[Normal Conjunctiva] : normal conjunctiva [Obese] : obese [Normal Venous Pressure] : normal venous pressure [Normal S1, S2] : normal S1, S2 [No Rub] : no rub [No Gallop] : no gallop [Murmur] : murmur [Normal] : clear lung fields, good air entry, no respiratory distress [Soft] : abdomen soft [Non Tender] : non-tender [Normal Bowel Sounds] : normal bowel sounds [Normal Gait] : normal gait [No Edema] : no edema [No Cyanosis] : no cyanosis [No Varicosities] : no varicosities [No Rash] : no rash [No Skin Lesions] : no skin lesions [Moves all extremities] : moves all extremities [Alert and Oriented] : alert and oriented [Venous varicosities] : venous varicosities [de-identified] : 2/6 SM  [de-identified] : knee pain  [de-identified] : extensive LE varicosities

## 2022-04-07 NOTE — CARDIOLOGY SUMMARY
[de-identified] : 4/7/22  normal sinus rhythm  [de-identified] : 3/24/22 no ischemia on chemical stress test  [de-identified] : 2/18/22  EF 66%  mild TR

## 2022-04-07 NOTE — ASSESSMENT
[FreeTextEntry1] : 61 year old female with above hx s/p covid infection a month ago \par \par who developed intermittent brief palpitation , atypical chest pain   ,: who had  negative troponin , CT angio for PE \par had normal chemical stress test , normal LVEF ,   , 7 days extended holter monitor  to correlate her symptoms with underlying arrhythmia  \par \par \par abnormal EKG  sinus rhythm left axis LAFB  hx sarcoidosis  :  possible underlying hypertensive heart disease :  no evidnece of ischemia \par \par Hypertension  possible hypertensive heart disease   mild uncontrolled HTN  advised the patient to follow low salt diet  home BP check ,  will increase norvasc to 10 mg po daily , decrease use of ibuprofen  \par \par Fatigue  better after she was able to sleep well , with pain improvement  with intra articular injection of knees \par \par Cardiac murmur : possible aortic sclerosis , \par \par bilateral varicose veins right more than left   recommended vascular evaluation , \par \par  follow up after 6 weeks

## 2022-07-12 ENCOUNTER — NON-APPOINTMENT (OUTPATIENT)
Age: 62
End: 2022-07-12

## 2022-07-14 ENCOUNTER — NON-APPOINTMENT (OUTPATIENT)
Age: 62
End: 2022-07-14

## 2022-07-18 ENCOUNTER — FORM ENCOUNTER (OUTPATIENT)
Age: 62
End: 2022-07-18

## 2022-07-18 ENCOUNTER — APPOINTMENT (OUTPATIENT)
Dept: ORTHOPEDIC SURGERY | Facility: CLINIC | Age: 62
End: 2022-07-18

## 2022-07-18 VITALS
WEIGHT: 177 LBS | BODY MASS INDEX: 30.22 KG/M2 | DIASTOLIC BLOOD PRESSURE: 79 MMHG | SYSTOLIC BLOOD PRESSURE: 165 MMHG | HEIGHT: 64 IN | HEART RATE: 65 BPM

## 2022-07-18 DIAGNOSIS — M25.561 PAIN IN RIGHT KNEE: ICD-10-CM

## 2022-07-18 DIAGNOSIS — M25.562 PAIN IN RIGHT KNEE: ICD-10-CM

## 2022-07-18 PROCEDURE — 73562 X-RAY EXAM OF KNEE 3: CPT | Mod: RT

## 2022-07-18 PROCEDURE — 20610 DRAIN/INJ JOINT/BURSA W/O US: CPT | Mod: LT

## 2022-07-18 PROCEDURE — 99204 OFFICE O/P NEW MOD 45 MIN: CPT | Mod: 25

## 2022-07-18 NOTE — PROCEDURE
[de-identified] : Injection: Left knee joint.\par Indication: Osteoarthritis.\par \par A discussion was had with the patient regarding this procedure and all questions were answered. All risks, benefits and alternatives were discussed. These included but were not limited to bleeding, infection, allergic reaction and reaccumulation of fluid. A timeout was done to ensure correct side and patient agreed to the procedure.  A Napaimute jennifer was created on the skin utilizing a plastic needle cap to jennifer the anticipated point of entry.  Alcohol was used to clean the skin, and betadine was used to sterilize and prep the area in the lateral joint line aspect of the knee. Ethyl chloride spray was then used as a topical anesthetic. A 22-gauge needle was used to inject 2cc of 0.25% bupivacaine and 1cc of 40mg/ml methylprednisolone into the knee. A sterile bandage was then applied. The patient tolerated the procedure well.

## 2022-07-18 NOTE — DISCUSSION/SUMMARY
[de-identified] : Discussed findings of today's exam and possible causes of patient's pain.  Educated patient on their most probable diagnosis of chronic intermittent bilateral knee pain with recent atraumatic exacerbation particularly of the left knee due to mild to moderate osteoarthritis.  Reviewed possible courses of treatment, and we collaboratively decided best course of treatment at this time will include conservative management.  We discussed various treatment options as well as associated risk/benefits/alternatives and patient elected to proceed with cortisone injection today (see procedure note).  Informed the patient that the numbing medicine in today's injection will last for about 4-6 hours. The steroid that was injected will start to work in 1 to 2 days, peak at 1-2 weeks, and may last up to 1-2 months.  Patient will continue on her course of physical therapy.  We also discussed utilization of hyaluronic acid injection therapy as a more long-term preventative option for the bilateral knees.  Patient would like to proceed with insurance authorization for these injections.  Patient advised I recommend at least 1 month in between today's cortisone injection and proceeding with any HA injections.  Follow up as needed.  Patient appreciates and agrees with current plan.\par \par I work as part of an academic orthopedic group and routinely have a physician in training (resident / fellow) working with me.  Any part of the history and physical exam performed by the physician in training was either directly reviewed and/or replicated by myself.  Any procedure performed by the physician in training was performed under my direct supervision and with the consent of the patient.\par \par This note was generated using dragon medical dictation software.  A reasonable effort has been made for proofreading its contents, but typos may still remain.  If there are any questions or points of clarification needed please notify my office.\par

## 2022-07-18 NOTE — PHYSICAL EXAM
[de-identified] : Constitutional: Well-nourished, well-developed, No acute distress\par Respiratory:  Good respiratory effort, no SOB\par Psychiatric: Pleasant and normal affect, alert and oriented x3\par Musculoskeletal: normal except where as noted in regional exam\par \par Bilateral Knees:\par APPEARANCE: + enlargement of the distal femur and proximal tibia, no deformity, no swelling, + varicose veins of b/l LE\par POSITIVE TENDERNESS:  Distal femur, proximal tibia, medial jt line/retinaculum, and lateral jt line/retinaculum\par NONTENDER: patellar & quadriceps tendons, MCL/LCL, ITB at the lateral femoral condyle & Gerdy's tubercle, pes bursa. \par ROM: full extension, limited flexion of the left knee to 120° due to stiffness and pain.  Full ROM of the right knee  \par RESISTIVE TESTING: painless resisted knee flex/ext, although + crepitus felt in anterior knee. \par SPECIAL TESTS: stable v/v stress. painless grind. neg ant/post drawer. + Jozef's for pain in medial and lateral joint line. \par \par  [de-identified] : The following radiographs were ordered and read by me during this patient's visit. I reviewed each radiograph in detail with the patient and discussed the findings as highlighted below. \par \par 3 views of the bilateral knees were obtained today that show degenerative changes and evidence of mild to moderate tricompartmental osteoarthritis with lateral joint line narrowing, more noticeable in the left knee than the right.  No fracture, or dislocation seen at this time. There is no malalignment. No other obvious osseous abnormality. Otherwise unremarkable.

## 2022-07-18 NOTE — HISTORY OF PRESENT ILLNESS
[de-identified] : Patient is here for b/l knee pain that began about 2 years ago without inciting injury. She was seen by a different provider about 8 months ago who took xrays and told her that she has OA. She was recommended to have surgery but she felt like her pain was more tendon related. She was treated with an injection and PT which provided relief until recently when she went on a trip. She is taking Aleve for pain relief. \par \par The patient's past medical history, past surgical history, medications and allergies were reviewed by me today and documented accordingly. In addition, the patient's family and social history, which were noncontributory to this visit, were reviewed also. Intake form was reviewed. The patient has no family history of arthritis.

## 2022-07-20 ENCOUNTER — NON-APPOINTMENT (OUTPATIENT)
Age: 62
End: 2022-07-20

## 2022-08-02 ENCOUNTER — APPOINTMENT (OUTPATIENT)
Dept: FAMILY MEDICINE | Facility: CLINIC | Age: 62
End: 2022-08-02

## 2022-08-02 VITALS
RESPIRATION RATE: 16 BRPM | WEIGHT: 179 LBS | HEIGHT: 64 IN | BODY MASS INDEX: 30.56 KG/M2 | DIASTOLIC BLOOD PRESSURE: 76 MMHG | OXYGEN SATURATION: 99 % | HEART RATE: 69 BPM | TEMPERATURE: 97.7 F | SYSTOLIC BLOOD PRESSURE: 132 MMHG

## 2022-08-02 PROCEDURE — 99214 OFFICE O/P EST MOD 30 MIN: CPT

## 2022-08-02 RX ORDER — PANTOPRAZOLE 40 MG/1
40 TABLET, DELAYED RELEASE ORAL
Qty: 30 | Refills: 2 | Status: COMPLETED | COMMUNITY
Start: 2021-03-18 | End: 2022-08-02

## 2022-08-02 RX ORDER — IBUPROFEN 800 MG/1
800 TABLET, FILM COATED ORAL
Refills: 0 | Status: COMPLETED | COMMUNITY
End: 2022-08-02

## 2022-08-02 NOTE — HISTORY OF PRESENT ILLNESS
[FreeTextEntry1] : Follow up [de-identified] : Ms. DAYSI LIU is a 62 year old female here for a follow up.\par Seen by Cardiologist BP high increased Amlodipine\par Lost 18 lbs since last visit trying\par Lacks energy, feels it is difficult to catch up at work. Low mood.\par Pain mid back for 3-4 years constant for 1 year.\par Standing and laying on back makes it better. Sitting makes it worse.\par No PT. Had x ray done [facility], Spondylosis. Done at [facility] a year ago 8/20.\par States that she also has numbness in the hands.\par tried gabapentin previously helped a little but can only take it at night.\par \par Gastric Ulcer/ Hiatal hernia had repair Dr Gallagher.\par Dr Edwards did her endoscopy. Dr Li is her GI doctor.\par She is currently on Pantoprazole.\par HTN on Valsartan and Amlodipine, states BP fluctuates.\par \par

## 2022-08-02 NOTE — COUNSELING
[Behavioral health counseling provided] : Behavioral health counseling provided [Sleep ___ hours/day] : Sleep [unfilled] hours/day [Engage in a relaxing activity] : Engage in a relaxing activity [Plan in advance] : Plan in advance [Potential consequences of obesity discussed] : Potential consequences of obesity discussed [Benefits of weight loss discussed] : Benefits of weight loss discussed [Encouraged to maintain food diary] : Encouraged to maintain food diary [Encouraged to increase physical activity] : Encouraged to increase physical activity [Encouraged to use exercise tracking device] : Encouraged to use exercise tracking device [Weigh Self Weekly] : weigh self weekly [Decrease Portions] : decrease portions [____ min/wk Activity] : [unfilled] min/wk activity [Keep Food Diary] : keep food diary [Needs reinforcement, provided] : Patient needs reinforcement on understanding of disease, goals and obesity follow-up plan; reinforcement was provided

## 2022-08-02 NOTE — ASSESSMENT
[FreeTextEntry1] : HTN\par On valsartan and amlodipine blood pressure is okay today \par weight loss low-salt diet recommended\par \par Obesity\par Has been working on making lifestyle changes.  Has lost 18 pounds since her last visit.\par \par Anxiety with Depression\par Start desvenlafaxine 25 mg\par Discussed side effects.\par Follow-up in 1 month.

## 2022-08-02 NOTE — REVIEW OF SYSTEMS
[Fatigue] : fatigue [Chest Pain] : chest pain [Palpitations] : palpitations [Negative] : Heme/Lymph [Leg Claudication] : no leg claudication [Lower Ext Edema] : no lower extremity edema [Orthopnea] : no orthopnea [Paroxysmal Nocturnal Dyspnea] : no paroxysmal nocturnal dyspnea [FreeTextEntry9] : see hpi

## 2022-08-02 NOTE — PHYSICAL EXAM
[Normal Sclera/Conjunctiva] : normal sclera/conjunctiva [Supple] : supple [No Edema] : there was no peripheral edema [No Extremity Clubbing/Cyanosis] : no extremity clubbing/cyanosis [Normal Anterior Cervical Nodes] : no anterior cervical lymphadenopathy [No Joint Swelling] : no joint swelling [Coordination Grossly Intact] : coordination grossly intact [Normal Gait] : normal gait [Normal] : affect was normal and insight and judgment were intact [de-identified] : varicose veins b/l

## 2022-08-04 ENCOUNTER — TRANSCRIPTION ENCOUNTER (OUTPATIENT)
Age: 62
End: 2022-08-04

## 2022-08-04 LAB
ALBUMIN SERPL ELPH-MCNC: 4.5 G/DL
ALP BLD-CCNC: 81 U/L
ALT SERPL-CCNC: 15 U/L
ANION GAP SERPL CALC-SCNC: 11 MMOL/L
APPEARANCE: CLEAR
AST SERPL-CCNC: 13 U/L
BASOPHILS # BLD AUTO: 0.02 K/UL
BASOPHILS NFR BLD AUTO: 0.3 %
BILIRUB SERPL-MCNC: 0.3 MG/DL
BILIRUBIN URINE: NEGATIVE
BLOOD URINE: NEGATIVE
BUN SERPL-MCNC: 17 MG/DL
CALCIUM SERPL-MCNC: 9.5 MG/DL
CHLORIDE SERPL-SCNC: 107 MMOL/L
CHOLEST SERPL-MCNC: 184 MG/DL
CO2 SERPL-SCNC: 24 MMOL/L
COLOR: YELLOW
CREAT SERPL-MCNC: 0.58 MG/DL
EGFR: 102 ML/MIN/1.73M2
EOSINOPHIL # BLD AUTO: 0.05 K/UL
EOSINOPHIL NFR BLD AUTO: 0.7 %
ESTIMATED AVERAGE GLUCOSE: 123 MG/DL
GLUCOSE QUALITATIVE U: NEGATIVE
GLUCOSE SERPL-MCNC: 113 MG/DL
HBA1C MFR BLD HPLC: 5.9 %
HCT VFR BLD CALC: 39.6 %
HDLC SERPL-MCNC: 68 MG/DL
HGB BLD-MCNC: 12.3 G/DL
IMM GRANULOCYTES NFR BLD AUTO: 0.4 %
KETONES URINE: NEGATIVE
LDLC SERPL CALC-MCNC: 106 MG/DL
LEUKOCYTE ESTERASE URINE: NEGATIVE
LYMPHOCYTES # BLD AUTO: 1.83 K/UL
LYMPHOCYTES NFR BLD AUTO: 25.8 %
MAN DIFF?: NORMAL
MCHC RBC-ENTMCNC: 27.9 PG
MCHC RBC-ENTMCNC: 31.1 GM/DL
MCV RBC AUTO: 89.8 FL
MONOCYTES # BLD AUTO: 0.48 K/UL
MONOCYTES NFR BLD AUTO: 6.8 %
NEUTROPHILS # BLD AUTO: 4.69 K/UL
NEUTROPHILS NFR BLD AUTO: 66 %
NITRITE URINE: NEGATIVE
NONHDLC SERPL-MCNC: 116 MG/DL
PH URINE: 6.5
PLATELET # BLD AUTO: 227 K/UL
POTASSIUM SERPL-SCNC: 5 MMOL/L
PROT SERPL-MCNC: 6.9 G/DL
PROTEIN URINE: NORMAL
RBC # BLD: 4.41 M/UL
RBC # FLD: 14.6 %
SODIUM SERPL-SCNC: 141 MMOL/L
SPECIFIC GRAVITY URINE: 1.03
TRIGL SERPL-MCNC: 50 MG/DL
TSH SERPL-ACNC: 1.11 UIU/ML
URATE SERPL-MCNC: 3.7 MG/DL
UROBILINOGEN URINE: NORMAL
WBC # FLD AUTO: 7.1 K/UL

## 2022-08-11 NOTE — ASU PATIENT PROFILE, ADULT - ABLE TO REACH PT
Deandra Art discharged to facility accompanied by other wheelchair tiana muñiz.   Valuables and belongings sent with patient.   RN to RN report called to [facility]. RN verbalized understanding.   [phone]

## 2022-08-13 ENCOUNTER — NON-APPOINTMENT (OUTPATIENT)
Age: 62
End: 2022-08-13

## 2022-08-16 ENCOUNTER — APPOINTMENT (OUTPATIENT)
Dept: FAMILY MEDICINE | Facility: CLINIC | Age: 62
End: 2022-08-16

## 2022-08-17 ENCOUNTER — APPOINTMENT (OUTPATIENT)
Dept: ORTHOPEDIC SURGERY | Facility: CLINIC | Age: 62
End: 2022-08-17

## 2022-08-17 PROCEDURE — 99024 POSTOP FOLLOW-UP VISIT: CPT

## 2022-08-17 RX ORDER — DESVENLAFAXINE 25 MG/1
25 TABLET, EXTENDED RELEASE ORAL
Qty: 90 | Refills: 0 | Status: DISCONTINUED | COMMUNITY
Start: 2022-08-02 | End: 2022-08-17

## 2022-08-22 NOTE — DISCUSSION/SUMMARY
[de-identified] : Patient was seen today for continued management of chronic knee pain secondary to underlying osteoarthritis. This has been a chronic issue for the patient with recent atraumatic exacerbation. We discussed various treatment options as well as associated risk/benefits/alternatives and patient elected to proceed with hyaluronic acid injection therapy. \par The first of three Visco 3 injections were given today under sterile conditions into the Bilateral knees without complication (see procedure note). The patient was instructed on modification of activities over the next 48-72 hours. I advised the patient to ice the knee as needed for control of local irritation from the injection. I advised that some patients have immediate benefit from the initial injection therapy, however it usually takes the medication a number of weeks (~8wks) to provide significant relief of osteoarthritic symptoms. \par \par I  will see the patient back for the second injection in approximately 1 week.\par \par I work as part of an academic orthopedic group and routinely have a physician in training (resident / fellow) working with me.  Any part of the history and physical exam performed by the physician in training was either directly reviewed and/or replicated by myself.  Any procedure performed by the physician in training was performed under my direct supervision and with the consent of the patient.\par \par This note was generated using dragon medical dictation software. A reasonable effort has been made for proofreading its contents, but typos may still remain. If there are any questions or points of clarification needed please notify my office.

## 2022-08-22 NOTE — PROCEDURE
[de-identified] : Injection: Right  knee joint.\par Indication: Osteoarthritis.\par \par A discussion was had with the patient regarding this procedure and all questions were answered. All risks, benefits and alternatives were discussed. These included but were not limited to bleeding, infection, and allergic reaction. A timeout was done to ensure correct side and patient agreed to the procedure.  A [place] jennifer was created on the skin utilizing a plastic needle cap to jennifer the anticipated point of entry. Alcohol was used to clean the skin, and betadine was used to sterilize and prep the area in the lateral joint line aspect of the knee. Ethyl chloride spray was then used as a topical anesthetic. A 22-gauge needle was used to inject Visco 3 into the knee with ease. A sterile bandage was then applied. The patient tolerated the procedure well and there were no complications. \par \par Lot #:  [id number]\par Exp: 3/31/23\par  \par ___________\par Injection: Left  knee joint.\par Indication: Osteoarthritis.\par \par A discussion was had with the patient regarding this procedure and all questions were answered. All risks, benefits and alternatives were discussed. These included but were not limited to bleeding, infection, and allergic reaction. A timeout was done to ensure correct side and patient agreed to the procedure.  A [place] jennifer was created on the skin utilizing a plastic needle cap to jennifer the anticipated point of entry. Alcohol was used to clean the skin, and betadine was used to sterilize and prep the area in the lateral joint line aspect of the knee. Ethyl chloride spray was then used as a topical anesthetic. A 22-gauge needle was used to inject Visco 3 into the knee with ease. A sterile bandage was then applied. The patient tolerated the procedure well and there were no complications. \par \par Lot #:  same as above\par Exp:  same as above\par

## 2022-08-22 NOTE — PHYSICAL EXAM
[de-identified] : Constitutional: Well-nourished, well-developed, No acute distress\par Respiratory: Good respiratory effort, no SOB\par Psychiatric: Pleasant and normal affect, alert and oriented x3\par Musculoskeletal: normal except where as noted in regional exam\par \par Bilateral Knees:\par APPEARANCE: + enlargement of the distal femur and proximal tibia, no deformity, no swelling, + varicose veins of b/l LE\par POSITIVE TENDERNESS: Distal femur, proximal tibia, medial jt line/retinaculum, and lateral jt line/retinaculum\par NONTENDER: patellar & quadriceps tendons, MCL/LCL, ITB at the lateral femoral condyle & Gerdy's tubercle, pes bursa. \par ROM: full extension, limited flexion of the left knee to 120° due to stiffness and pain. Full ROM of the right knee \par RESISTIVE TESTING: painless resisted knee flex/ext, although + crepitus felt in anterior knee. \par SPECIAL TESTS: stable v/v stress. painless grind. neg ant/post drawer. + Jozef's for pain in medial and lateral joint line. \par

## 2022-08-22 NOTE — HISTORY OF PRESENT ILLNESS
[de-identified] : Patient has a history of knee osteoarthritis.  We discussed treatment options and have obtained insurance authorization to proceed with a series of hyaluronic acid injections and patient would like to proceed at this time.  No significant interval change in knee pain since last evaluated.

## 2022-08-24 ENCOUNTER — APPOINTMENT (OUTPATIENT)
Dept: ORTHOPEDIC SURGERY | Facility: CLINIC | Age: 62
End: 2022-08-24

## 2022-08-24 DIAGNOSIS — M17.0 BILATERAL PRIMARY OSTEOARTHRITIS OF KNEE: ICD-10-CM

## 2022-08-24 PROCEDURE — 99213 OFFICE O/P EST LOW 20 MIN: CPT | Mod: 25

## 2022-08-24 NOTE — PROCEDURE
[de-identified] : Injection: Right  knee joint.\par Indication: Osteoarthritis.\par \par A discussion was had with the patient regarding this procedure and all questions were answered. All risks, benefits and alternatives were discussed. These included but were not limited to bleeding, infection, and allergic reaction. A timeout was done to ensure correct side and patient agreed to the procedure.  A [place] jennifer was created on the skin utilizing a plastic needle cap to jennifer the anticipated point of entry. Alcohol was used to clean the skin, and betadine was used to sterilize and prep the area in the lateral joint line aspect of the knee. Ethyl chloride spray was then used as a topical anesthetic. A 22-gauge needle was used to inject 4cc of Visco 3 into the knee with ease. A sterile bandage was then applied. The patient tolerated the procedure well and there were no complications. \par \par Lot #:  [id number]\par Exp: 3/31/23\par  \par ___________\par Injection: Left  knee joint.\par Indication: Osteoarthritis.\par \par A discussion was had with the patient regarding this procedure and all questions were answered. All risks, benefits and alternatives were discussed. These included but were not limited to bleeding, infection, and allergic reaction. A timeout was done to ensure correct side and patient agreed to the procedure.  A [place] jennifer was created on the skin utilizing a plastic needle cap to jennifer the anticipated point of entry. Alcohol was used to clean the skin, and betadine was used to sterilize and prep the area in the lateral joint line aspect of the knee. Ethyl chloride spray was then used as a topical anesthetic. A 22-gauge needle was used to inject 4cc of Visco 3 into the knee with ease. A sterile bandage was then applied. The patient tolerated the procedure well and there were no complications. \par \par Lot #:  same as above\par Exp:  same as above\par

## 2022-08-24 NOTE — DISCUSSION/SUMMARY
[de-identified] : The second and third visco-3 injections were given today under sterile conditions into the bilateral knee joints without complication (see procedure note). I discussed the effects of this medication and how long it may provide benefit. Patient has obtained moderate immediate improvement. If no significant long-term benefit, the patient may elect for additional treatment strategies as previously discussed. However, if the patient obtains good relief of symptoms, the injection therapy series can be repeated over the next 6-12 months.\par \par Will have the patient followup on an as-needed basis at this time.  Patient appreciates and agrees with current plan.\par \par This note was generated using dragon medical dictation software.  A reasonable effort has been made for proofreading its contents, but typos may still remain.  If there are any questions or points of clarification needed please notify my office.\par

## 2022-08-24 NOTE — HISTORY OF PRESENT ILLNESS
[de-identified] : Patient is here today to follow-up on knee pain.  There has been some mild improvement after the first hyaluronic acid injection performed at last visit.  No adverse reaction thus far.  Patient would like to proceed with the second injection in the series at this time.

## 2022-08-24 NOTE — PHYSICAL EXAM
[de-identified] : Constitutional: Well-nourished, well-developed, No acute distress\par Respiratory: Good respiratory effort, no SOB\par Psychiatric: Pleasant and normal affect, alert and oriented x3\par Musculoskeletal: normal except where as noted in regional exam\par \par Bilateral Knees:\par APPEARANCE: + enlargement of the distal femur and proximal tibia, no deformity, no swelling, + varicose veins of b/l LE\par POSITIVE TENDERNESS: Distal femur, proximal tibia, medial jt line/retinaculum, and lateral jt line/retinaculum\par NONTENDER: patellar & quadriceps tendons, MCL/LCL, ITB at the lateral femoral condyle & Gerdy's tubercle, pes bursa. \par ROM: full extension, limited flexion of the left knee to 120° due to stiffness and pain. Full ROM of the right knee \par RESISTIVE TESTING: painless resisted knee flex/ext, although + crepitus felt in anterior knee. \par SPECIAL TESTS: stable v/v stress. painless grind. neg ant/post drawer. + Jozef's for pain in medial and lateral joint line. \par \par \par

## 2022-09-01 ENCOUNTER — APPOINTMENT (OUTPATIENT)
Dept: FAMILY MEDICINE | Facility: CLINIC | Age: 62
End: 2022-09-01

## 2022-10-07 ENCOUNTER — APPOINTMENT (OUTPATIENT)
Dept: ORTHOPEDIC SURGERY | Facility: CLINIC | Age: 62
End: 2022-10-07

## 2022-10-07 ENCOUNTER — NON-APPOINTMENT (OUTPATIENT)
Age: 62
End: 2022-10-07

## 2022-10-07 VITALS
SYSTOLIC BLOOD PRESSURE: 168 MMHG | BODY MASS INDEX: 29.71 KG/M2 | WEIGHT: 174 LBS | HEIGHT: 64 IN | DIASTOLIC BLOOD PRESSURE: 82 MMHG | HEART RATE: 76 BPM

## 2022-10-07 PROCEDURE — 99214 OFFICE O/P EST MOD 30 MIN: CPT

## 2022-10-07 PROCEDURE — 73564 X-RAY EXAM KNEE 4 OR MORE: CPT | Mod: LT

## 2022-10-23 NOTE — ED ADULT TRIAGE NOTE - BP NONINVASIVE DIASTOLIC (MM HG)
.  Reason for Consult: Shortness of breath    HPI:  Alli Montero is a 72 year old male with history of HTN, PAF, schizophrenia, seizure disorder, presented from a nursing home with hypoxia.  He was COVID-positive over a week ago.  Admitted to regular floor, started on IV antibiotics  Patient denies any fevers or chills, no nausea vomiting diarrhea, no chest pain  On my examination he appears comfortable, but does have productive cough     Past Medical History:  Past Medical History:   Diagnosis Date   • Essential (primary) hypertension    • Hyperlipoproteinemia    • PAF (paroxysmal atrial fibrillation) (CMS/HCC)    • Parkinson disease (CMS/HCC)    • Schizophrenia (CMS/HCC)    • Seizure (CMS/HCC)    • Thyroid disease        Social History:  Social History     Socioeconomic History   • Marital status: /Civil Union     Spouse name: Not on file   • Number of children: Not on file   • Years of education: Not on file   • Highest education level: Not on file   Occupational History   • Not on file   Tobacco Use   • Smoking status: Never Smoker   • Smokeless tobacco: Never Used   Vaping Use   • Vaping Use: never used   Substance and Sexual Activity   • Alcohol use: Not Currently   • Drug use: Not Currently   • Sexual activity: Not on file   Other Topics Concern   • Not on file   Social History Narrative   • Not on file     Social Determinants of Health     Financial Resource Strain: Not on file   Food Insecurity: Not on file   Transportation Needs: Not on file   Physical Activity: Not on file   Stress: Not on file   Social Connections: Not on file   Intimate Partner Violence: Not At Risk   • Social Determinants: Intimate Partner Violence Past Fear: No   • Social Determinants: Intimate Partner Violence Current Fear: No       Family History:  No family history on file.    Surgical History:  Past Surgical History:   Procedure Laterality Date   • Gastrostomy tube placement         Review of Systems:  Eye  Problem(s):negative  ENT Problem(s):negative  Cardiovascular problem(s):negative  Respiratory problem(s):cough  Gastro-intestinal problem(s):negative GI  Genito-urinary problem(s):negative  Musculoskeletal problem(s):negative  Integumentary problem(s):negative  Neurological problem(s):negative  Psychiatric problem(s):negative  Endocrine problem(s):negative  Hematologic and/or Lymphatic problem(s):negative     Home Meds:  Medications Prior to Admission   Medication Sig Dispense Refill   • Pramoxine HCl (Sarna Sensitive) 1 % Lotion Apply 1 application topically daily. Apply to affected areas for itchy skin     • valproic acid (DEPAKENE) 250 MG/5ML solution 500 mg every 8 hours. Via PEG tube     • guaiFENesin-DM (ROBITUSSIN DM) 100-10 MG/5ML syrup 10 mLs by PEG Tube route 4 times daily.     • docusate sodium-sennosides (SENOKOT S) 50-8.6 MG per tablet 2 tablets by PEG Tube route nightly.     • lisinopril (ZESTRIL) 5 MG tablet 5 mg by PEG Tube route daily.     • acetaminophen (TYLENOL) 325 MG tablet 650 mg by PEG Tube route every 6 hours as needed for Pain or Fever (over 100 degrees F). Do not exceed 3 grams in 24 hours     • atropine (ISOPTO ATROPINE) 1 % ophthalmic solution Place 2 drops under the tongue every 2 hours as needed (for secretions). 15 mL 0   • ipratropium-albuterol (DUONEB) 0.5-2.5 (3) MG/3ML nebulizer solution Take 3 mLs by nebulization every 4 hours as needed for Wheezing or Shortness of Breath.     • levothyroxine 125 MCG tablet 125 mcg by PEG Tube route daily (before breakfast).     • QUEtiapine (SEROquel) 200 MG tablet 200 mg by PEG Tube route at bedtime. Give with a 50 mg tablet for a total dose of 250 mg     • QUEtiapine (SEROquel) 50 MG tablet 50 mg by PEG Tube route at bedtime. Give with a 200 mg tablet for a total dose of 250 mg     • QUEtiapine (SEROquel) 100 MG tablet 100 mg by PEG Tube route daily.     • sodium chloride 1 g tablet 1 g by PEG Tube route 2 times daily.     •  budesonide-formoterol (SYMBICORT) 80-4.5 MCG/ACT inhaler Inhale 2 puffs into the lungs 2 times daily.     • carbidopa-levodopa (SINEMET)  MG per tablet 1 tablet by PEG Tube route every 12 hours.     • loratadine (CLARITIN) 10 MG tablet 10 mg by PEG Tube route daily.     • atorvastatin (LIPITOR) 40 MG tablet 40 mg at bedtime. Per PEG-Tube     • magnesium hydroxide (MILK OF MAGNESIA) 400 MG/5ML suspension 10 mLs nightly. Per PEG tube     • Cholecalciferol (VITAMIN D) 50 mcg (2,000 units) tablet 50 mcg daily. Per PEG tube     • apixaBAN (ELIQUIS) 5 MG Tab 5 mg by PEG Tube route every 12 hours.         Scheduled Meds:   Current Facility-Administered Medications   Medication Dose Route Frequency Provider Last Rate Last Admin   • sodium chloride (PF) 0.9 % injection 2 mL  2 mL Intracatheter 2 times per day Jeremi Santiago CNP       • VANCOMYCIN - PHARMACIST MONITORED Misc   Does not apply See Admin Instructions Jeremi Santiago CNP       • piperacillin-tazobactam (ZOSYN) 3.375 g in sodium chloride 0.9 % 100 mL IVPB  3.375 g Intravenous 3 times per day Jeremi Santiago CNP       • ipratropium-albuterol (DUONEB) 0.5-2.5 (3) MG/3ML nebulizer solution 3 mL  3 mL Nebulization 4x Daily Resp Jeremi Santiago CNP       • [START ON 10/23/2022] methylPREDNISolone (SOLU-Medrol) PF injection 40 mg  40 mg Intravenous 3 times per day Jeremi Santiago CNP       • [START ON 10/23/2022] vancomycin 1,250 mg in sodium chloride 0.9% 250 mL IVPB  1,250 mg Intravenous Daily Jeremi Santiago CNP       • apixaBAN (ELIQUIS) tablet 5 mg  5 mg PEG Tube 2 times per day Jeremi Santiago CNP       • atorvastatin (LIPITOR) tablet 40 mg  40 mg PEG Tube Nightly Jeremi Santiago CNP       • budesonide-formoterol (SYMBICORT) 80-4.5 MCG/ACT inhaler 2 puff  2 puff Inhalation BID Jeremi Santiago CNP       • carbidopa-levodopa (SINEMET)  MG per tablet 1 tablet  1 tablet PEG Tube Q12H Jeremi Santiago, BORA   1 tablet at 10/22/22 1807   • guaiFENesin-DM (ROBITUSSIN DM)  100-10 MG/5ML syrup 10 mL  10 mL PEG Tube 4x Daily Jeremi Santiago CNP   10 mL at 10/22/22 1807   • docusate sodium-sennosides (SENOKOT S) 50-8.6 MG 2 tablet  2 tablet PEG Tube Nightly Jeremi Satniago CNP       • [START ON 10/23/2022] levothyroxine (SYNTHROID, LEVOTHROID) tablet 125 mcg  125 mcg PEG Tube QAM AC Jeremi Santiago CNP       • [Held by provider] QUEtiapine (SEROquel) tablet 200 mg  200 mg PEG Tube QHS Jeremi Santiago CNP       • [Held by provider] QUEtiapine (SEROquel) tablet 50 mg  50 mg PEG Tube QHS Jeremi Santiago CNP       • [Held by provider] QUEtiapine (SEROquel) tablet 100 mg  100 mg PEG Tube Daily Jeremi Santiago CNP       • sodium chloride tablet 1,000 mg  1 g PEG Tube BID Jeremi Santiago CNP       • valproic acid (DEPAKENE) solution 500 mg  500 mg PEG Tube 3 times per day Jeremi Santiago CNP       • [Held by provider] lisinopril (ZESTRIL) tablet 5 mg  5 mg PEG Tube Daily Jeremi Santiago CNP       • insulin lispro (ADMELOG,HumaLOG) - Correction Dose   Subcutaneous 4 times per day Jeremi Santiago CNP       • acetylcysteine (MUCOMYST) 20 % nebulizer solution 600 mg  600 mg Nebulization Q6H Resp Babak Alvarado MD       • scopolamine (TRANSDERM_SCOP) 1 MG/3DAYS patch 1 patch  1 patch Transdermal Q3 Days Babak Alvarado MD           Continuous Infusions:   Current Facility-Administered Medications   Medication Dose Route Frequency Provider Last Rate Last Admin       PRN Meds:   Current Facility-Administered Medications   Medication Dose Route Frequency Provider Last Rate Last Admin   • sodium chloride 0.9 % flush bag 25 mL  25 mL Intravenous PRN Jeremi Santiago CNP       • sodium chloride (NORMAL SALINE) 0.9 % bolus 500 mL  500 mL Intravenous PRN Jeremi Santiago CNP       • acetaminophen (TYLENOL) tablet 650 mg  650 mg PEG Tube Q6H PRN Jeremi Santiago CNP       • ipratropium-albuterol (DUONEB) 0.5-2.5 (3) MG/3ML nebulizer solution 3 mL  3 mL Nebulization Q4H PRN Jeremi Santiago, BORA       • metoPROLOL (LOPRESSOR)  injection 5 mg  5 mg Intravenous Q6H PRN Trushar Santiago, CNP       • dextrose 50 % injection 25 g  25 g Intravenous PRN Trushar Santiago, CNP       • dextrose 50 % injection 12.5 g  12.5 g Intravenous PRN Trushar Santiago, CNP       • glucagon (GLUCAGEN) injection 1 mg  1 mg Intramuscular PRN Trushar Santiago, CNP       • dextrose (GLUTOSE) 40 % gel 15 g  15 g Oral PRN Trushar Santiago, CNP       • dextrose (GLUTOSE) 40 % gel 30 g  30 g Oral PRN Trushar Santiago, CNP           Vital signs :  Vitals:    10/22/22 0955 10/22/22 1001 10/22/22 1435 10/22/22 1900   BP: 96/65  (!) 150/81    BP Location:       Patient Position:       Pulse: 89 92 92    Resp: (!) 23 (!) 26 20    Temp:   98.2 °F (36.8 °C)    TempSrc:   Axillary    SpO2: 100% 97% 96%    Weight:    84.6 kg (186 lb 8.2 oz)   Height:    5' 10\" (1.778 m)        Intake/Output last 3 shifts:  I/O last 3 completed shifts:  In: 1250 [IV Piggyback:1250]  Out: -   Intake/Output this shift:  No intake/output data recorded.    Vent settings for last 24 hours:       Physical Exam:  General appearance: Elderly male, ill-appearing  Lungs: Very coarse bilateral breath sounds, nonlabored respirations  Heart: Regular rate and rhythm  Abdomen: Soft no distention  Extremities: No edema  Skin: Dry and warm  Neuro: Awake, responsive, follows commands    Labs  Recent Labs   Lab 10/22/22  1042   SODIUM 132*  133*   POTASSIUM 4.5  4.5   CHLORIDE 100  99   CO2 26  28   BUN 14  14   CREATININE 0.78  0.68   GLUCOSE 82  83   CALCIUM 8.9  9.2      Recent Labs   Lab 10/22/22  1042   SODIUM 132*  133*   CO2 26  28   BUN 14  14   CREATININE 0.78  0.68   CALCIUM 8.9  9.2   ALBUMIN 2.7*  2.7*   BILIRUBIN 0.6  0.6   ALKPT 69  69   GPT 24  20   AST 19  18   GLUCOSE 82  83      Recent Labs   Lab 10/22/22  1042   WBC 8.9   RBC 3.88*   HGB 14.0   HCT 40.5             CT HEAD WO CONTRAST    Result Date: 10/22/2022  EXAM: CT HEAD WO CONTRAST CLINICAL INDICATION: Mental status change, unknown  cause COMPARISON: 09/18/2020 TECHNIQUE: Axial images were obtained from skull base to vertex without IV contrast. Automated dose lowering techniques were utilized. FINDINGS: Moderate cerebral and cerebellar cortical atrophy. Small vessel chronic ischemic changes are present involving the periventricular white matter and centrum semiovale. No acute intracranial hemorrhage, mass effect or midline shift. Visualized portions of the paranasal sinuses and mastoid air cells are well aerated.  Chronic left nasal bone fracture.  Of the maxilla left of the midline representing periodontal abscess.     1. No acute intracranial abnormality. No bleed or shift. 2. Chronic small vessel ischemic changes. 3.  Possible periodontal abscess of the maxilla. Electronically Signed by: ARLENE MORA M.D. Signed on: 10/22/2022 10:35 AM     XR CHEST PA OR AP 1 VIEW    Result Date: 10/22/2022  EXAM: XR CHEST PA OR AP 1 VIEW CLINICAL INDICATION: cough COMPARISON: 09/23/2022 FINDINGS: Study is limited due to positioning.  There is enlarged heart with normal caliber pulmonary vascularity.  Opacity in the left lower lobe retrocardiac region may represent atelectasis although underlying pneumonia not excluded.  Post surgical changes of the right humerus.     1.  Limited study.  Cardiomegaly noted. 2.  Left basilar opacity.  Atelectasis versus pneumonia not excluded. Follow-up recommended. Electronically Signed by: ARLENE MORA M.D. Signed on: 10/22/2022 9:42 AM       ASSESSMENT/PLAN:    Acute hypoxemic respiratory failure due to aspiration pneumonia  Will start scopolamine patch  20% nebulized Mucomyst every 6 hours ordered  Continue IV antibiotics, Zosyn IV   Speech eval ordered  Continue supplemental O2, maintain saturation above 94%  Discussed with nursing staff, discussed with hospitalist services    CODE STATUS: Full code    I performed the patients examination, review of records, monitored VS, reviewed laboratory and radiographic data and  formulated plans for the continued care of this patient. I have outlined the management plan for today and was available during the day to assist with patient management.    Babak Alvarado MD    Speech recognition software was used in the preparation of this note, errors in transcription may be present. Please call/page if there are questions.     Note to patient: The 21st Century Cures Act makes medical notes like these available to patients in the interest of transparency. However, be advised this is a medical document. It is intended as peer to peer communication. It is written in medical language and may contain abbreviations or verbiage that are unfamiliar. It may appear blunt or direct. Medical documents are intended to carry relevant information, facts as evident, and the clinical opinion of the practitioner.     81

## 2022-11-04 ENCOUNTER — NON-APPOINTMENT (OUTPATIENT)
Age: 62
End: 2022-11-04

## 2022-11-04 ENCOUNTER — APPOINTMENT (OUTPATIENT)
Dept: CARDIOLOGY | Facility: CLINIC | Age: 62
End: 2022-11-04

## 2022-11-04 VITALS
HEIGHT: 64 IN | OXYGEN SATURATION: 100 % | RESPIRATION RATE: 16 BRPM | SYSTOLIC BLOOD PRESSURE: 132 MMHG | DIASTOLIC BLOOD PRESSURE: 78 MMHG | BODY MASS INDEX: 29.71 KG/M2 | HEART RATE: 75 BPM | WEIGHT: 174 LBS

## 2022-11-04 VITALS
DIASTOLIC BLOOD PRESSURE: 84 MMHG | WEIGHT: 174 LBS | SYSTOLIC BLOOD PRESSURE: 150 MMHG | HEART RATE: 75 BPM | OXYGEN SATURATION: 100 % | HEIGHT: 64 IN | BODY MASS INDEX: 29.71 KG/M2

## 2022-11-04 DIAGNOSIS — R00.2 PALPITATIONS: ICD-10-CM

## 2022-11-04 DIAGNOSIS — R94.31 ABNORMAL ELECTROCARDIOGRAM [ECG] [EKG]: ICD-10-CM

## 2022-11-04 DIAGNOSIS — Z01.810 ENCOUNTER FOR PREPROCEDURAL CARDIOVASCULAR EXAMINATION: ICD-10-CM

## 2022-11-04 PROCEDURE — 93000 ELECTROCARDIOGRAM COMPLETE: CPT | Mod: NC

## 2022-11-04 PROCEDURE — 99214 OFFICE O/P EST MOD 30 MIN: CPT | Mod: 25

## 2022-11-04 NOTE — PHYSICAL EXAM
[Obese] : obese [Normal Conjunctiva] : normal conjunctiva [Normal Venous Pressure] : normal venous pressure [Normal S1, S2] : normal S1, S2 [No Rub] : no rub [No Gallop] : no gallop [Murmur] : murmur [Normal] : clear lung fields, good air entry, no respiratory distress [Soft] : abdomen soft [Non Tender] : non-tender [Normal Bowel Sounds] : normal bowel sounds [Normal Gait] : normal gait [No Edema] : no edema [No Cyanosis] : no cyanosis [No Varicosities] : no varicosities [Venous varicosities] : venous varicosities [No Rash] : no rash [No Skin Lesions] : no skin lesions [Moves all extremities] : moves all extremities [Alert and Oriented] : alert and oriented [de-identified] : 2/6 SM  [de-identified] : knee pain  [de-identified] : extensive LE varicosities

## 2022-11-04 NOTE — HISTORY OF PRESENT ILLNESS
[FreeTextEntry1] : 62 year old female with hx of hypertension obesity GERD hiatal hernia s/p covid jan 2022 came for pre op cardiac evaluation for left knee replacement scheduled in december 2022 . Patient is feeling well , decreased palpitations with diet restricted weight loss ,denies no chest pain , patient  had echo showed normal EF mild DD ,  normal chemical stress test . patient can not walk long due to knee pain \par \par Patient blood pressure is well controlled on current medications ,  patient blood work  normal TC   HB a1c 5.9 % \par \par patient does have extensive varicosities \par \par \par

## 2022-11-04 NOTE — ASSESSMENT
[FreeTextEntry1] : Patient with above hx  who is feeling much better , without active cardiac symptoms , optimal for proposed surgery , patient is low to intermediate risk for intermediate risk sugery \par \par who developed intermittent brief palpitation , atypical chest pain   ,: who had  negative troponin , CT angio for PE \par had normal chemical stress test , normal LVEF ,   patient did not have holter  her symptoms improved \par \par abnormal EKG  sinus rhythm left axis LAFB  her hx of sarcoidosis was ruled out   :  possible underlying hypertensive heart disease :  no evidence of ischemia on chemical stress test \par \par Hypertension  possible hypertensive heart disease  :  improved blood pressure on current adjusted medication doses , advised the patient to follow low salt diet  home BP check ,  will continue  norvasc to 10 mg po daily , valsartan 320 mg po daily, decrease use of ibuprofen  . continue medication post operatively with BP  parameters \par \par Fatigue  better after she was able to sleep well , with pain improvement  with intra articular injection of knees \par \par Cardiac murmur : possible aortic sclerosis , \par \par bilateral varicose veins right more than left   recommended vascular evaluation , \par \par  follow up after 6 weeks

## 2022-11-04 NOTE — CARDIOLOGY SUMMARY
[de-identified] : 11/4/22  sinus rhythm non specific mild IVCD normal variant  [de-identified] : 3/24/22 no ischemia on chemical stress test  [de-identified] : 2/18/22  EF 66%  mild TR

## 2022-11-23 ENCOUNTER — OUTPATIENT (OUTPATIENT)
Dept: OUTPATIENT SERVICES | Facility: HOSPITAL | Age: 62
LOS: 1 days | End: 2022-11-23
Payer: COMMERCIAL

## 2022-11-23 VITALS
HEIGHT: 64 IN | DIASTOLIC BLOOD PRESSURE: 79 MMHG | RESPIRATION RATE: 16 BRPM | OXYGEN SATURATION: 98 % | SYSTOLIC BLOOD PRESSURE: 137 MMHG | TEMPERATURE: 98 F | HEART RATE: 64 BPM | WEIGHT: 175.05 LBS

## 2022-11-23 DIAGNOSIS — F41.9 ANXIETY DISORDER, UNSPECIFIED: ICD-10-CM

## 2022-11-23 DIAGNOSIS — Z98.890 OTHER SPECIFIED POSTPROCEDURAL STATES: Chronic | ICD-10-CM

## 2022-11-23 DIAGNOSIS — I10 ESSENTIAL (PRIMARY) HYPERTENSION: ICD-10-CM

## 2022-11-23 DIAGNOSIS — M17.12 UNILATERAL PRIMARY OSTEOARTHRITIS, LEFT KNEE: ICD-10-CM

## 2022-11-23 DIAGNOSIS — Z01.818 ENCOUNTER FOR OTHER PREPROCEDURAL EXAMINATION: ICD-10-CM

## 2022-11-23 DIAGNOSIS — R58 HEMORRHAGE, NOT ELSEWHERE CLASSIFIED: Chronic | ICD-10-CM

## 2022-11-23 DIAGNOSIS — Z29.9 ENCOUNTER FOR PROPHYLACTIC MEASURES, UNSPECIFIED: ICD-10-CM

## 2022-11-23 LAB
A1C WITH ESTIMATED AVERAGE GLUCOSE RESULT: 5.9 % — HIGH (ref 4–5.6)
ANION GAP SERPL CALC-SCNC: 11 MMOL/L — SIGNIFICANT CHANGE UP (ref 5–17)
BLD GP AB SCN SERPL QL: NEGATIVE — SIGNIFICANT CHANGE UP
BUN SERPL-MCNC: 17 MG/DL — SIGNIFICANT CHANGE UP (ref 7–23)
CALCIUM SERPL-MCNC: 9.3 MG/DL — SIGNIFICANT CHANGE UP (ref 8.4–10.5)
CHLORIDE SERPL-SCNC: 106 MMOL/L — SIGNIFICANT CHANGE UP (ref 96–108)
CO2 SERPL-SCNC: 25 MMOL/L — SIGNIFICANT CHANGE UP (ref 22–31)
CREAT SERPL-MCNC: 0.5 MG/DL — SIGNIFICANT CHANGE UP (ref 0.5–1.3)
EGFR: 105 ML/MIN/1.73M2 — SIGNIFICANT CHANGE UP
ESTIMATED AVERAGE GLUCOSE: 123 MG/DL — HIGH (ref 68–114)
GLUCOSE SERPL-MCNC: 105 MG/DL — HIGH (ref 70–99)
HCT VFR BLD CALC: 38.2 % — SIGNIFICANT CHANGE UP (ref 34.5–45)
HGB BLD-MCNC: 11.9 G/DL — SIGNIFICANT CHANGE UP (ref 11.5–15.5)
MCHC RBC-ENTMCNC: 27.5 PG — SIGNIFICANT CHANGE UP (ref 27–34)
MCHC RBC-ENTMCNC: 31.2 GM/DL — LOW (ref 32–36)
MCV RBC AUTO: 88.2 FL — SIGNIFICANT CHANGE UP (ref 80–100)
MRSA PCR RESULT.: SIGNIFICANT CHANGE UP
NRBC # BLD: 0 /100 WBCS — SIGNIFICANT CHANGE UP (ref 0–0)
PLATELET # BLD AUTO: 242 K/UL — SIGNIFICANT CHANGE UP (ref 150–400)
POTASSIUM SERPL-MCNC: 4.4 MMOL/L — SIGNIFICANT CHANGE UP (ref 3.5–5.3)
POTASSIUM SERPL-SCNC: 4.4 MMOL/L — SIGNIFICANT CHANGE UP (ref 3.5–5.3)
RBC # BLD: 4.33 M/UL — SIGNIFICANT CHANGE UP (ref 3.8–5.2)
RBC # FLD: 13.9 % — SIGNIFICANT CHANGE UP (ref 10.3–14.5)
RH IG SCN BLD-IMP: POSITIVE — SIGNIFICANT CHANGE UP
S AUREUS DNA NOSE QL NAA+PROBE: SIGNIFICANT CHANGE UP
SODIUM SERPL-SCNC: 142 MMOL/L — SIGNIFICANT CHANGE UP (ref 135–145)
WBC # BLD: 5.68 K/UL — SIGNIFICANT CHANGE UP (ref 3.8–10.5)
WBC # FLD AUTO: 5.68 K/UL — SIGNIFICANT CHANGE UP (ref 3.8–10.5)

## 2022-11-23 PROCEDURE — 85027 COMPLETE CBC AUTOMATED: CPT

## 2022-11-23 PROCEDURE — 87641 MR-STAPH DNA AMP PROBE: CPT

## 2022-11-23 PROCEDURE — 86901 BLOOD TYPING SEROLOGIC RH(D): CPT

## 2022-11-23 PROCEDURE — 86900 BLOOD TYPING SEROLOGIC ABO: CPT

## 2022-11-23 PROCEDURE — 86850 RBC ANTIBODY SCREEN: CPT

## 2022-11-23 PROCEDURE — G0463: CPT

## 2022-11-23 PROCEDURE — G1004: CPT

## 2022-11-23 PROCEDURE — 73700 CT LOWER EXTREMITY W/O DYE: CPT | Mod: 26,LT,ME

## 2022-11-23 PROCEDURE — 87640 STAPH A DNA AMP PROBE: CPT

## 2022-11-23 PROCEDURE — 83036 HEMOGLOBIN GLYCOSYLATED A1C: CPT

## 2022-11-23 PROCEDURE — 73700 CT LOWER EXTREMITY W/O DYE: CPT | Mod: ME

## 2022-11-23 PROCEDURE — 80048 BASIC METABOLIC PNL TOTAL CA: CPT

## 2022-11-23 RX ORDER — SODIUM CHLORIDE 9 MG/ML
3 INJECTION INTRAMUSCULAR; INTRAVENOUS; SUBCUTANEOUS EVERY 8 HOURS
Refills: 0 | Status: DISCONTINUED | OUTPATIENT
Start: 2022-12-14 | End: 2022-12-28

## 2022-11-23 RX ORDER — B-COMPLEX WITH VITAMIN C
0 CAPSULE ORAL
Qty: 0 | Refills: 0 | DISCHARGE

## 2022-11-23 RX ORDER — AMLODIPINE BESYLATE 2.5 MG/1
1 TABLET ORAL
Qty: 0 | Refills: 0 | DISCHARGE

## 2022-11-23 RX ORDER — SODIUM CHLORIDE 9 MG/ML
1000 INJECTION, SOLUTION INTRAVENOUS
Refills: 0 | Status: DISCONTINUED | OUTPATIENT
Start: 2022-12-14 | End: 2022-12-28

## 2022-11-23 NOTE — H&P PST ADULT - HOW PATIENT ADDRESSED, PROFILE
Demetria
Scheduled for excision of right back mass 6/2/2022  Preoperative instructions discussed and given to patient.   Discussed preprocedure skin preparation using  chlorhexidine gluconate 4% solution three days prior to  surgery.  Instructed patient to avoid aspirin and aspirin products, over the counter medications such as vitamins and herbal medications, one week prior to surgery.  Take Tylenol as needed for pain  Complete covid 19 test 3 days before surgery  Patient verbalized understanding of instructions

## 2022-11-23 NOTE — H&P PST ADULT - PROBLEM SELECTOR PLAN 1
Left total knee arthroplasty w/Robotic assist with FILOMENA  Labs- CBC, BMP, HB A1C, MRSA/MSSA, T&S, CT knee  Pre op instructions discussed

## 2022-11-23 NOTE — H&P PST ADULT - ASSESSMENT
CAPRINI SCORE [CLOT updated 18]    AGE RELATED RISK FACTORS                                                       MOBILITY RELATED FACTORS  [ ] Age 41-60 years                                            (1 Point)                    [ ] Bed rest                                                        (1 Point)  [x ] Age: 61-74 years                                           (2 Points)                  [ ] Plaster cast                                                   (2 Points)  [ ] Age= 75 years                                              (3 Points)                    [ ] Bed bound for more than 72 hours                 (2 Points)    DISEASE RELATED RISK FACTORS                                               GENDER SPECIFIC FACTORS  [ ] Edema in the lower extremities                       (1 Point)              [ ] Pregnancy                                                     (1 Point)  [ ] Varicose veins                                               (1 Point)                     [ ] Post-partum < 6 weeks                                   (1 Point)             [ x] BMI > 25 Kg/m2                                            (1 Point)                     [ ] Hormonal therapy  or oral contraception          (1 Point)                 [ ] Sepsis (in the previous month)                        (1 Point)               [ ] History of pregnancy complications                 (1 point)  [ ] Pneumonia or serious lung disease                                               [ ] Unexplained or recurrent                     (1 Point)           (in the previous month)                               (1 Point)  [ ] Abnormal pulmonary function test                     (1 Point)                 SURGERY RELATED RISK FACTORS  [ ] Acute myocardial infarction                              (1 Point)               [ ]  Section                                             (1 Point)  [ ] Congestive heart failure (in the previous month)  (1 Point)      [ ] Minor surgery                                                  (1 Point)   [ ] Inflammatory bowel disease                             (1 Point)               [ ] Arthroscopic surgery                                        (2 Points)  [ ] Central venous access                                      (2 Points)                [ ] General surgery lasting more than 45 minutes (2 points)  [ ] Present or previous malignancy                     (2 Points)                [x ] Elective arthroplasty                                         (5 points)    [ ] Stroke (in the previous month)                          (5 Points)                                                                                                                                                           HEMATOLOGY RELATED FACTORS                                                 TRAUMA RELATED RISK FACTORS  [ ] Prior episodes of VTE                                     (3 Points)                [ ] Fracture of the hip, pelvis, or leg                       (5 Points)  [ ] Positive family history for VTE                         (3 Points)             [ ] Acute spinal cord injury (in the previous month)  (5 Points)  [ ] Prothrombin 86677 A                                     (3 Points)               [ ] Paralysis  (less than 1 month)                             (5 Points)  [ ] Factor V Leiden                                             (3 Points)                  [ ] Multiple Trauma within 1 month                        (5 Points)  [ ] Lupus anticoagulants                                     (3 Points)                                                           [ ] Anticardiolipin antibodies                               (3 Points)                                                       [ ] High homocysteine in the blood                      (3 Points)                                             [ ] Other congenital or acquired thrombophilia      (3 Points)                                                [ ] Heparin induced thrombocytopenia                  (3 Points)                                     Total Score [   8      ]

## 2022-11-23 NOTE — H&P PST ADULT - HISTORY OF PRESENT ILLNESS
63 yo female  with h/o HTN, GERD robles's esophagus , OA c/o left knee pain x 2 years, had no relief with conservative treatment. Pt had f/u orthopedic consult- X-ray/ MRI revealed osteoarthritis left knee. Pt scheduled for left total knee arthroplasty w3/ Robotic assist with FILOMENA on 12/4/22  **Pt denies any fever, chills, trauma, injury/fall or sick contacts  **Covid 19 PCR on 12/11/22 61 yo female  with h/o HTN, GERD robles's esophagus , OA c/o left knee pain x 2 years, had no relief with conservative treatment. Pt had f/u orthopedic consult- X-ray/ MRI revealed osteoarthritis left knee. Pt scheduled for left total knee arthroplasty w/ Robotic assist with FILOMENA on 12/4/22  **Pt denies any fever, chills, trauma, injury/fall or sick contacts  **Covid 19 PCR on 12/11/22

## 2022-11-23 NOTE — H&P PST ADULT - NSICDXPASTSURGICALHX_GEN_ALL_CORE_FT
PAST SURGICAL HISTORY:  Bleeding uterine ablation    H/O thyroid cyst s/p removal     History of Nissen fundoplication 10/2019    History of repair of hiatal hernia     S/P bilateral breast reduction     S/P  Section X2

## 2022-11-23 NOTE — H&P PST ADULT - NSICDXPASTMEDICALHX_GEN_ALL_CORE_FT
PAST MEDICAL HISTORY:  Franco's esophagus     COVID-19 virus detected 2020 with mild symptoms    Esophageal ulcer     GERD (gastroesophageal reflux disease)     H/O hiatal hernia     HTN (hypertension)     Miscarriage x 2    Moderate anxiety     OA (osteoarthritis)      PAST MEDICAL HISTORY:  Franco's esophagus     COVID-19 virus detected 2020 with mild symptoms    Esophageal ulcer     GERD (gastroesophageal reflux disease) Resolved    H/O hiatal hernia     HTN (hypertension)     Miscarriage x 2    Moderate anxiety     OA (osteoarthritis)

## 2022-11-27 ENCOUNTER — FORM ENCOUNTER (OUTPATIENT)
Age: 62
End: 2022-11-27

## 2022-11-30 NOTE — DISCUSSION/SUMMARY
[de-identified] : This patient has left knee osteoarthritis.  She has failed a course of conservative management and would like to proceed with a left total knee arthroplasty using robotic navigation for assistance.\par \par The patient is an appropriate candidate for consideration of left total knee replacement. An extensive discussion was conducted of the natural history of the disease and the variety of surgical and non-surgical treatment options available to the patient. A risk/benefit analysis was discussed with the patient reviewing the advantages and disadvantages of surgical intervention at this time. Both the level and length of the patient's pain have made additional conservative treatment measures consisting of NSAIDs, physical therapy, corticosteroids, and/or viscosupplementation contraindicated. A full explanation was given of the nature and the purpose of the procedure and anesthesia, its benefits, possible alternative methods of diagnosis or treatment, the risks involved, the possibility of complications, the foreseeable consequences of the procedure and the possible results of the non-treatment. I reviewed the plan of care as well as used a model of a total knee implant equivalent to the one that will be used for their total knee joint replacement. The ability to secure the implant utilizing cement or cementless (press-fit) was discussed with the patient. The patient agrees with the plan of care, as well as the use of implants for their total knee replacement.   We also discussed that if robotic/computer navigation is utilized, then additional incisions may need to be made to accommodate the computer navigation arrays, which will be placed in the femur and tibia.\par \par No guarantee or assurance was made as to the results that may be obtained. Specifically, the risks were identified to include, but are not limited to the following: Infection, phlebitis, pulmonary embolism, death, paralysis, dislocation, pain, stiffness, instability, limp, weakness, breakage, leg-length inequality, uncontrolled bleeding, nerve injury, blood vessel injury, pressure sores, anesthetic risks, delayed healing of wound and bone, and wear and loosening. Further discussion was undertaken with the patient about the details of surgical preparation, treatment, and postoperative rehabilitation including medical clearance, autotransfusion, the hospital course, and the postoperative rehabilitation involved. All in all, I feel that this patient is a good candidate for surgical reconstruction.\par \par The patient and I discussed the current SARS-CoV-2 (COVID-19) pandemic which has affected our local hospitals. We discussed that our hospitals treat patients with COVID-19. All efforts will be made to avoid cohorting the patient with diagnosed or suspected COVID-19 patient. They also understand that we will screen them 24-48 hours prior to surgery. Despite our best efforts, there is a potential risk for iatrogenic transmission of COVID-19 to the patient during the perioperative period. Sarahi COVID-19 during the perioperative period may increase the patient´s risks of an adverse outcome including postoperative pneumonia, difficulty breathing, requirement for a breathing tube (general endotracheal intubation), and death. The patient is understanding of this risk, and is willing to proceed with surgery at this time.\par \par The patient and I discussed the option for 23 hour stay joint replacement. They will stay overnight in the hospital after surgery and will discharge home on the next day of surgery. The risks and benefits of this type of rapid recovery protocol was reviewed with the patient and they are in agreement with proceeding with 23 hour stay joint replacement surgery. They understand that in the event of an emergency, that they will be transferred to the main hospital for inpatient care.

## 2022-11-30 NOTE — PHYSICAL EXAM
[de-identified] : Patient is well nourished, well-developed, in no acute distress, with appropriate mood and affect. The patient is oriented to time, place, and person. Respirations are even and unlabored. Gait evaluation does reveal a limp. There is no inguinal adenopathy. Examination of the contralateral knee shows normal range of motion, strength, no tenderness, and intact skin. The affected limb is well-perfused, without skin lesions, shows a grossly normal motor and sensory examination. Knee motion is significantly reduced and does cause significant pain. The knee moves from 0-110 degrees. The knee is stable within that range-of-motion to AP and ML stress. The alignment of the knee is 10 degrees valgus. Muscle strength is normal. Pedal pulses are palpable. Hip examination was negative.\par  [de-identified] : Long standing knee, AP knee, lateral knee, and patellar views of the left knee were ordered and taken in the office and demonstrate degenerative joint disease of the knee with joint space narrowing, osteophyte formation, and subchondral sclerosis.

## 2022-11-30 NOTE — ADDENDUM
[FreeTextEntry1] : Addendum 11/30/22: Long standing knee, AP knee, lateral knee, and patellar views of the left knee were ordered and taken in the office and demonstrate moderate-to-severe degenerative joint disease of the knee with joint space narrowing, osteophyte formation, and subchondral sclerosis.\par \par Jaspal Shelton M.D.\par Adult Joint Reconstruction Orthopedic Surgeon\par Department of Orthopaedic Surgery\par [facility]

## 2022-11-30 NOTE — HISTORY OF PRESENT ILLNESS
[de-identified] : This is very nice 62-year-old female experiencing left knee pain, which is severe in intensity.  She has been previously diagnosed by other physicians a left knee osteoarthritis.  The pain substantially limits activities of daily living. Walking tolerance is reduced. Medication including NSAIDs, intra-articular cortisone injections and hyaluronic acid injections and activity modification have been minimally effective for a period lasting greater than three months in duration. Assistive devices and external support were not deemed by the patient to be helpful in improving their function. Due to the severity of osteoarthritis and level of pain, physical therapy is contraindicated. Pain and restriction of function are intolerable at this time. The patient denies any radiation of the pain to the feet and it is not associated with numbness, tingling, or weakness.

## 2022-12-07 PROBLEM — F41.9 ANXIETY DISORDER, UNSPECIFIED: Chronic | Status: ACTIVE | Noted: 2022-11-23

## 2022-12-07 PROBLEM — U07.1 COVID-19: Chronic | Status: ACTIVE | Noted: 2022-11-23

## 2022-12-07 PROBLEM — M19.90 UNSPECIFIED OSTEOARTHRITIS, UNSPECIFIED SITE: Chronic | Status: ACTIVE | Noted: 2022-11-23

## 2022-12-08 ENCOUNTER — NON-APPOINTMENT (OUTPATIENT)
Age: 62
End: 2022-12-08

## 2022-12-08 ENCOUNTER — APPOINTMENT (OUTPATIENT)
Dept: FAMILY MEDICINE | Facility: CLINIC | Age: 62
End: 2022-12-08

## 2022-12-08 VITALS
DIASTOLIC BLOOD PRESSURE: 62 MMHG | BODY MASS INDEX: 29.9 KG/M2 | HEART RATE: 70 BPM | RESPIRATION RATE: 16 BRPM | OXYGEN SATURATION: 99 % | HEIGHT: 64 IN | WEIGHT: 175.13 LBS | SYSTOLIC BLOOD PRESSURE: 110 MMHG | TEMPERATURE: 97.6 F

## 2022-12-08 DIAGNOSIS — M17.12 UNILATERAL PRIMARY OSTEOARTHRITIS, LEFT KNEE: ICD-10-CM

## 2022-12-08 DIAGNOSIS — Z01.818 ENCOUNTER FOR OTHER PREPROCEDURAL EXAMINATION: ICD-10-CM

## 2022-12-08 PROCEDURE — 99214 OFFICE O/P EST MOD 30 MIN: CPT

## 2022-12-08 NOTE — REVIEW OF SYSTEMS
[Negative] : Heme/Lymph [Fatigue] : no fatigue [Chest Pain] : no chest pain [Palpitations] : no palpitations [Leg Claudication] : no leg claudication [Lower Ext Edema] : no lower extremity edema [Orthopnea] : no orthopnea [Paroxysmal Nocturnal Dyspnea] : no paroxysmal nocturnal dyspnea [FreeTextEntry9] : see hpi

## 2022-12-08 NOTE — HISTORY OF PRESENT ILLNESS
[No Pertinent Cardiac History] : no history of aortic stenosis, atrial fibrillation, coronary artery disease, recent myocardial infarction, or implantable device/pacemaker [No Pertinent Pulmonary History] : no history of asthma, COPD, sleep apnea, or smoking [No Adverse Anesthesia Reaction] : no adverse anesthesia reaction in self or family member [(Patient denies any chest pain, claudication, dyspnea on exertion, orthopnea, palpitations or syncope)] : Patient denies any chest pain, claudication, dyspnea on exertion, orthopnea, palpitations or syncope [Chronic Anticoagulation] : no chronic anticoagulation [Chronic Kidney Disease] : no chronic kidney disease [Diabetes] : no diabetes [FreeTextEntry1] : left knee replacement [FreeTextEntry2] : 12/14/2022 [FreeTextEntry3] : Dr Jaspal Shelton [FreeTextEntry4] : Ms. DAYSI LIU is a 62 year old female presenting for pre op evaluation.\par Knee pain for 3-4 years with Hx of severe OA scheduled for left knee replacement.\par

## 2022-12-08 NOTE — PHYSICAL EXAM
[Normal Sclera/Conjunctiva] : normal sclera/conjunctiva [Supple] : supple [No Edema] : there was no peripheral edema [No Extremity Clubbing/Cyanosis] : no extremity clubbing/cyanosis [Normal Anterior Cervical Nodes] : no anterior cervical lymphadenopathy [No Joint Swelling] : no joint swelling [Coordination Grossly Intact] : coordination grossly intact [Normal Gait] : normal gait [Normal] : affect was normal and insight and judgment were intact [de-identified] : varicose veins b/l

## 2022-12-08 NOTE — ASSESSMENT
[Patient Optimized for Surgery] : Patient optimized for surgery [No Further Testing Recommended] : no further testing recommended [Continue medications as is] : Continue current medications [As per surgery] : as per surgery [FreeTextEntry4] : Ms. DAYSI LIU is a 62 year old female presenting for pre op evaluation.\par Knee pain for 3-4 years with Hx of severe OA scheduled for left knee replacement.\par Cardiac clearance done by Dr Palla.\par She wants to increase venlafaxine dose. She states she continues to feel tired and low,  this may help.\par  [FreeTextEntry7] : hold otc vitamins for surgery

## 2022-12-11 ENCOUNTER — OUTPATIENT (OUTPATIENT)
Dept: OUTPATIENT SERVICES | Facility: HOSPITAL | Age: 62
LOS: 1 days | End: 2022-12-11
Payer: COMMERCIAL

## 2022-12-11 DIAGNOSIS — Z98.890 OTHER SPECIFIED POSTPROCEDURAL STATES: Chronic | ICD-10-CM

## 2022-12-11 DIAGNOSIS — Z11.52 ENCOUNTER FOR SCREENING FOR COVID-19: ICD-10-CM

## 2022-12-11 DIAGNOSIS — R58 HEMORRHAGE, NOT ELSEWHERE CLASSIFIED: Chronic | ICD-10-CM

## 2022-12-11 LAB — SARS-COV-2 RNA SPEC QL NAA+PROBE: SIGNIFICANT CHANGE UP

## 2022-12-11 PROCEDURE — U0003: CPT

## 2022-12-11 PROCEDURE — U0005: CPT

## 2022-12-11 PROCEDURE — C9803: CPT

## 2022-12-13 ENCOUNTER — TRANSCRIPTION ENCOUNTER (OUTPATIENT)
Age: 62
End: 2022-12-13

## 2022-12-14 ENCOUNTER — APPOINTMENT (OUTPATIENT)
Dept: ORTHOPEDIC SURGERY | Facility: HOSPITAL | Age: 62
End: 2022-12-14

## 2022-12-14 ENCOUNTER — TRANSCRIPTION ENCOUNTER (OUTPATIENT)
Age: 62
End: 2022-12-14

## 2022-12-14 ENCOUNTER — RESULT REVIEW (OUTPATIENT)
Age: 62
End: 2022-12-14

## 2022-12-14 ENCOUNTER — OUTPATIENT (OUTPATIENT)
Dept: OUTPATIENT SERVICES | Facility: HOSPITAL | Age: 62
LOS: 1 days | End: 2022-12-14
Payer: COMMERCIAL

## 2022-12-14 VITALS
DIASTOLIC BLOOD PRESSURE: 76 MMHG | TEMPERATURE: 97 F | HEART RATE: 73 BPM | SYSTOLIC BLOOD PRESSURE: 163 MMHG | WEIGHT: 175.05 LBS | OXYGEN SATURATION: 100 % | HEIGHT: 64 IN | RESPIRATION RATE: 16 BRPM

## 2022-12-14 DIAGNOSIS — M17.12 UNILATERAL PRIMARY OSTEOARTHRITIS, LEFT KNEE: ICD-10-CM

## 2022-12-14 DIAGNOSIS — R58 HEMORRHAGE, NOT ELSEWHERE CLASSIFIED: Chronic | ICD-10-CM

## 2022-12-14 DIAGNOSIS — Z98.890 OTHER SPECIFIED POSTPROCEDURAL STATES: Chronic | ICD-10-CM

## 2022-12-14 LAB
ANION GAP SERPL CALC-SCNC: 8 MMOL/L — SIGNIFICANT CHANGE UP (ref 5–17)
BUN SERPL-MCNC: 11 MG/DL — SIGNIFICANT CHANGE UP (ref 7–23)
CALCIUM SERPL-MCNC: 8.5 MG/DL — SIGNIFICANT CHANGE UP (ref 8.4–10.5)
CHLORIDE SERPL-SCNC: 109 MMOL/L — HIGH (ref 96–108)
CO2 SERPL-SCNC: 23 MMOL/L — SIGNIFICANT CHANGE UP (ref 22–31)
CREAT SERPL-MCNC: 0.5 MG/DL — SIGNIFICANT CHANGE UP (ref 0.5–1.3)
EGFR: 105 ML/MIN/1.73M2 — SIGNIFICANT CHANGE UP
GLUCOSE SERPL-MCNC: 122 MG/DL — HIGH (ref 70–99)
HCT VFR BLD CALC: 36.4 % — SIGNIFICANT CHANGE UP (ref 34.5–45)
HGB BLD-MCNC: 11.6 G/DL — SIGNIFICANT CHANGE UP (ref 11.5–15.5)
MCHC RBC-ENTMCNC: 27.8 PG — SIGNIFICANT CHANGE UP (ref 27–34)
MCHC RBC-ENTMCNC: 31.9 GM/DL — LOW (ref 32–36)
MCV RBC AUTO: 87.1 FL — SIGNIFICANT CHANGE UP (ref 80–100)
NRBC # BLD: 0 /100 WBCS — SIGNIFICANT CHANGE UP (ref 0–0)
PLATELET # BLD AUTO: 207 K/UL — SIGNIFICANT CHANGE UP (ref 150–400)
POTASSIUM SERPL-MCNC: 3.8 MMOL/L — SIGNIFICANT CHANGE UP (ref 3.5–5.3)
POTASSIUM SERPL-SCNC: 3.8 MMOL/L — SIGNIFICANT CHANGE UP (ref 3.5–5.3)
RBC # BLD: 4.18 M/UL — SIGNIFICANT CHANGE UP (ref 3.8–5.2)
RBC # FLD: 14 % — SIGNIFICANT CHANGE UP (ref 10.3–14.5)
SODIUM SERPL-SCNC: 140 MMOL/L — SIGNIFICANT CHANGE UP (ref 135–145)
WBC # BLD: 8.34 K/UL — SIGNIFICANT CHANGE UP (ref 3.8–10.5)
WBC # FLD AUTO: 8.34 K/UL — SIGNIFICANT CHANGE UP (ref 3.8–10.5)

## 2022-12-14 PROCEDURE — 73560 X-RAY EXAM OF KNEE 1 OR 2: CPT | Mod: 26,LT

## 2022-12-14 PROCEDURE — 20985 CPTR-ASST DIR MS PX: CPT

## 2022-12-14 PROCEDURE — 27447 TOTAL KNEE ARTHROPLASTY: CPT | Mod: LT

## 2022-12-14 DEVICE — INSERT TIB BEARING CS X3 SZ 2 9MM: Type: IMPLANTABLE DEVICE | Status: FUNCTIONAL

## 2022-12-14 DEVICE — MAKO BONE PIN 4MM X 140MM: Type: IMPLANTABLE DEVICE | Status: FUNCTIONAL

## 2022-12-14 DEVICE — IMP PATELLA SYMMETRIC 31X9MM: Type: IMPLANTABLE DEVICE | Status: FUNCTIONAL

## 2022-12-14 DEVICE — BASEPLATE TIB TRIATHLON TRITAN SZ 2: Type: IMPLANTABLE DEVICE | Status: FUNCTIONAL

## 2022-12-14 DEVICE — COMP FEM CR CMNTLSS BEADED W/ PA SZ 3 LT: Type: IMPLANTABLE DEVICE | Status: FUNCTIONAL

## 2022-12-14 DEVICE — MAKO BONE PIN 4MM X 110MM: Type: IMPLANTABLE DEVICE | Status: FUNCTIONAL

## 2022-12-14 RX ORDER — OXYCODONE HYDROCHLORIDE 5 MG/1
10 TABLET ORAL
Refills: 0 | Status: DISCONTINUED | OUTPATIENT
Start: 2022-12-14 | End: 2022-12-15

## 2022-12-14 RX ORDER — PANTOPRAZOLE SODIUM 20 MG/1
40 TABLET, DELAYED RELEASE ORAL ONCE
Refills: 0 | Status: COMPLETED | OUTPATIENT
Start: 2022-12-14 | End: 2022-12-14

## 2022-12-14 RX ORDER — LIDOCAINE HCL 20 MG/ML
0.2 VIAL (ML) INJECTION ONCE
Refills: 0 | Status: COMPLETED | OUTPATIENT
Start: 2022-12-14 | End: 2022-12-14

## 2022-12-14 RX ORDER — SODIUM CHLORIDE 9 MG/ML
500 INJECTION, SOLUTION INTRAVENOUS ONCE
Refills: 0 | Status: COMPLETED | OUTPATIENT
Start: 2022-12-15 | End: 2022-12-15

## 2022-12-14 RX ORDER — HYDROMORPHONE HYDROCHLORIDE 2 MG/ML
0.5 INJECTION INTRAMUSCULAR; INTRAVENOUS; SUBCUTANEOUS ONCE
Refills: 0 | Status: DISCONTINUED | OUTPATIENT
Start: 2022-12-14 | End: 2022-12-28

## 2022-12-14 RX ORDER — KETOROLAC TROMETHAMINE 30 MG/ML
15 SYRINGE (ML) INJECTION EVERY 6 HOURS
Refills: 0 | Status: COMPLETED | OUTPATIENT
Start: 2022-12-14 | End: 2022-12-15

## 2022-12-14 RX ORDER — ACETAMINOPHEN 500 MG
1000 TABLET ORAL ONCE
Refills: 0 | Status: COMPLETED | OUTPATIENT
Start: 2022-12-15 | End: 2022-12-15

## 2022-12-14 RX ORDER — TRAMADOL HYDROCHLORIDE 50 MG/1
50 TABLET ORAL EVERY 6 HOURS
Refills: 0 | Status: DISCONTINUED | OUTPATIENT
Start: 2022-12-14 | End: 2022-12-14

## 2022-12-14 RX ORDER — CEFAZOLIN SODIUM 1 G
2000 VIAL (EA) INJECTION EVERY 8 HOURS
Refills: 0 | Status: COMPLETED | OUTPATIENT
Start: 2022-12-14 | End: 2022-12-15

## 2022-12-14 RX ORDER — SENNA PLUS 8.6 MG/1
2 TABLET ORAL AT BEDTIME
Refills: 0 | Status: DISCONTINUED | OUTPATIENT
Start: 2022-12-14 | End: 2022-12-28

## 2022-12-14 RX ORDER — CEFAZOLIN SODIUM 1 G
2000 VIAL (EA) INJECTION ONCE
Refills: 0 | Status: COMPLETED | OUTPATIENT
Start: 2022-12-14 | End: 2022-12-14

## 2022-12-14 RX ORDER — ACETAMINOPHEN 500 MG
1000 TABLET ORAL ONCE
Refills: 0 | Status: COMPLETED | OUTPATIENT
Start: 2022-12-14 | End: 2022-12-14

## 2022-12-14 RX ORDER — ASPIRIN/CALCIUM CARB/MAGNESIUM 324 MG
81 TABLET ORAL
Refills: 0 | Status: DISCONTINUED | OUTPATIENT
Start: 2022-12-14 | End: 2022-12-28

## 2022-12-14 RX ORDER — CHLORHEXIDINE GLUCONATE 213 G/1000ML
1 SOLUTION TOPICAL ONCE
Refills: 0 | Status: COMPLETED | OUTPATIENT
Start: 2022-12-14 | End: 2022-12-14

## 2022-12-14 RX ORDER — POLYETHYLENE GLYCOL 3350 17 G/17G
17 POWDER, FOR SOLUTION ORAL AT BEDTIME
Refills: 0 | Status: DISCONTINUED | OUTPATIENT
Start: 2022-12-14 | End: 2022-12-28

## 2022-12-14 RX ORDER — PANTOPRAZOLE SODIUM 20 MG/1
40 TABLET, DELAYED RELEASE ORAL
Refills: 0 | Status: DISCONTINUED | OUTPATIENT
Start: 2022-12-14 | End: 2022-12-28

## 2022-12-14 RX ORDER — SODIUM CHLORIDE 9 MG/ML
500 INJECTION INTRAMUSCULAR; INTRAVENOUS; SUBCUTANEOUS ONCE
Refills: 0 | Status: DISCONTINUED | OUTPATIENT
Start: 2022-12-14 | End: 2022-12-14

## 2022-12-14 RX ORDER — ONDANSETRON 8 MG/1
4 TABLET, FILM COATED ORAL EVERY 6 HOURS
Refills: 0 | Status: DISCONTINUED | OUTPATIENT
Start: 2022-12-14 | End: 2022-12-28

## 2022-12-14 RX ORDER — VALSARTAN 80 MG/1
320 TABLET ORAL DAILY
Refills: 0 | Status: DISCONTINUED | OUTPATIENT
Start: 2022-12-14 | End: 2022-12-28

## 2022-12-14 RX ORDER — MAGNESIUM HYDROXIDE 400 MG/1
30 TABLET, CHEWABLE ORAL DAILY
Refills: 0 | Status: DISCONTINUED | OUTPATIENT
Start: 2022-12-14 | End: 2022-12-28

## 2022-12-14 RX ORDER — TRAMADOL HYDROCHLORIDE 50 MG/1
50 TABLET ORAL ONCE
Refills: 0 | Status: DISCONTINUED | OUTPATIENT
Start: 2022-12-14 | End: 2022-12-14

## 2022-12-14 RX ORDER — AMLODIPINE BESYLATE 2.5 MG/1
10 TABLET ORAL DAILY
Refills: 0 | Status: DISCONTINUED | OUTPATIENT
Start: 2022-12-14 | End: 2022-12-28

## 2022-12-14 RX ORDER — VENLAFAXINE HCL 75 MG
37.5 CAPSULE, EXT RELEASE 24 HR ORAL DAILY
Refills: 0 | Status: DISCONTINUED | OUTPATIENT
Start: 2022-12-14 | End: 2022-12-28

## 2022-12-14 RX ADMIN — SODIUM CHLORIDE 3 MILLILITER(S): 9 INJECTION INTRAMUSCULAR; INTRAVENOUS; SUBCUTANEOUS at 21:05

## 2022-12-14 RX ADMIN — Medication 81 MILLIGRAM(S): at 21:10

## 2022-12-14 RX ADMIN — Medication 15 MILLIGRAM(S): at 22:49

## 2022-12-14 RX ADMIN — SODIUM CHLORIDE 100 MILLILITER(S): 9 INJECTION, SOLUTION INTRAVENOUS at 12:42

## 2022-12-14 RX ADMIN — SODIUM CHLORIDE 100 MILLILITER(S): 9 INJECTION, SOLUTION INTRAVENOUS at 18:28

## 2022-12-14 RX ADMIN — Medication 400 MILLIGRAM(S): at 22:11

## 2022-12-14 RX ADMIN — Medication 15 MILLIGRAM(S): at 23:37

## 2022-12-14 RX ADMIN — SODIUM CHLORIDE 3 MILLILITER(S): 9 INJECTION INTRAMUSCULAR; INTRAVENOUS; SUBCUTANEOUS at 12:44

## 2022-12-14 RX ADMIN — OXYCODONE HYDROCHLORIDE 10 MILLIGRAM(S): 5 TABLET ORAL at 18:27

## 2022-12-14 RX ADMIN — CHLORHEXIDINE GLUCONATE 1 APPLICATION(S): 213 SOLUTION TOPICAL at 12:42

## 2022-12-14 RX ADMIN — PANTOPRAZOLE SODIUM 40 MILLIGRAM(S): 20 TABLET, DELAYED RELEASE ORAL at 12:42

## 2022-12-14 RX ADMIN — TRAMADOL HYDROCHLORIDE 50 MILLIGRAM(S): 50 TABLET ORAL at 12:42

## 2022-12-14 RX ADMIN — Medication 1000 MILLIGRAM(S): at 22:27

## 2022-12-14 RX ADMIN — Medication 100 MILLIGRAM(S): at 21:45

## 2022-12-14 RX ADMIN — AMLODIPINE BESYLATE 10 MILLIGRAM(S): 2.5 TABLET ORAL at 22:10

## 2022-12-14 NOTE — ASU PREOP CHECKLIST - WEIGHT IN KG
Daily exercise and controlled weight  He could have white coat hypertension which could cause elevation of blood pressure in doctor's office  79.4

## 2022-12-14 NOTE — ASU DISCHARGE PLAN (ADULT/PEDIATRIC) - CARE PROVIDER_API CALL
Jaspal Shelton)  Orthopaedic Surgery  [address], [address]  [place] [address]  Phone: [phone]  Fax: [phone]  Follow Up Time:

## 2022-12-14 NOTE — PHYSICAL THERAPY INITIAL EVALUATION ADULT - RANGE OF MOTION EXAMINATION, REHAB EVAL
LLE limited to approx 1/2 available ROM grossly/bilateral upper extremity ROM was WFL (within functional limits)/Right LE ROM was WFL (within functional limits)

## 2022-12-14 NOTE — CHART NOTE - NSCHARTNOTEFT_GEN_A_CORE
Patient seen in PACU resting without complaints.  No Chest Pain, SOB, N/V.    T(C): 36.5 (12-14-22 @ 16:57), Max: 36.5 (12-14-22 @ 16:57)  HR: 86 (12-14-22 @ 19:58) (72 - 86)  BP: 135/63 (12-14-22 @ 19:58) (112/60 - 177/77)  RR: 18 (12-14-22 @ 19:00) (16 - 20)  SpO2: 98% (12-14-22 @ 19:58) (96% - 100%)  Wt(kg): --    Exam:  Alert and Oriented, No Acute Distress  Laterality: L knee aquacel in place, C/D/I  Calves soft, non-tender bilaterally  +PF/DF/EHL/FHL  SILT  + DP pulse    Xray:----                          11.6   8.34  )-----------( 207      ( 14 Dec 2022 17:27 )             36.4    12-14    140  |  109<H>  |  11  ----------------------------<  122<H>  3.8   |  23  |  0.50    Ca    8.5      14 Dec 2022 17:27        A/P: 62yFemale S/p L TKA . VSS. NAD    -PT/OT-WBAT LLE  -IS encouraged  -DVT PPx with A81 BID  -Pain Control      Aissatou Posada PA-C  Team Pager #[phone]

## 2022-12-14 NOTE — PACU DISCHARGE NOTE - COMMENTS
The patient meets PACU discharge criteria but will remain overnight for surgical management.  Report given to the surgical floor team.

## 2022-12-14 NOTE — ASU DISCHARGE PLAN (ADULT/PEDIATRIC) - NS MD DC FALL RISK RISK
For information on Fall & Injury Prevention, visit: [email or web address] OR  [email or web address] OR  https://www.cdc.gov/steadi/patient.html

## 2022-12-14 NOTE — ASU DISCHARGE PLAN (ADULT/PEDIATRIC) - NURSING INSTRUCTIONS
You were given Tylenol during your visit, the next dose of Tylenol will be on or after _____2:00 PM______ ,today/tonight and every 6 hours afterwards for pain management, do not take any Tylenol containing products until this time. Do not exceed more than 4000mg of Tylenol in one 24 hour setting. If no contraindications, you may alternate with Ibuprofen or Naproxen 3 hours after dose of Tylenol. Ibuprofen can be taken every 6 hours. Naproxen may be taken every 12 hours.

## 2022-12-14 NOTE — OCCUPATIONAL THERAPY INITIAL EVALUATION ADULT - ADDITIONAL COMMENTS
pt reports lives with  and daughter in house, 5 steps to enter +esha hand rail, 1 level inside, walk-in shower. prior to admission Ind with ADLs/ambulating with RW/cane.

## 2022-12-14 NOTE — PHYSICAL THERAPY INITIAL EVALUATION ADULT - ACTIVE RANGE OF MOTION EXAMINATION, REHAB EVAL
LLE limited to approx 1/2 available ROM grossly/bilateral upper extremity Active ROM was WFL (within functional limits)/Right LE Active ROM was WFL (within functional limits)

## 2022-12-14 NOTE — OCCUPATIONAL THERAPY INITIAL EVALUATION ADULT - PERTINENT HX OF CURRENT PROBLEM, REHAB EVAL
61 yo female  with h/o HTN, GERD robles's esophagus , OA c/o left knee pain x 2 years, had no relief with conservative treatment. Pt had f/u orthopedic consult- X-ray/ MRI revealed osteoarthritis left knee. Pt scheduled for left total knee arthroplasty w/ Robotic assist with FILOMENA on 12/4/22. now s/p L TKA 12/14.

## 2022-12-14 NOTE — ASU PATIENT PROFILE, ADULT - FALL HARM RISK - UNIVERSAL INTERVENTIONS
Bed in lowest position, wheels locked, appropriate side rails in place/Call bell, personal items and telephone in reach/Instruct patient to call for assistance before getting out of bed or chair/Non-slip footwear when patient is out of bed/[place] to call system/Physically safe environment - no spills, clutter or unnecessary equipment/Purposeful Proactive Rounding/Room/bathroom lighting operational, light cord in reach

## 2022-12-14 NOTE — ASU DISCHARGE PLAN (ADULT/PEDIATRIC) - ASU DC SPECIAL INSTRUCTIONSFT
Please follow up with Dr. Shelton at your scheduled follow up appointment in 2 weeks (Call office to confirm appointment).  PT-weight bearing as tolerated.  Aspirin 81mg twice daily x 4 weeks total for dvt prevention.  Keep dressing clean, dry and intact until date listed on dressing.  Have doctor remove any sutures (if applicable) at follow up visit.      Please follow up with your PMD within 1 month for routine checkup.

## 2022-12-14 NOTE — ASU PATIENT PROFILE, ADULT - NSICDXPASTMEDICALHX_GEN_ALL_CORE_FT
PAST MEDICAL HISTORY:  Franco's esophagus     COVID-19 virus detected 2020 with mild symptoms    Esophageal ulcer     GERD (gastroesophageal reflux disease) Resolved    H/O hiatal hernia     HTN (hypertension)     Miscarriage x 2    Moderate anxiety     OA (osteoarthritis)

## 2022-12-14 NOTE — OCCUPATIONAL THERAPY INITIAL EVALUATION ADULT - TOILETING, PREVIOUS LEVEL OF FUNCTION, OT EVAL
[] [place]       Occupational Therapy             1st floor       [address], [place]         Phone: [phone]       Fax: [phone] [x] [facility] at            [address] , [address] [place] [address]     Phone: [phone]     Fax: [phone]      Occupational Therapy Daily Treatment Note    Date:  3/9/2020  Patient Name:  Jacinto Royal    :  1973  MRN: [id number]  Physician: Idalia Lyons MD  Insurance: [id number] [employer]     Medical Diagnosis: Laceration of extensor tendon of left thumb [id number].             Rehab Codes: Stiffness in hand M25.64,, edema localized R60.0, pain in left finger(s) M79.645, muscle wasting of hand M62.54, anesthesia of kin R20.0, hyposthesia of skin R20.1,  adherent scar L90.5.      Onset Date: 19               Next Dr. Oracio Peterson: 20  1:00  PM  [address] [facility] office  #Visitis/Total Visits:    3 times a week for 36 visits C-9 auth'd  1/15/20 to 20  Cancels/No Shows:  1/0    Subjective:    Pain:  [x] Yes  [] No Location:Left thumb Pain Rating: (0-10 scale) 4/10 pre-treatment, 3/10 post treatment  Pain altered Tx:  [x] No  [] Yes  Action: NA  Pt Comments: NA    Objective:  Modalities:  Modality Flow Sheet:  START STOP Tx Modality       Electrical Stim:      20   Ultrasound: 0.8 W/cm2 x 8 mins total  Duty factor: __100%  __50%  __33% __20%  Head size: 2 cm  MHz: __1mHz  __3mHz  Location: dorsal and volar left thumb, 4 minutes each       Hot Pack:       Cold Pack:      Exercises:  Flow Sheet:  Exercise Reps/Time Weight/Level Comments   Edema massage     Not Administered         Scar massage  With mini-massager 5 Minutes     Administered       PROM - left thumb   Not Administered   AROM left wrist, thumb 20 reps   Completed     Scar management - putty roll  5 minutes  Green  Not Completed   Plans to do at home      Active composite thumb extension with cotton balls      Discontinued   Digi-sleeve 70/49    WNL/decr   MCP   +2/68     WNL/WNL   IP +10/+5    WNL/same   Blocked IP flexion: 10 degrees      Specific Instructions for Next Treatment: Progress Note with measurements planned. Treatment Charges: Mins Units Time In/Out   [x]?   Modalities: Ultrasound   8  1 [id number] - [id number]   [x]?   Ther Exercise 17  1 [id number] - [id number]   [x]?   Manual Therapy   5  0 5623 - [id number]   []?  Ther Activities         []?           []?           []?           Total Treatment time 30 min           Assessment: [x] Progressing toward goals. [] No change. [] Other:    Short Term Goals: (  10    Treatments)  1. Decrease Pain: Pain will be 3/10 or less with non-resistive to mildly resistive activity - Unmet (4/10). 2. Increase AROM (degrees): Left wrist extension/flexion will be +65/65 or more degrees (WNL) - MET (+70/68), radial/ulnar wrist deviation will be 20/40 or more degrees (WNL) - MET (22/58), left forearm supination will be 80 or more degrees (WNL) - MET (90), left thumb MCP joint flexion will be 55 or more degrees (WNL) - MET (68 degrees), Left thumb IP joint will have a 10 degree arc of motion (currently 0 motion) - MET (5 degree arc of motion). 3. Increase strength (pounds): left  strength will be 52 or more pounds - MET (64 pounds), pinches will be 10 or more pounds - MET for all. 4. Increase function: UE Functional Index Score to 25/80 or more points to promote increased functional abilities - MET (37/80). 5. Scar will be soft and pliable with minimal tethering - Improved, Unmet. 6. Decrease Edema: Variance of circumfirential measurements at P1 of thumbs will be 0.5 cm or less - Improved, Unmet (0.7 cm variance). 7. Independent with HEP in 3 sessions - MET.     Long Term Goals: (  20  Treatments)  1. Decrease Pain: Pain will be 1/10 or less with mild to moderately resistive activity. Ongoing  2. Increase AROM (degrees): Left thumb IP joint will have 20 degrees or more of active flexion. Ongoing  3.    Increase strength (pounds): Left  strength will be 62 or more pounds - MET New  strength goal (revised 2/26/20: Left  strength will be 81 or more pounds, pinches will be 10 - 15 pounds - MET   New pinch goal (revised 2/26/20):  pinches will be 16-22 pounds. 4.   Increase function: UE Functional Index Score to 40/80 or more points to promote increased functional abilities. Ongoing  5. Decrease Edema: Variance of circumfirential measurements at P1 of thumbs will be 0.2 cm or less. Ongoing     Patient Goals: Return to work - Unmet, hold a cup with the left hand - Improved, Unmet, sweep with the left hand - Improved, Unmet. New goal (revised 02/10/20): open a door with the left hand - Unmet.        Pt. Education:  [] Yes  [x] No  [] Reviewed Prior HEP/Ed  Method of Education: [] Verbal  [] Demo  [] Written  Re:  Comprehension of Education:  [] Verbalizes understanding. [] Demonstrates understanding. [] Needs review. [] Demonstrates/verbalizes HEP/Ed previously given. Treatment Plan:  Frequency/Duration:     3  Times a week, for   36   Visits to promote functional /grasp with left thumb for return to work. Time In/Time Out: 6431 - 3118  Total Treatment Time: 30 Min.       Electronically signed by:  BRIELLE Castillo/MARGARITA, AVANIT independent

## 2022-12-15 ENCOUNTER — NON-APPOINTMENT (OUTPATIENT)
Age: 62
End: 2022-12-15

## 2022-12-15 VITALS
TEMPERATURE: 98 F | RESPIRATION RATE: 18 BRPM | DIASTOLIC BLOOD PRESSURE: 55 MMHG | HEART RATE: 76 BPM | OXYGEN SATURATION: 98 % | SYSTOLIC BLOOD PRESSURE: 108 MMHG

## 2022-12-15 PROCEDURE — C1776: CPT

## 2022-12-15 PROCEDURE — S2900: CPT

## 2022-12-15 PROCEDURE — 85027 COMPLETE CBC AUTOMATED: CPT

## 2022-12-15 PROCEDURE — 97116 GAIT TRAINING THERAPY: CPT

## 2022-12-15 PROCEDURE — 20985 CPTR-ASST DIR MS PX: CPT

## 2022-12-15 PROCEDURE — 73560 X-RAY EXAM OF KNEE 1 OR 2: CPT

## 2022-12-15 PROCEDURE — 86901 BLOOD TYPING SEROLOGIC RH(D): CPT

## 2022-12-15 PROCEDURE — 80048 BASIC METABOLIC PNL TOTAL CA: CPT

## 2022-12-15 PROCEDURE — 27447 TOTAL KNEE ARTHROPLASTY: CPT | Mod: LT

## 2022-12-15 PROCEDURE — 82962 GLUCOSE BLOOD TEST: CPT

## 2022-12-15 PROCEDURE — 86850 RBC ANTIBODY SCREEN: CPT

## 2022-12-15 PROCEDURE — 86900 BLOOD TYPING SEROLOGIC ABO: CPT

## 2022-12-15 PROCEDURE — 97161 PT EVAL LOW COMPLEX 20 MIN: CPT

## 2022-12-15 PROCEDURE — C1713: CPT

## 2022-12-15 PROCEDURE — 36415 COLL VENOUS BLD VENIPUNCTURE: CPT

## 2022-12-15 PROCEDURE — 97166 OT EVAL MOD COMPLEX 45 MIN: CPT

## 2022-12-15 RX ADMIN — Medication 1000 MILLIGRAM(S): at 07:09

## 2022-12-15 RX ADMIN — SODIUM CHLORIDE 3 MILLILITER(S): 9 INJECTION INTRAMUSCULAR; INTRAVENOUS; SUBCUTANEOUS at 05:11

## 2022-12-15 RX ADMIN — Medication 400 MILLIGRAM(S): at 07:00

## 2022-12-15 RX ADMIN — SODIUM CHLORIDE 1000 MILLILITER(S): 9 INJECTION, SOLUTION INTRAVENOUS at 05:12

## 2022-12-15 RX ADMIN — Medication 15 MILLIGRAM(S): at 05:08

## 2022-12-15 RX ADMIN — Medication 81 MILLIGRAM(S): at 05:07

## 2022-12-15 RX ADMIN — PANTOPRAZOLE SODIUM 40 MILLIGRAM(S): 20 TABLET, DELAYED RELEASE ORAL at 05:07

## 2022-12-15 RX ADMIN — OXYCODONE HYDROCHLORIDE 10 MILLIGRAM(S): 5 TABLET ORAL at 00:35

## 2022-12-15 RX ADMIN — Medication 100 MILLIGRAM(S): at 05:08

## 2022-12-15 RX ADMIN — Medication 15 MILLIGRAM(S): at 05:07

## 2022-12-15 RX ADMIN — OXYCODONE HYDROCHLORIDE 10 MILLIGRAM(S): 5 TABLET ORAL at 01:14

## 2022-12-30 ENCOUNTER — APPOINTMENT (OUTPATIENT)
Dept: ORTHOPEDIC SURGERY | Facility: CLINIC | Age: 62
End: 2022-12-30
Payer: COMMERCIAL

## 2022-12-30 PROCEDURE — 73564 X-RAY EXAM KNEE 4 OR MORE: CPT | Mod: 26,LT

## 2022-12-30 PROCEDURE — 99024 POSTOP FOLLOW-UP VISIT: CPT

## 2022-12-30 NOTE — DISCUSSION/SUMMARY
[de-identified] : The patient is doing well after joint replacement surgery. Written infectious precautions were reviewed. The patient will progress with physical therapy at this time and they will work on transitioning from requiring assistive devices for ambulation. Anti-coagulant therapy will be discontinued at 1 month post surgery for the purpose of orthopedic thromboembolism prophylaxis. Return around the 6 week anniversary from surgery for follow-up evaluation.

## 2022-12-30 NOTE — PHYSICAL EXAM
[de-identified] : Well developed, well nourished in no apparent distress, awake, alert and orientated to person, place and time with appropriate mood and affect\par Respirations are even and unlabored. Gait evaluation does not reveal a limp. There is no inguinal adenopathy. The affected limb is well-perfused with palpable pedal pulse, without skin lesions, shows a grossly normal motor and sensory examination. Incision is healed. Knee motion is 0-90 [de-identified] : AP, lateral, sunrise knee and tunnel  x-rays of the left knee were ordered and obtained in the office and demonstrate satisfactory position and alignment of the components are present. No signs of loosening are seen.

## 2022-12-30 NOTE — HISTORY OF PRESENT ILLNESS
[de-identified] : Status-post left total knee  arthroplasty here for initial postoperative evaluation. Excellent progress is noted in terms of pain and restoration of function. Pain is well controlled with oral medications. There has been no change in medical health since discharge. The patient does require assistive devices.

## 2022-12-30 NOTE — REASON FOR VISIT
Post-Operative Visit] : a post-operative visit for

## 2023-02-10 ENCOUNTER — NON-APPOINTMENT (OUTPATIENT)
Age: 63
End: 2023-02-10

## 2023-02-10 ENCOUNTER — APPOINTMENT (OUTPATIENT)
Dept: INTERNAL MEDICINE | Facility: CLINIC | Age: 63
End: 2023-02-10
Payer: MEDICARE

## 2023-02-10 ENCOUNTER — APPOINTMENT (OUTPATIENT)
Dept: CT IMAGING | Facility: CLINIC | Age: 63
End: 2023-02-10
Payer: MEDICARE

## 2023-02-10 ENCOUNTER — APPOINTMENT (OUTPATIENT)
Dept: ORTHOPEDIC SURGERY | Facility: CLINIC | Age: 63
End: 2023-02-10
Payer: SELF-PAY

## 2023-02-10 VITALS
TEMPERATURE: 97.4 F | SYSTOLIC BLOOD PRESSURE: 150 MMHG | HEART RATE: 93 BPM | OXYGEN SATURATION: 98 % | HEIGHT: 64 IN | DIASTOLIC BLOOD PRESSURE: 80 MMHG | BODY MASS INDEX: 30.22 KG/M2 | WEIGHT: 177 LBS | RESPIRATION RATE: 15 BRPM

## 2023-02-10 DIAGNOSIS — R31.29 OTHER MICROSCOPIC HEMATURIA: ICD-10-CM

## 2023-02-10 DIAGNOSIS — R10.9 UNSPECIFIED ABDOMINAL PAIN: ICD-10-CM

## 2023-02-10 DIAGNOSIS — R35.0 FREQUENCY OF MICTURITION: ICD-10-CM

## 2023-02-10 DIAGNOSIS — K22.89 OTHER SPECIFIED DISEASE OF ESOPHAGUS: ICD-10-CM

## 2023-02-10 DIAGNOSIS — Z87.19 PERSONAL HISTORY OF OTHER DISEASES OF THE DIGESTIVE SYSTEM: ICD-10-CM

## 2023-02-10 LAB
BILIRUB UR QL STRIP: NORMAL
CLARITY UR: CLEAR
COLLECTION METHOD: NORMAL
GLUCOSE UR-MCNC: NORMAL
HCG UR QL: 0.2 EU/DL
HGB UR QL STRIP.AUTO: NORMAL
KETONES UR-MCNC: NORMAL
LEUKOCYTE ESTERASE UR QL STRIP: NORMAL
NITRITE UR QL STRIP: NORMAL
PH UR STRIP: 7
PROT UR STRIP-MCNC: NORMAL
SP GR UR STRIP: 1.02

## 2023-02-10 PROCEDURE — 99024 POSTOP FOLLOW-UP VISIT: CPT

## 2023-02-10 PROCEDURE — 81003 URINALYSIS AUTO W/O SCOPE: CPT | Mod: QW

## 2023-02-10 PROCEDURE — 99214 OFFICE O/P EST MOD 30 MIN: CPT | Mod: 25

## 2023-02-10 PROCEDURE — 74176 CT ABD & PELVIS W/O CONTRAST: CPT

## 2023-02-10 RX ORDER — TRAMADOL HYDROCHLORIDE 50 MG/1
50 TABLET, COATED ORAL
Qty: 40 | Refills: 0 | Status: DISCONTINUED | COMMUNITY
Start: 2022-12-23 | End: 2023-02-10

## 2023-02-10 RX ORDER — ASPIRIN 81 MG/1
81 TABLET ORAL
Qty: 60 | Refills: 0 | Status: DISCONTINUED | COMMUNITY
Start: 2022-12-05 | End: 2023-02-10

## 2023-02-10 RX ORDER — TRAMADOL HYDROCHLORIDE 50 MG/1
50 TABLET, COATED ORAL
Qty: 30 | Refills: 0 | Status: DISCONTINUED | COMMUNITY
Start: 2022-12-05 | End: 2023-02-10

## 2023-02-10 RX ORDER — OXYCODONE 5 MG/1
5 TABLET ORAL
Qty: 30 | Refills: 0 | Status: DISCONTINUED | COMMUNITY
Start: 2022-12-05 | End: 2023-02-10

## 2023-02-10 RX ORDER — TRAMADOL HYDROCHLORIDE 50 MG/1
50 TABLET, COATED ORAL
Qty: 40 | Refills: 0 | Status: DISCONTINUED | COMMUNITY
Start: 2022-12-30 | End: 2023-02-10

## 2023-02-10 RX ORDER — NAPROXEN 500 MG/1
500 TABLET ORAL
Qty: 40 | Refills: 1 | Status: DISCONTINUED | COMMUNITY
Start: 2022-12-05 | End: 2023-02-10

## 2023-02-10 NOTE — REVIEW OF SYSTEMS
[Fever] : no fever [Chills] : no chills [Fatigue] : no fatigue [Chest Pain] : no chest pain [Palpitations] : no palpitations [Lower Ext Edema] : no lower extremity edema [Shortness Of Breath] : no shortness of breath [Wheezing] : no wheezing [Cough] : no cough [Dyspnea on Exertion] : no dyspnea on exertion [Abdominal Pain] : no abdominal pain [Nausea] : nausea [Diarrhea] : diarrhea [Vomiting] : no vomiting [Heartburn] : heartburn [Dysuria] : no dysuria [Skin Rash] : no skin rash [Negative] : Psychiatric [FreeTextEntry8] : see HPI

## 2023-02-10 NOTE — PHYSICAL EXAM
[de-identified] : Well developed, well nourished in no apparent distress, awake, alert and orientated to person, place and time with appropriate mood and affect\par Respirations are even and unlabored. Gait evaluation does not reveal a limp. There is no inguinal adenopathy. The affected limb is well-perfused with palpable pedal pulse, without skin lesions, shows a grossly normal motor and sensory examination. Incision is healed. Knee motion is 0-115

## 2023-02-10 NOTE — PHYSICAL EXAM
[No Acute Distress] : no acute distress [Well-Appearing] : well-appearing [Normal Voice/Communication] : normal voice/communication [Normal Sclera/Conjunctiva] : normal sclera/conjunctiva [PERRL] : pupils equal round and reactive to light [Normal Oropharynx] : the oropharynx was normal [Normal] : normal rate, regular rhythm, normal S1 and S2 and no murmur heard [No Edema] : there was no peripheral edema [Soft] : abdomen soft [Non Tender] : non-tender [Non-distended] : non-distended [No Masses] : no abdominal mass palpated [No HSM] : no HSM [Normal Bowel Sounds] : normal bowel sounds [No CVA Tenderness] : no CVA  tenderness [No Spinal Tenderness] : no spinal tenderness [No Rash] : no rash [No Skin Lesions] : no skin lesions [No Focal Deficits] : no focal deficits [Normal Affect] : the affect was normal [Alert and Oriented x3] : oriented to person, place, and time [Normal Mood] : the mood was normal [Normal Insight/Judgement] : insight and judgment were intact

## 2023-02-10 NOTE — HISTORY OF PRESENT ILLNESS
[de-identified] : Status-post left total knee  arthroplasty here for routine postoperative evaluation. Excellent progress is noted in terms of pain and restoration of function. Pain is well controlled with oral medications. There has been no change in medical health since discharge. The patient does not require assistive devices.

## 2023-02-10 NOTE — ASSESSMENT
[FreeTextEntry1] : Right flank pain/nausea/urinary frequency\par -Urine dip in office shows trace blood and trace protein\par -Check labs including CBC, CMP, UA, urine culture\par -Her symptoms are suggestive of kidney stone so we will order STAT CT of abdomen and pelvis\par -She can use OTC Tylenol as needed for pain\par -I will prescribe her Zofran to use for nausea as needed\par -If her symptoms worsen or if she develops fever or chills she is to go to ER\par \par Dry skin\par -Advised use OTC moisturizer\par \par GERD\par -I will restart pantoprazole 40 mg daily as needed\par -She should notify office if symptoms persist or worsen\par \par Hypertension\par -BP mildly elevated today but she is in pain due to right flank pain which may be contributing\par -She remains on amlodipine 10 mg daily and valsartan 320 mg daily\par \par Depression/anxiety\par -She is on venlafaxine ER 75 mg daily\par \par She has follow-up appointment scheduled with Dr. Anglin 4/2023.  As above if her symptoms worsen she is to go to ER

## 2023-02-10 NOTE — DISCUSSION/SUMMARY
[de-identified] : The patient is doing well after joint replacement surgery. WBAT. Return around the 6 month anniversary from surgery for follow-up evaluation.

## 2023-02-12 ENCOUNTER — NON-APPOINTMENT (OUTPATIENT)
Age: 63
End: 2023-02-12

## 2023-02-12 LAB
ALBUMIN SERPL ELPH-MCNC: 4.5 G/DL
ALP BLD-CCNC: 105 U/L
ALT SERPL-CCNC: 15 U/L
ANION GAP SERPL CALC-SCNC: 12 MMOL/L
APPEARANCE: CLEAR
AST SERPL-CCNC: 14 U/L
BACTERIA UR CULT: NORMAL
BACTERIA: NEGATIVE
BASOPHILS # BLD AUTO: 0.01 K/UL
BASOPHILS NFR BLD AUTO: 0.2 %
BILIRUB SERPL-MCNC: 0.4 MG/DL
BILIRUBIN URINE: NEGATIVE
BLOOD URINE: NEGATIVE
BUN SERPL-MCNC: 15 MG/DL
CALCIUM SERPL-MCNC: 9.2 MG/DL
CHLORIDE SERPL-SCNC: 107 MMOL/L
CO2 SERPL-SCNC: 23 MMOL/L
COLOR: YELLOW
CREAT SERPL-MCNC: 0.64 MG/DL
EGFR: 99 ML/MIN/1.73M2
EOSINOPHIL # BLD AUTO: 0.06 K/UL
EOSINOPHIL NFR BLD AUTO: 0.9 %
GLUCOSE QUALITATIVE U: NEGATIVE
GLUCOSE SERPL-MCNC: 92 MG/DL
HCT VFR BLD CALC: 38.8 %
HGB BLD-MCNC: 11.6 G/DL
HYALINE CASTS: 1 /LPF
IMM GRANULOCYTES NFR BLD AUTO: 0.3 %
KETONES URINE: NEGATIVE
LEUKOCYTE ESTERASE URINE: NEGATIVE
LYMPHOCYTES # BLD AUTO: 2.19 K/UL
LYMPHOCYTES NFR BLD AUTO: 33.1 %
MAN DIFF?: NORMAL
MCHC RBC-ENTMCNC: 27 PG
MCHC RBC-ENTMCNC: 29.9 GM/DL
MCV RBC AUTO: 90.2 FL
MICROSCOPIC-UA: NORMAL
MONOCYTES # BLD AUTO: 0.5 K/UL
MONOCYTES NFR BLD AUTO: 7.6 %
NEUTROPHILS # BLD AUTO: 3.83 K/UL
NEUTROPHILS NFR BLD AUTO: 57.9 %
NITRITE URINE: NEGATIVE
PH URINE: 6.5
PLATELET # BLD AUTO: 262 K/UL
POTASSIUM SERPL-SCNC: 4.6 MMOL/L
PROT SERPL-MCNC: 7.2 G/DL
PROTEIN URINE: ABNORMAL
RBC # BLD: 4.3 M/UL
RBC # FLD: 15.2 %
RED BLOOD CELLS URINE: 2 /HPF
SODIUM SERPL-SCNC: 142 MMOL/L
SPECIFIC GRAVITY URINE: 1.03
SQUAMOUS EPITHELIAL CELLS: 1 /HPF
UROBILINOGEN URINE: NORMAL
WBC # FLD AUTO: 6.61 K/UL
WHITE BLOOD CELLS URINE: 1 /HPF

## 2023-03-16 ENCOUNTER — NON-APPOINTMENT (OUTPATIENT)
Age: 63
End: 2023-03-16

## 2023-03-16 ENCOUNTER — APPOINTMENT (OUTPATIENT)
Dept: GASTROENTEROLOGY | Facility: CLINIC | Age: 63
End: 2023-03-16
Payer: MEDICARE

## 2023-03-16 VITALS
OXYGEN SATURATION: 98 % | TEMPERATURE: 97.2 F | HEIGHT: 64 IN | RESPIRATION RATE: 16 BRPM | HEART RATE: 80 BPM | DIASTOLIC BLOOD PRESSURE: 74 MMHG | SYSTOLIC BLOOD PRESSURE: 142 MMHG | WEIGHT: 181 LBS | BODY MASS INDEX: 30.9 KG/M2

## 2023-03-16 DIAGNOSIS — Z87.898 PERSONAL HISTORY OF OTHER SPECIFIED CONDITIONS: ICD-10-CM

## 2023-03-16 DIAGNOSIS — R91.1 SOLITARY PULMONARY NODULE: ICD-10-CM

## 2023-03-16 DIAGNOSIS — F41.8 OTHER SPECIFIED ANXIETY DISORDERS: ICD-10-CM

## 2023-03-16 DIAGNOSIS — M54.12 RADICULOPATHY, CERVICAL REGION: ICD-10-CM

## 2023-03-16 PROCEDURE — 99204 OFFICE O/P NEW MOD 45 MIN: CPT

## 2023-03-16 RX ORDER — ONDANSETRON 4 MG/1
4 TABLET ORAL
Qty: 15 | Refills: 0 | Status: DISCONTINUED | COMMUNITY
Start: 2023-02-10 | End: 2023-03-16

## 2023-03-16 NOTE — PHYSICAL EXAM
[Alert] : alert [Normal Voice/Communication] : normal voice/communication [Healthy Appearing] : healthy appearing [No Acute Distress] : no acute distress [Obese (BMI >= 30)] : obese (BMI >= 30) [Sclera] : the sclera and conjunctiva were normal [Hearing Threshold Finger Rub Not Sawyer] : hearing was normal [Normal Lips/Gums] : the lips and gums were normal [Normal Appearance] : the appearance of the neck was normal [No Respiratory Distress] : no respiratory distress [No Acc Muscle Use] : no accessory muscle use [Respiration, Rhythm And Depth] : normal respiratory rhythm and effort [Auscultation Breath Sounds / Voice Sounds] : lungs were clear to auscultation bilaterally [Heart Rate And Rhythm] : heart rate was normal and rhythm regular [Normal S1, S2] : normal S1 and S2 [Murmurs] : no murmurs [Bowel Sounds] : normal bowel sounds [Abdomen Tenderness] : non-tender [No Masses] : no abdominal mass palpated [Abdomen Soft] : soft [] : no hepatosplenomegaly [Abnormal Walk] : normal gait [Involuntary Movements] : no involuntary movements were seen [Motor Tone] : muscle strength and tone were normal [Normal Color / Pigmentation] : normal skin color and pigmentation [No Focal Deficits] : no focal deficits [Motor Exam] : the motor exam was normal [Oriented To Time, Place, And Person] : oriented to person, place, and time [Normal Mood] : the mood was normal [Normal Affect] : the affect was normal

## 2023-03-16 NOTE — ASSESSMENT
[FreeTextEntry1] : Despite the diagnosis in the electronic medical record of the patient having short segment Franco's there is no evidence to support that diagnosis.  However she does present with recurrent epigastric bloating which is a chronic but intermittent symptom in this patient as well as postprandial epigastric pain and abnormal imaging of the gastroesophageal junction on CAT scan a follow-up endoscopy is certainly warranted and will be scheduled.  In the meantime she will continue to take pantoprazole 40 mg p.o. every morning.  Further recommendations will be made based upon the results.

## 2023-03-16 NOTE — HISTORY OF PRESENT ILLNESS
[FreeTextEntry1] : The patient has been previously followed by cardiothoracic surgeons and gastroenterologist in [place] for gastroesophageal reflux and a hiatal hernia.  The electronic medical record documents a history of short segment Franco's esophagus however the initial biopsies in 2019 just revealed Collman epithelium with no evidence of metaplasia in the Seshan blue stain was negative.  In 2020 she underwent a Nissen fundoplication and soon thereafter underwent a follow-up endoscopy with advanced imaging.  There was evidence of a very small 2 cm hiatal hernia but biopsies were actually negative for Franco's or dysplasia.  Soon thereafter she was experiencing a great deal of bloating and about 1 year later underwent repair of the Nissen fundoplication due to recurrent hiatal hernia.  She had been taking omeprazole 20 mg daily.  She is now being seen at our facility with complaints of 3 to 4 weeks of recurrent epigastric bloating and postprandial pain that is described as an ache that lasts no more than 1 to 2 minutes after every meal.  She also complains of constant nausea.  She has been switched to pantoprazole 40 mg p.o. every morning and denies any heartburn or dysphagia.  CAT scan of the abdomen and pelvis on February 10 of this year revealed masslike thickening at the GE junction for which upper endoscopy was recommended.  There was a small hypodense focus in the left hepatic lobe which was stable from prior imaging in 2020.  There were also 2 right lower lobe pulmonary nodules the largest of which was 6 mm and were without change from imaging in 2020 as well.  There is no vomiting.  Her appetite and weight are stable.  She last underwent a colonoscopy by Dr. Li in October 2020 which revealed small hemorrhoids as well as some sigmoid diverticuli but was otherwise normal.  Prep was somewhat suboptimal in the cecum.

## 2023-03-23 ENCOUNTER — APPOINTMENT (OUTPATIENT)
Dept: ORTHOPEDIC SURGERY | Facility: CLINIC | Age: 63
End: 2023-03-23
Payer: COMMERCIAL

## 2023-03-23 DIAGNOSIS — Z96.652 PRESENCE OF LEFT ARTIFICIAL KNEE JOINT: ICD-10-CM

## 2023-03-23 DIAGNOSIS — M17.11 UNILATERAL PRIMARY OSTEOARTHRITIS, RIGHT KNEE: ICD-10-CM

## 2023-03-23 PROCEDURE — 73564 X-RAY EXAM KNEE 4 OR MORE: CPT | Mod: 50

## 2023-03-23 PROCEDURE — 99214 OFFICE O/P EST MOD 30 MIN: CPT

## 2023-03-23 NOTE — HISTORY OF PRESENT ILLNESS
[de-identified] : This is very nice 62-year-old female experiencing mild bilateral knee pain.  History of a left total knee arthroplasty.  She admits that she has not been doing the exercise or home exercise programs.  Over the last month the pain somewhat limits activities of daily living. Walking tolerance is somewhat reduced.  Not taking any pain medication for this.  The patient denies any radiation of the pain to the feet and it is not associated with numbness, tingling, or weakness.

## 2023-03-23 NOTE — DISCUSSION/SUMMARY
[de-identified] : This patient has very mild right knee osteoarthritis and a well-functioning left total knee arthroplasty.  Reports that she has been losing some of her motion which is likely tied to the fact that she is not moving it at all with physical therapy.  We will do home exercise program.  We discussed that she may start doing this every day.  I prescribed formal physical therapy.  Meloxicam prescribed.  Follow-up is recommended in 3 to 6 months.

## 2023-03-23 NOTE — PHYSICAL EXAM
[de-identified] : Patient is well nourished, well-developed, in no acute distress, with appropriate mood and affect. The patient is oriented to time, place, and person. Respirations are even and unlabored. Gait evaluation does reveal a limp. There is no inguinal adenopathy. Bilateral limbs are well-perfused, without skin lesions, shows a grossly normal motor and sensory examination. The right knee motion is significantly reduced and does cause significant pain. The right knee moves from 0 to 125 degrees. The knee is stable within that range-of-motion to AP and ML stress. The alignment of the knee is 5 degrees varus. Muscle strength is normal. Pedal pulses are palpable. Hip examination was negative. The left knee motion is significantly reduced and there is painless. The left knee moves from 0-95 degrees.  Previously she was moving 0 to 115 degrees. The knee is stable within that range-of-motion to AP and ML stress. The alignment of the knee is neutral.  Well-healed midline surgical scar muscle strength is normal. Pedal pulses are palpable. Hip examination was negative. [de-identified] : Long standing knee, AP knee, lateral knee, and patellar views of the right knee were ordered and taken in the office and demonstrate mild degenerative joint disease of the knee with joint space narrowing, osteophyte formation, and subchondral sclerosis.\par \par AP, lateral, tunnel, and sunrise knee x-rays of the left knee were ordered and obtained in the office and demonstrate satisfactory position and alignment of the components are present. No signs of loosening are seen.

## 2023-04-08 NOTE — REVIEW OF SYSTEMS
[Arthralgia] : arthralgia [Joint Pain] : joint pain [Joint Stiffness] : joint stiffness [Negative] : Heme/Lymph pt takes atorvastatin at home  c/w home meds pt takes enalapril  c/w home meds  DASH diet

## 2023-04-13 ENCOUNTER — APPOINTMENT (OUTPATIENT)
Dept: FAMILY MEDICINE | Facility: CLINIC | Age: 63
End: 2023-04-13
Payer: COMMERCIAL

## 2023-04-13 VITALS
SYSTOLIC BLOOD PRESSURE: 142 MMHG | BODY MASS INDEX: 31.24 KG/M2 | HEIGHT: 64 IN | HEART RATE: 72 BPM | TEMPERATURE: 98.1 F | WEIGHT: 183 LBS | DIASTOLIC BLOOD PRESSURE: 80 MMHG | RESPIRATION RATE: 15 BRPM | OXYGEN SATURATION: 98 %

## 2023-04-13 DIAGNOSIS — Z12.11 ENCOUNTER FOR SCREENING FOR MALIGNANT NEOPLASM OF COLON: ICD-10-CM

## 2023-04-13 DIAGNOSIS — E04.1 NONTOXIC SINGLE THYROID NODULE: ICD-10-CM

## 2023-04-13 DIAGNOSIS — Z23 ENCOUNTER FOR IMMUNIZATION: ICD-10-CM

## 2023-04-13 DIAGNOSIS — Z00.00 ENCOUNTER FOR GENERAL ADULT MEDICAL EXAMINATION W/OUT ABNORMAL FINDINGS: ICD-10-CM

## 2023-04-13 PROCEDURE — 99396 PREV VISIT EST AGE 40-64: CPT | Mod: 25

## 2023-04-13 PROCEDURE — 96127 BRIEF EMOTIONAL/BEHAV ASSMT: CPT

## 2023-04-13 PROCEDURE — G0447 BEHAVIOR COUNSEL OBESITY 15M: CPT

## 2023-04-13 PROCEDURE — G0009: CPT

## 2023-04-13 PROCEDURE — 90677 PCV20 VACCINE IM: CPT

## 2023-04-17 ENCOUNTER — TRANSCRIPTION ENCOUNTER (OUTPATIENT)
Age: 63
End: 2023-04-17

## 2023-04-17 LAB
25(OH)D3 SERPL-MCNC: 32.3 NG/ML
ALBUMIN SERPL ELPH-MCNC: 4.9 G/DL
ALP BLD-CCNC: 98 U/L
ALT SERPL-CCNC: 18 U/L
ANION GAP SERPL CALC-SCNC: 15 MMOL/L
APPEARANCE: CLEAR
AST SERPL-CCNC: 19 U/L
BASOPHILS # BLD AUTO: 0.02 K/UL
BASOPHILS NFR BLD AUTO: 0.4 %
BILIRUB SERPL-MCNC: 0.3 MG/DL
BILIRUBIN URINE: NEGATIVE
BLOOD URINE: NEGATIVE
BUN SERPL-MCNC: 20 MG/DL
CALCIUM SERPL-MCNC: 9.8 MG/DL
CHLORIDE SERPL-SCNC: 104 MMOL/L
CHOLEST SERPL-MCNC: 193 MG/DL
CO2 SERPL-SCNC: 22 MMOL/L
COLOR: YELLOW
CREAT SERPL-MCNC: 0.57 MG/DL
EGFR: 102 ML/MIN/1.73M2
EOSINOPHIL # BLD AUTO: 0.07 K/UL
EOSINOPHIL NFR BLD AUTO: 1.3 %
ESTIMATED AVERAGE GLUCOSE: 117 MG/DL
FERRITIN SERPL-MCNC: 55 NG/ML
FOLATE SERPL-MCNC: >20 NG/ML
GLUCOSE QUALITATIVE U: NEGATIVE
GLUCOSE SERPL-MCNC: 109 MG/DL
HBA1C MFR BLD HPLC: 5.7 %
HCT VFR BLD CALC: 40.5 %
HCV AB SER QL: NONREACTIVE
HCV S/CO RATIO: 0.09 S/CO
HDLC SERPL-MCNC: 84 MG/DL
HGB BLD-MCNC: 12.5 G/DL
HIV1+2 AB SPEC QL IA.RAPID: NONREACTIVE
IMM GRANULOCYTES NFR BLD AUTO: 0.2 %
IRON SATN MFR SERPL: 18 %
IRON SERPL-MCNC: 77 UG/DL
KETONES URINE: NEGATIVE
LDLC SERPL CALC-MCNC: 100 MG/DL
LEUKOCYTE ESTERASE URINE: NEGATIVE
LYMPHOCYTES # BLD AUTO: 1.77 K/UL
LYMPHOCYTES NFR BLD AUTO: 32 %
MAN DIFF?: NORMAL
MCHC RBC-ENTMCNC: 27.7 PG
MCHC RBC-ENTMCNC: 30.9 GM/DL
MCV RBC AUTO: 89.6 FL
MONOCYTES # BLD AUTO: 0.39 K/UL
MONOCYTES NFR BLD AUTO: 7.1 %
NEUTROPHILS # BLD AUTO: 3.27 K/UL
NEUTROPHILS NFR BLD AUTO: 59 %
NITRITE URINE: NEGATIVE
NONHDLC SERPL-MCNC: 110 MG/DL
PH URINE: 7
PLATELET # BLD AUTO: 237 K/UL
POTASSIUM SERPL-SCNC: 5.2 MMOL/L
PROT SERPL-MCNC: 7.4 G/DL
PROTEIN URINE: NORMAL
RBC # BLD: 4.52 M/UL
RBC # FLD: 15.3 %
SODIUM SERPL-SCNC: 142 MMOL/L
SPECIFIC GRAVITY URINE: 1.02
T PALLIDUM AB SER QL IA: NEGATIVE
TIBC SERPL-MCNC: 432 UG/DL
TRIGL SERPL-MCNC: 47 MG/DL
TSH SERPL-ACNC: 1.27 UIU/ML
UIBC SERPL-MCNC: 354 UG/DL
URATE SERPL-MCNC: 3.8 MG/DL
UROBILINOGEN URINE: NORMAL
VIT B12 SERPL-MCNC: 739 PG/ML
WBC # FLD AUTO: 5.53 K/UL

## 2023-04-17 NOTE — PHYSICAL EXAM
[Normal Sclera/Conjunctiva] : normal sclera/conjunctiva [Supple] : supple [No Edema] : there was no peripheral edema [No Extremity Clubbing/Cyanosis] : no extremity clubbing/cyanosis [No Joint Swelling] : no joint swelling [Coordination Grossly Intact] : coordination grossly intact [Normal Gait] : normal gait [Normal] : affect was normal and insight and judgment were intact [Normal Appearance] : normal in appearance [No Masses] : no palpable masses [No Nipple Discharge] : no nipple discharge [No Axillary Lymphadenopathy] : no axillary lymphadenopathy [de-identified] : varicose veins b/l

## 2023-04-17 NOTE — ASSESSMENT
[FreeTextEntry1] : Annual\par SBIRT negative\par Depression screen negative\par Check comprehensive labs, in office today\par \par Vaccines \par Flu vaccine 11/2022\par TdaP 3/2019\par COvid Booster 11/22\par Shingrix will check if covered.\par \par  \par EKG NSR no acute changes 12/22 in chart\par \par GYN schedule\par  pap 2019\par Mammogram 2019\par Rx given\par Colonoscopy 10/20 \par \par Obesity:\par Discussed implications of obesity.  Patient motivated to lose weight.\par Advised to try Food diary/ exercise tracker.\par increase regular exercise routine\par Check weight weekly\par Decrease portions.\par discussed diet changes\par \par \par Thyroid nodule\par Had US 10 yrs ago.\par \par ? Franco`s Esophagus\par Bx was negative according to Dr Ivory.\par She is scheduled to get Endoscopy in 2 weeks\par She is currently taking Pantoprazole daily.\par CT Abdomen shows 6 mm nodule in lung not changed from imaging 11/2020\par States that was done in [place].\par For chest pain. No symptoms\par 0-4 pp year Hx quit smoking 1990.\par GE junction thickening\par Liver lesion unchanged from 2020\par Scheduled for endoscopy in 2 weeks\par \par Knee pain saw Dr Shelton\par Surgery went well, left knee replacement.\par \par memory changes\par Check labs\par States since she had general anesthesia in December and notices she has short term memory loss.\par Her boss ( Dentist Dr Cole Lafleur)  mentioned it when she got back after surgery. She thinks because she had ketamine for anesthesia it caused a problem.\par \par Prev Hx: \par Chronic back pain/ HNP/ DDD/ Spondylosis\par Taking Ibuprofen advised not a goo d option given hx of PUD\par Xray ordered\par Hand numbness: Xray cervical spine\par PT did not go.\par \par Follow up in 2-3 months\par

## 2023-04-17 NOTE — HEALTH RISK ASSESSMENT
[Good] : ~his/her~  mood as  good [Yes] : Yes [2 - 4 times a month (2 pts)] : 2-4 times a month (2 points) [1 or 2 (0 pts)] : 1 or 2 (0 points) [Never (0 pts)] : Never (0 points) [No] : In the past 12 months have you used drugs other than those required for medical reasons? No [0] : 2) Feeling down, depressed, or hopeless: Not at all (0) [PHQ-2 Negative - No further assessment needed] : PHQ-2 Negative - No further assessment needed [Patient reported mammogram was normal] : Patient reported mammogram was normal [Patient reported PAP Smear was normal] : Patient reported PAP Smear was normal [Patient reported bone density results were normal] : Patient reported bone density results were normal [Patient reported colonoscopy was normal] : Patient reported colonoscopy was normal [HIV Test offered] : HIV Test offered [Hepatitis C test offered] : Hepatitis C test offered [With Family] : lives with family [Employed] : employed [] :  [Feels Safe at Home] : Feels safe at home [Smoke Detector] : smoke detector [Carbon Monoxide Detector] : carbon monoxide detector [Seat Belt] :  uses seat belt [With Patient/Caregiver] : , with patient/caregiver [Designated Healthcare Proxy] : Designated healthcare proxy [Name: ___] : Health Care Proxy's Name: [unfilled]  [Relationship: ___] : Relationship: [unfilled] [Behavioral] : behavioral [Reviewed no changes] : Reviewed, no changes [Former] : Former [0-4] : 0-4 [Audit-CScore] : 2 [RHD7Lfpvd] : 0 [Reports changes in vision] : Reports no changes in vision [Reports changes in hearing] : Reports no changes in hearing [Reports changes in dental health] : Reports no changes in dental health [TB Exposure] : is not being exposed to tuberculosis [MammogramDate] : 1/2019 [PapSmearDate] : 1/2019 [ColonoscopyDate] : 10/2020 [BoneDensityDate] : 1/2020 [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

## 2023-04-17 NOTE — HISTORY OF PRESENT ILLNESS
[FreeTextEntry1] : annual [de-identified] : Ms. DAYSI LIU is a 62 year old female here for an annaul\par States since she had general anesthesia in December and notices she has short term memory loss.\par Her boss mentioned it when she got back after surgery. She thinks because she had ketamine for anesthesia it caused a problem\par \par Seen by Cardiologist BP high increased Amlodipine\par Lost 18 lbs since last visit trying\par Lacks energy, feels it is difficult to catch up at work. Low mood.\par Pain mid back for 3-4 years constant for 1 year.\par Standing and laying on back makes it better. Sitting makes it worse.\par No PT. Had x ray done HNP, Spondylosis. Done at [facility] a year ago 8/20.\par States that she also has numbness in the hands.\par tried gabapentin previously helped a little but can only take it at night.\par \par Gastric Ulcer/ Hiatal hernia had repair Dr Gallagher.\par Dr Edwards did her endoscopy. Dr Cisneros GI doctor recent visit.\par She is currently on Pantoprazole.\par HTN on Valsartan and Amlodipine, states BP fluctuates.\par \par

## 2023-04-17 NOTE — COUNSELING
[Potential consequences of obesity discussed] : Potential consequences of obesity discussed [Benefits of weight loss discussed] : Benefits of weight loss discussed [Encouraged to maintain food diary] : Encouraged to maintain food diary [Encouraged to increase physical activity] : Encouraged to increase physical activity [Encouraged to use exercise tracking device] : Encouraged to use exercise tracking device [Weigh Self Weekly] : weigh self weekly [Decrease Portions] : decrease portions [____ min/wk Activity] : [unfilled] min/wk activity [Keep Food Diary] : keep food diary [Needs reinforcement, provided] : Patient needs reinforcement on understanding of disease, goals and obesity follow-up plan; reinforcement was provided [FreeTextEntry3] : bicycle 15 mins, discssed for 15 minutes [FreeTextEntry4] : 15

## 2023-04-18 LAB — HEMOCCULT STL QL IA: NEGATIVE

## 2023-04-21 ENCOUNTER — APPOINTMENT (OUTPATIENT)
Dept: ULTRASOUND IMAGING | Facility: CLINIC | Age: 63
End: 2023-04-21
Payer: COMMERCIAL

## 2023-04-21 ENCOUNTER — RESULT REVIEW (OUTPATIENT)
Age: 63
End: 2023-04-21

## 2023-04-21 ENCOUNTER — APPOINTMENT (OUTPATIENT)
Dept: MAMMOGRAPHY | Facility: CLINIC | Age: 63
End: 2023-04-21
Payer: COMMERCIAL

## 2023-04-21 ENCOUNTER — OUTPATIENT (OUTPATIENT)
Dept: OUTPATIENT SERVICES | Facility: HOSPITAL | Age: 63
LOS: 1 days | End: 2023-04-21
Payer: COMMERCIAL

## 2023-04-21 DIAGNOSIS — Z98.890 OTHER SPECIFIED POSTPROCEDURAL STATES: Chronic | ICD-10-CM

## 2023-04-21 DIAGNOSIS — E04.1 NONTOXIC SINGLE THYROID NODULE: ICD-10-CM

## 2023-04-21 DIAGNOSIS — R58 HEMORRHAGE, NOT ELSEWHERE CLASSIFIED: Chronic | ICD-10-CM

## 2023-04-21 DIAGNOSIS — Z12.31 ENCOUNTER FOR SCREENING MAMMOGRAM FOR MALIGNANT NEOPLASM OF BREAST: ICD-10-CM

## 2023-04-21 PROCEDURE — 77067 SCR MAMMO BI INCL CAD: CPT | Mod: 26

## 2023-04-21 PROCEDURE — 77067 SCR MAMMO BI INCL CAD: CPT

## 2023-04-21 PROCEDURE — 77063 BREAST TOMOSYNTHESIS BI: CPT | Mod: 26

## 2023-04-21 PROCEDURE — 77063 BREAST TOMOSYNTHESIS BI: CPT

## 2023-04-21 PROCEDURE — 76536 US EXAM OF HEAD AND NECK: CPT | Mod: 26

## 2023-04-21 PROCEDURE — 76536 US EXAM OF HEAD AND NECK: CPT

## 2023-04-23 ENCOUNTER — TRANSCRIPTION ENCOUNTER (OUTPATIENT)
Age: 63
End: 2023-04-23

## 2023-04-24 ENCOUNTER — TRANSCRIPTION ENCOUNTER (OUTPATIENT)
Age: 63
End: 2023-04-24

## 2023-04-25 ENCOUNTER — RESULT REVIEW (OUTPATIENT)
Age: 63
End: 2023-04-25

## 2023-04-25 ENCOUNTER — OUTPATIENT (OUTPATIENT)
Dept: OUTPATIENT SERVICES | Facility: HOSPITAL | Age: 63
LOS: 1 days | End: 2023-04-25
Payer: COMMERCIAL

## 2023-04-25 ENCOUNTER — APPOINTMENT (OUTPATIENT)
Dept: GASTROENTEROLOGY | Facility: GI CENTER | Age: 63
End: 2023-04-25
Payer: COMMERCIAL

## 2023-04-25 DIAGNOSIS — R93.89 ABNORMAL FINDINGS ON DIAGNOSTIC IMAGING OF OTHER SPECIFIED BODY STRUCTURES: ICD-10-CM

## 2023-04-25 DIAGNOSIS — R14.0 ABDOMINAL DISTENSION (GASEOUS): ICD-10-CM

## 2023-04-25 DIAGNOSIS — R10.13 EPIGASTRIC PAIN: ICD-10-CM

## 2023-04-25 DIAGNOSIS — R58 HEMORRHAGE, NOT ELSEWHERE CLASSIFIED: Chronic | ICD-10-CM

## 2023-04-25 DIAGNOSIS — K22.70 BARRETT'S ESOPHAGUS W/OUT DYSPLASIA: ICD-10-CM

## 2023-04-25 DIAGNOSIS — Z98.890 OTHER SPECIFIED POSTPROCEDURAL STATES: Chronic | ICD-10-CM

## 2023-04-25 DIAGNOSIS — R11.0 NAUSEA: ICD-10-CM

## 2023-04-25 PROCEDURE — 88305 TISSUE EXAM BY PATHOLOGIST: CPT | Mod: 26

## 2023-04-25 PROCEDURE — 88342 IMHCHEM/IMCYTCHM 1ST ANTB: CPT

## 2023-04-25 PROCEDURE — 88305 TISSUE EXAM BY PATHOLOGIST: CPT

## 2023-04-25 PROCEDURE — 88342 IMHCHEM/IMCYTCHM 1ST ANTB: CPT | Mod: 26

## 2023-04-25 PROCEDURE — 43239 EGD BIOPSY SINGLE/MULTIPLE: CPT

## 2023-04-25 NOTE — PHYSICAL EXAM
[Alert] : alert [Normal Voice/Communication] : normal voice/communication [Healthy Appearing] : healthy appearing [No Acute Distress] : no acute distress [Obese (BMI >= 30)] : obese (BMI >= 30) [Sclera] : the sclera and conjunctiva were normal [Hearing Threshold Finger Rub Not [place]] : hearing was normal [Normal Lips/Gums] : the lips and gums were normal [Normal Appearance] : the appearance of the neck was normal [No Respiratory Distress] : no respiratory distress [No Acc Muscle Use] : no accessory muscle use [Respiration, Rhythm And Depth] : normal respiratory rhythm and effort [Auscultation Breath Sounds / Voice Sounds] : lungs were clear to auscultation bilaterally [Heart Rate And Rhythm] : heart rate was normal and rhythm regular [Normal S1, S2] : normal S1 and S2 [Murmurs] : no murmurs [Bowel Sounds] : normal bowel sounds [Abdomen Tenderness] : non-tender [No Masses] : no abdominal mass palpated [Abdomen Soft] : soft [] : no hepatosplenomegaly [Abnormal Walk] : normal gait [Involuntary Movements] : no involuntary movements were seen [Motor Tone] : muscle strength and tone were normal [Normal Color / Pigmentation] : normal skin color and pigmentation [No Focal Deficits] : no focal deficits [Motor Exam] : the motor exam was normal [Oriented To Time, Place, And Person] : oriented to person, place, and time [Normal Affect] : the affect was normal [Normal Mood] : the mood was normal

## 2023-04-28 LAB — SURGICAL PATHOLOGY STUDY: SIGNIFICANT CHANGE UP

## 2023-05-03 ENCOUNTER — NON-APPOINTMENT (OUTPATIENT)
Age: 63
End: 2023-05-03

## 2023-05-13 ENCOUNTER — APPOINTMENT (OUTPATIENT)
Dept: OBGYN | Facility: CLINIC | Age: 63
End: 2023-05-13
Payer: COMMERCIAL

## 2023-05-13 VITALS
HEIGHT: 64 IN | SYSTOLIC BLOOD PRESSURE: 132 MMHG | HEART RATE: 68 BPM | RESPIRATION RATE: 16 BRPM | TEMPERATURE: 97.8 F | DIASTOLIC BLOOD PRESSURE: 74 MMHG | WEIGHT: 180 LBS | BODY MASS INDEX: 30.73 KG/M2

## 2023-05-13 DIAGNOSIS — Z12.39 ENCOUNTER FOR OTHER SCREENING FOR MALIGNANT NEOPLASM OF BREAST: ICD-10-CM

## 2023-05-13 DIAGNOSIS — Z01.419 ENCOUNTER FOR GYNECOLOGICAL EXAMINATION (GENERAL) (ROUTINE) W/OUT ABNORMAL FINDINGS: ICD-10-CM

## 2023-05-13 DIAGNOSIS — Z78.0 ASYMPTOMATIC MENOPAUSAL STATE: ICD-10-CM

## 2023-05-13 DIAGNOSIS — N95.2 POSTMENOPAUSAL ATROPHIC VAGINITIS: ICD-10-CM

## 2023-05-13 DIAGNOSIS — Z13.820 ENCOUNTER FOR SCREENING FOR OSTEOPOROSIS: ICD-10-CM

## 2023-05-13 DIAGNOSIS — Z91.89 OTHER SPECIFIED PERSONAL RISK FACTORS, NOT ELSEWHERE CLASSIFIED: ICD-10-CM

## 2023-05-13 PROCEDURE — 82270 OCCULT BLOOD FECES: CPT

## 2023-05-13 PROCEDURE — 99386 PREV VISIT NEW AGE 40-64: CPT

## 2023-05-13 NOTE — PHYSICAL EXAM
[Appropriately responsive] : appropriately responsive [Alert] : alert [No Acute Distress] : no acute distress [No Lymphadenopathy] : no lymphadenopathy [No Murmurs] : no murmurs [Soft] : soft [Non-tender] : non-tender [No HSM] : No HSM [Non-distended] : non-distended [No Lesions] : no lesions [No Mass] : no mass [Oriented x3] : oriented x3 [Examination Of The Breasts] : a normal appearance [No Masses] : no breast masses were palpable [Labia Minora] : normal [Labia Majora] : normal [Normal] : normal [Uterine Adnexae] : normal [No Tenderness] : no tenderness [Nl Sphincter Tone] : normal sphincter tone [FreeTextEntry9] : -mass-guiac

## 2023-05-13 NOTE — HISTORY OF PRESENT ILLNESS
[FreeTextEntry1] : 63 yo no  bleeding some vaginal dryness discussed REvaree, coconut oil if that doesn’t work vag estrogen.\par  Hx of normal pap its been years she says, needs an MRI for elevated life risk. She has had a dexa but also not in a long time.

## 2023-05-13 NOTE — DISCUSSION/SUMMARY
[FreeTextEntry1] : Discussed with the pt the importance of exercise weight bearing,yoga, aerobics good variety, \par calcium totalling  mg between food and vitamins also vitamin D 2000iu daily, fish oil. \par ALso that any drop of blood after menopause is not good. Encouraged SBE\par FU in one year. \par  Discussed vaginal lubricants OTC like luvena moisturizers,ky ovule and relplense . There are also vaginal estrogens, and coconut oil with intercourse.\par discussed colonoscopy and derma appt.\par mri rx dexa rx

## 2023-05-15 LAB — HPV HIGH+LOW RISK DNA PNL CVX: NOT DETECTED

## 2023-05-18 ENCOUNTER — APPOINTMENT (OUTPATIENT)
Dept: THORACIC SURGERY | Facility: CLINIC | Age: 63
End: 2023-05-18
Payer: COMMERCIAL

## 2023-05-18 VITALS
SYSTOLIC BLOOD PRESSURE: 125 MMHG | BODY MASS INDEX: 30.73 KG/M2 | DIASTOLIC BLOOD PRESSURE: 73 MMHG | WEIGHT: 180 LBS | HEART RATE: 62 BPM | OXYGEN SATURATION: 98 % | HEIGHT: 64 IN | RESPIRATION RATE: 16 BRPM

## 2023-05-18 LAB — CYTOLOGY CVX/VAG DOC THIN PREP: ABNORMAL

## 2023-05-18 PROCEDURE — 99214 OFFICE O/P EST MOD 30 MIN: CPT

## 2023-05-18 NOTE — PHYSICAL EXAM
[] : no respiratory distress [Respiration, Rhythm And Depth] : normal respiratory rhythm and effort [Exaggerated Use Of Accessory Muscles For Inspiration] : no accessory muscle use [Auscultation Breath Sounds / Voice Sounds] : lungs were clear to auscultation bilaterally [Apical Impulse] : the apical impulse was normal [Heart Rate And Rhythm] : heart rate was normal and rhythm regular [Heart Sounds] : normal S1 and S2 [Examination Of The Chest] : the chest was normal in appearance [Bowel Sounds] : normal bowel sounds [Abdomen Tenderness] : non-tender [Involuntary Movements] : no involuntary movements were seen [Oriented To Time, Place, And Person] : oriented to person, place, and time [Affect] : the affect was normal [Mood] : the mood was normal

## 2023-05-19 NOTE — HISTORY OF PRESENT ILLNESS
[FreeTextEntry1] : Ms. DAYSI LIU, 62 year old female, former smoker, w/ hx of HTN, anxiety, sarcoidosis, chronic GERD followed by Dr. Lyle, c/o pain when eating, epigastric pain, bloating, N/V and only eating 1 meal a day. Taking Carafate, Pantoprazole and Zantac but without good relief.\par \par EGD on 8/22/19 showed Franco's esophagus and hiatal hernia at EG junction. Path of EG junction revealed columnar mucosa with no significant histopathology. No intestinal cell metaplasia. Alcian blue/PAS stain is negative. \par \par CT Chest on 10/2/19:\par - several nodules noted in the RLL, largest measuring 7mm\par - stable 4mm vague nodule in the RML along the fissure\par - several stable prominent mediastinal LNs\par - small hiatal hernia\par \par Barium Esophagram on 10/2/19 showed a moderate GE reflux and small hiatal hernia.\par \par Manometry Study on 10/4/19. LES = 20.1 (13-43), UES = 37.7 (); 100% swallows w/ normal amplitude peristalsis; 100% swallows w/ complete bolus clearance by impedence analysis; a 2.4cm hiatal hernia; poor esophageal muscle reserve. Normal motility study.\par \par Now 3.5 years s/p EGD, Laparoscopy Robotic-assisted, repair of hiatal hernia, Nissen Fundoplication on 10/9/19. Path showed adipose tissue and benign LNs. \par \par Of note, patient was referred to Dr. Mal Mitchell to evaluate Franco's esophagus. Patient is s/p EGD bx with Warts on 03/02/2020. Path of esophagus at 36 cm, 37 cm, 35 cm, revealed cardiac mucosa with chronic inflammation, No goblet cells seen. Squamous mucosa with reactive epithelial changes. Warts revealed columnar epithelium with no specific pathological change, Goblet cells not identified. No dysplasia present. Squamous epithelium with no specific pathological change. \par \par Barium Esophagram on 10/20/2020: \par - there is no evidence of a hiatal hernia. No gastroesophageal reflux is noted.\par - post-op changes in the region of the GEJ with communicating barium filled collection in this vicinity likely r/t surgery\par - no mass, stricture, or ulceration. \par \par CT Abdomen w/ contrast on 11/4/2020:\par - stable 7mm RLL nodule\par - small hiatal hernia\par - a 1.8cm Lt upper pole renal cyst\par \par Gastric Emptying study on 12/14/2020 showed normal gastric emptying of liquids and solids; no evidence of gastroparesis.\par \par EGD on 12/17/202 showed a small recurrence of hiatal hernia.\par \par Now 2 yrs 5 mo s/p EGD, Re-do Laparoscopy, Robotic-assisted, extensive lysis of adhesions, repair of recurrent hiatal hernia, Re-do Nissen fundoplication on 12/22/2020. Path showed mesothelial lined fibrous connective tissue c/w hernia sac.\par \par Barium Esophagram on 10/29/2021:\par -  Status post hiatal hernia repair.\par -  No gastroesophageal reflux.\par \par Patient lost f/u since March 2021.\par \par CT Abd/Pelvis w/o contrast on 2/10/23:\par - Two right lower lobe pulmonary nodules with the largest measuring 6 mm without significant change from prior imaging 11/4/2020.\par - Masslike thickening at the GE junction/proximal stomach for which correlation with endoscopy is recommended.\par - Small hypodense focus in the left hepatic lobe stable from prior imaging dating to 2020.\par \par EGD by Dr. Malik Ivory  on 4/25/23 showed a H.H.; EGJ was noted at 37 cm, with hiatal narrowing at 39 cm from the incisors.\par \par Patient is here today for a follow up. She reports epigastric pain on and off, on PPI Bid for reflux, nausea at times; denies vomiting, food regurgitation and choking. \par

## 2023-05-19 NOTE — ASSESSMENT
[FreeTextEntry1] : Ms. DAYSI LIU, 62 year old female, former smoker, w/ hx of HTN, anxiety, sarcoidosis, chronic GERD followed by Dr. Lyle, c/o pain when eating, epigastric pain, bloating, N/V and only eating 1 meal a day. Taking Carafate, Pantoprazole and Zantac but without good relief.\par \par Now 3.5 years s/p EGD, Laparoscopy Robotic-assisted, repair of hiatal hernia, Nissen Fundoplication on 10/9/19. Path showed adipose tissue and benign LNs. \par \par Now 2 yrs 5 mo s/p EGD, Re-do Laparoscopy, Robotic-assisted, extensive lysis of adhesions, repair of recurrent hiatal hernia, Re-do Nissen fundoplication on 12/22/2020. Path showed mesothelial lined fibrous connective tissue c/w hernia sac.\par \par CT Abd/Pelvis w/o contrast on 2/10/23:\par - Two right lower lobe pulmonary nodules with the largest measuring 6 mm without significant change from prior imaging 11/4/2020.\par - Masslike thickening at the GE junction/proximal stomach for which correlation with endoscopy is recommended.\par - Small hypodense focus in the left hepatic lobe stable from prior imaging dating to 2020.\par \par EGD by Dr. Malik Ivory on 4/25/23 showed a H.H.; EGJ was noted at 37 cm, with hiatal narrowing at 39 cm from the incisors.\par \par I have reviewed the patient's medical records and diagnostic images at time of this office consultation and have made the following recommendation:\par 1. Symptomatic recurrent hiatal hernia, will perform EGD on 6/15/23 myself for further evaluation prior to possible surgical repair. \par 2. Upper GI series. \par 3. RTC after above. \par \par 	\par \par I, MALIK Merlos, personally performed the evaluation and management (E/M) services for this established patient who follow up today with an existing condition.  That E/M includes conducting the examination, assessing all new/exacerbated/existing conditions, and establishing a plan of care.  Today, my ACP, Whitney Duran NP, was here to observe my evaluation and management services for this existing condition to be followed going forward.\par \par \par

## 2023-05-19 NOTE — DATA REVIEWED
[FreeTextEntry1] : I have independently reviewed the following:\par CT Abd/Pelvis w/o contrast on 2/10/23\par EGD on 4/25/23

## 2023-05-19 NOTE — CONSULT LETTER
[FreeTextEntry2] : Dr. Renato Lyle (GI/ref)\par Ivana Lentz MD (PCP)\par Jayla Garcia MD (Hem/Onc)  [FreeTextEntry3] : Malik Gallagher MD, MPH \par System Director of Thoracic Surgery \par Director of Comprehensive Lung and Foregut [place] \par Professor Cardiovascular & Thoracic Surgery  \par [facility] [employer] [facility]\par \par [facility]\par [address]\par Oncology [address]\[place] [address]\par Tel: [phone]\par Fax: [phone]\par

## 2023-06-02 ENCOUNTER — APPOINTMENT (OUTPATIENT)
Dept: MRI IMAGING | Facility: CLINIC | Age: 63
End: 2023-06-02
Payer: COMMERCIAL

## 2023-06-02 ENCOUNTER — APPOINTMENT (OUTPATIENT)
Dept: RADIOLOGY | Facility: CLINIC | Age: 63
End: 2023-06-02
Payer: COMMERCIAL

## 2023-06-02 ENCOUNTER — OUTPATIENT (OUTPATIENT)
Dept: OUTPATIENT SERVICES | Facility: HOSPITAL | Age: 63
LOS: 1 days | End: 2023-06-02
Payer: COMMERCIAL

## 2023-06-02 DIAGNOSIS — Z13.820 ENCOUNTER FOR SCREENING FOR OSTEOPOROSIS: ICD-10-CM

## 2023-06-02 DIAGNOSIS — R58 HEMORRHAGE, NOT ELSEWHERE CLASSIFIED: Chronic | ICD-10-CM

## 2023-06-02 DIAGNOSIS — Z98.890 OTHER SPECIFIED POSTPROCEDURAL STATES: Chronic | ICD-10-CM

## 2023-06-02 DIAGNOSIS — Z91.89 OTHER SPECIFIED PERSONAL RISK FACTORS, NOT ELSEWHERE CLASSIFIED: ICD-10-CM

## 2023-06-02 PROCEDURE — 77080 DXA BONE DENSITY AXIAL: CPT

## 2023-06-02 PROCEDURE — 77049 MRI BREAST C-+ W/CAD BI: CPT | Mod: 26

## 2023-06-02 PROCEDURE — 77080 DXA BONE DENSITY AXIAL: CPT | Mod: 26

## 2023-06-02 PROCEDURE — C8937: CPT

## 2023-06-02 PROCEDURE — C8908: CPT

## 2023-06-07 ENCOUNTER — OUTPATIENT (OUTPATIENT)
Dept: OUTPATIENT SERVICES | Facility: HOSPITAL | Age: 63
LOS: 1 days | End: 2023-06-07
Payer: COMMERCIAL

## 2023-06-07 VITALS
OXYGEN SATURATION: 99 % | TEMPERATURE: 98 F | HEART RATE: 61 BPM | RESPIRATION RATE: 16 BRPM | WEIGHT: 182.98 LBS | HEIGHT: 64 IN | DIASTOLIC BLOOD PRESSURE: 76 MMHG | SYSTOLIC BLOOD PRESSURE: 131 MMHG

## 2023-06-07 DIAGNOSIS — I10 ESSENTIAL (PRIMARY) HYPERTENSION: ICD-10-CM

## 2023-06-07 DIAGNOSIS — Z96.652 PRESENCE OF LEFT ARTIFICIAL KNEE JOINT: Chronic | ICD-10-CM

## 2023-06-07 DIAGNOSIS — R58 HEMORRHAGE, NOT ELSEWHERE CLASSIFIED: Chronic | ICD-10-CM

## 2023-06-07 DIAGNOSIS — Z98.890 OTHER SPECIFIED POSTPROCEDURAL STATES: Chronic | ICD-10-CM

## 2023-06-07 DIAGNOSIS — K44.9 DIAPHRAGMATIC HERNIA WITHOUT OBSTRUCTION OR GANGRENE: ICD-10-CM

## 2023-06-07 DIAGNOSIS — K21.9 GASTRO-ESOPHAGEAL REFLUX DISEASE WITHOUT ESOPHAGITIS: ICD-10-CM

## 2023-06-07 LAB
A1C WITH ESTIMATED AVERAGE GLUCOSE RESULT: 5.6 % — SIGNIFICANT CHANGE UP (ref 4–5.6)
ALBUMIN SERPL ELPH-MCNC: 4.6 G/DL — SIGNIFICANT CHANGE UP (ref 3.3–5)
ALP SERPL-CCNC: 90 U/L — SIGNIFICANT CHANGE UP (ref 40–120)
ALT FLD-CCNC: 15 U/L — SIGNIFICANT CHANGE UP (ref 4–33)
ANION GAP SERPL CALC-SCNC: 13 MMOL/L — SIGNIFICANT CHANGE UP (ref 7–14)
AST SERPL-CCNC: 17 U/L — SIGNIFICANT CHANGE UP (ref 4–32)
BILIRUB SERPL-MCNC: <0.2 MG/DL — SIGNIFICANT CHANGE UP (ref 0.2–1.2)
BUN SERPL-MCNC: 17 MG/DL — SIGNIFICANT CHANGE UP (ref 7–23)
CALCIUM SERPL-MCNC: 9.5 MG/DL — SIGNIFICANT CHANGE UP (ref 8.4–10.5)
CHLORIDE SERPL-SCNC: 105 MMOL/L — SIGNIFICANT CHANGE UP (ref 98–107)
CO2 SERPL-SCNC: 22 MMOL/L — SIGNIFICANT CHANGE UP (ref 22–31)
CREAT SERPL-MCNC: 0.58 MG/DL — SIGNIFICANT CHANGE UP (ref 0.5–1.3)
EGFR: 101 ML/MIN/1.73M2 — SIGNIFICANT CHANGE UP
ESTIMATED AVERAGE GLUCOSE: 114 — SIGNIFICANT CHANGE UP
GLUCOSE SERPL-MCNC: 81 MG/DL — SIGNIFICANT CHANGE UP (ref 70–99)
HCT VFR BLD CALC: 40.7 % — SIGNIFICANT CHANGE UP (ref 34.5–45)
HGB BLD-MCNC: 12.9 G/DL — SIGNIFICANT CHANGE UP (ref 11.5–15.5)
MCHC RBC-ENTMCNC: 27.4 PG — SIGNIFICANT CHANGE UP (ref 27–34)
MCHC RBC-ENTMCNC: 31.7 GM/DL — LOW (ref 32–36)
MCV RBC AUTO: 86.6 FL — SIGNIFICANT CHANGE UP (ref 80–100)
NRBC # BLD: 0 /100 WBCS — SIGNIFICANT CHANGE UP (ref 0–0)
NRBC # FLD: 0 K/UL — SIGNIFICANT CHANGE UP (ref 0–0)
PLATELET # BLD AUTO: 235 K/UL — SIGNIFICANT CHANGE UP (ref 150–400)
POTASSIUM SERPL-MCNC: 4.3 MMOL/L — SIGNIFICANT CHANGE UP (ref 3.5–5.3)
POTASSIUM SERPL-SCNC: 4.3 MMOL/L — SIGNIFICANT CHANGE UP (ref 3.5–5.3)
PROT SERPL-MCNC: 7.3 G/DL — SIGNIFICANT CHANGE UP (ref 6–8.3)
RBC # BLD: 4.7 M/UL — SIGNIFICANT CHANGE UP (ref 3.8–5.2)
RBC # FLD: 14.3 % — SIGNIFICANT CHANGE UP (ref 10.3–14.5)
SODIUM SERPL-SCNC: 140 MMOL/L — SIGNIFICANT CHANGE UP (ref 135–145)
WBC # BLD: 7.37 K/UL — SIGNIFICANT CHANGE UP (ref 3.8–10.5)
WBC # FLD AUTO: 7.37 K/UL — SIGNIFICANT CHANGE UP (ref 3.8–10.5)

## 2023-06-07 PROCEDURE — 93010 ELECTROCARDIOGRAM REPORT: CPT

## 2023-06-07 RX ORDER — OXYCODONE HYDROCHLORIDE 5 MG/1
1 TABLET ORAL
Qty: 0 | Refills: 0 | DISCHARGE

## 2023-06-07 RX ORDER — SODIUM CHLORIDE 9 MG/ML
1000 INJECTION, SOLUTION INTRAVENOUS
Refills: 0 | Status: DISCONTINUED | OUTPATIENT
Start: 2023-06-15 | End: 2023-06-29

## 2023-06-07 RX ORDER — SENNA PLUS 8.6 MG/1
2 TABLET ORAL
Qty: 0 | Refills: 0 | DISCHARGE

## 2023-06-07 RX ORDER — ACETAMINOPHEN 500 MG
3 TABLET ORAL
Qty: 0 | Refills: 0 | DISCHARGE

## 2023-06-07 RX ORDER — ASPIRIN/CALCIUM CARB/MAGNESIUM 324 MG
1 TABLET ORAL
Qty: 0 | Refills: 0 | DISCHARGE

## 2023-06-07 RX ORDER — TRAMADOL HYDROCHLORIDE 50 MG/1
1 TABLET ORAL
Qty: 0 | Refills: 0 | DISCHARGE

## 2023-06-07 NOTE — H&P PST ADULT - OTHER CARE PROVIDERS
cardiologist  Dr. Rodriguez --t to call with number cardiologist  Dr. Rodriguez - pt to call with number cardiologist  Dr. Venugopal Palla [phone]

## 2023-06-07 NOTE — H&P PST ADULT - PROBLEM SELECTOR PLAN 2
Await stress test and echo from cardiologist--to call with MD's phone number Await stress test and echo from cardiologist--to call with MD's phone number  * Instructed to take normal am dose of valsartan and pantoprazole the am of surgery

## 2023-06-07 NOTE — H&P PST ADULT - PROBLEM SELECTOR PLAN 1
This is a 62 y/o female who is scheduled for esophagogastroduodenoscopy on 6-15-23  * Given preop instructions

## 2023-06-07 NOTE — H&P PST ADULT - NSICDXPASTSURGICALHX_GEN_ALL_CORE_FT
PAST SURGICAL HISTORY:  Bleeding uterine ablation    H/O thyroid cyst s/p removal     History of Nissen fundoplication 10/2019    History of repair of hiatal hernia 2019    S/P bilateral breast reduction     S/P  Section X2    S/P total knee arthroplasty, left

## 2023-06-07 NOTE — H&P PST ADULT - HISTORY OF PRESENT ILLNESS
This is a 64 y/o female who presents with diaphragmatic hernia with out obstruction and Franco's esophagus. Has h/o hiatal hernia repair and Nissen fundoplication.  Evaluated by MD who recommended intervention. Scheduled for esophagogastroduodenoscopy  This is a 64 y/o female who presents with diaphragmatic hernia with out obstruction and Franco's esophagus. Has h/o hiatal hernia repair, Nissen fundoplication.  Evaluated by MD who recommended intervention. Scheduled for esophagogastroduodenoscopy

## 2023-06-07 NOTE — H&P PST ADULT - NSICDXPASTMEDICALHX_GEN_ALL_CORE_FT
PAST MEDICAL HISTORY:  Anxiety and depression     Franco's esophagus     COVID-19 virus detected 2020 with mild symptoms    Diaphragmatic hernia     Esophageal ulcer     GERD (gastroesophageal reflux disease) Resolved    H/O hiatal hernia     HTN (hypertension)     Miscarriage x 2    Moderate anxiety     OA (osteoarthritis)     Prediabetes

## 2023-06-09 ENCOUNTER — OUTPATIENT (OUTPATIENT)
Dept: OUTPATIENT SERVICES | Facility: HOSPITAL | Age: 63
LOS: 1 days | End: 2023-06-09
Payer: COMMERCIAL

## 2023-06-09 ENCOUNTER — RESULT REVIEW (OUTPATIENT)
Age: 63
End: 2023-06-09

## 2023-06-09 ENCOUNTER — APPOINTMENT (OUTPATIENT)
Dept: RADIOLOGY | Facility: HOSPITAL | Age: 63
End: 2023-06-09

## 2023-06-09 DIAGNOSIS — Z98.890 OTHER SPECIFIED POSTPROCEDURAL STATES: Chronic | ICD-10-CM

## 2023-06-09 DIAGNOSIS — K44.9 DIAPHRAGMATIC HERNIA WITHOUT OBSTRUCTION OR GANGRENE: ICD-10-CM

## 2023-06-09 DIAGNOSIS — Z96.652 PRESENCE OF LEFT ARTIFICIAL KNEE JOINT: Chronic | ICD-10-CM

## 2023-06-09 DIAGNOSIS — R58 HEMORRHAGE, NOT ELSEWHERE CLASSIFIED: Chronic | ICD-10-CM

## 2023-06-09 PROCEDURE — 74240 X-RAY XM UPR GI TRC 1CNTRST: CPT | Mod: 26

## 2023-06-14 ENCOUNTER — TRANSCRIPTION ENCOUNTER (OUTPATIENT)
Age: 63
End: 2023-06-14

## 2023-06-15 ENCOUNTER — OUTPATIENT (OUTPATIENT)
Dept: OUTPATIENT SERVICES | Facility: HOSPITAL | Age: 63
LOS: 1 days | Discharge: HOME | End: 2023-06-15
Payer: COMMERCIAL

## 2023-06-15 ENCOUNTER — TRANSCRIPTION ENCOUNTER (OUTPATIENT)
Age: 63
End: 2023-06-15

## 2023-06-15 ENCOUNTER — APPOINTMENT (OUTPATIENT)
Dept: THORACIC SURGERY | Facility: HOSPITAL | Age: 63
End: 2023-06-15

## 2023-06-15 ENCOUNTER — RESULT REVIEW (OUTPATIENT)
Age: 63
End: 2023-06-15

## 2023-06-15 VITALS
HEIGHT: 64 IN | TEMPERATURE: 97 F | SYSTOLIC BLOOD PRESSURE: 151 MMHG | HEART RATE: 73 BPM | OXYGEN SATURATION: 100 % | WEIGHT: 179.9 LBS | DIASTOLIC BLOOD PRESSURE: 80 MMHG | RESPIRATION RATE: 17 BRPM

## 2023-06-15 VITALS
OXYGEN SATURATION: 98 % | RESPIRATION RATE: 18 BRPM | DIASTOLIC BLOOD PRESSURE: 70 MMHG | SYSTOLIC BLOOD PRESSURE: 133 MMHG | HEART RATE: 71 BPM

## 2023-06-15 DIAGNOSIS — Z98.890 OTHER SPECIFIED POSTPROCEDURAL STATES: Chronic | ICD-10-CM

## 2023-06-15 DIAGNOSIS — K44.9 DIAPHRAGMATIC HERNIA WITHOUT OBSTRUCTION OR GANGRENE: ICD-10-CM

## 2023-06-15 DIAGNOSIS — Z96.652 PRESENCE OF LEFT ARTIFICIAL KNEE JOINT: Chronic | ICD-10-CM

## 2023-06-15 DIAGNOSIS — R58 HEMORRHAGE, NOT ELSEWHERE CLASSIFIED: Chronic | ICD-10-CM

## 2023-06-15 PROCEDURE — 43239 EGD BIOPSY SINGLE/MULTIPLE: CPT

## 2023-06-15 PROCEDURE — 88305 TISSUE EXAM BY PATHOLOGIST: CPT | Mod: 26

## 2023-06-15 RX ORDER — VALSARTAN 80 MG/1
1 TABLET ORAL
Qty: 0 | Refills: 0 | DISCHARGE

## 2023-06-15 RX ORDER — PANTOPRAZOLE SODIUM 20 MG/1
1 TABLET, DELAYED RELEASE ORAL
Qty: 0 | Refills: 0 | DISCHARGE

## 2023-06-15 RX ORDER — VENLAFAXINE HCL 75 MG
1 CAPSULE, EXT RELEASE 24 HR ORAL
Qty: 0 | Refills: 0 | DISCHARGE

## 2023-06-15 RX ORDER — AMLODIPINE BESYLATE 2.5 MG/1
1 TABLET ORAL
Qty: 0 | Refills: 0 | DISCHARGE

## 2023-06-15 RX ORDER — PANTOPRAZOLE SODIUM 20 MG/1
1 TABLET, DELAYED RELEASE ORAL
Qty: 21 | Refills: 0
Start: 2023-06-15 | End: 2023-07-05

## 2023-06-15 RX ORDER — SUCRALFATE 1 G
1 TABLET ORAL
Qty: 84 | Refills: 0
Start: 2023-06-15 | End: 2023-07-05

## 2023-06-15 NOTE — BRIEF OPERATIVE NOTE - OPERATION/FINDINGS
EGD  Z line at 37cm from the incisors, diaphragmatic pinch at 39cm from the incisors  Biopsies at 37cm from the incisors and of the gastric cardia were taking with forceps

## 2023-06-19 PROBLEM — K44.9 DIAPHRAGMATIC HERNIA WITHOUT OBSTRUCTION OR GANGRENE: Chronic | Status: ACTIVE | Noted: 2023-06-07

## 2023-06-19 PROBLEM — F41.9 ANXIETY DISORDER, UNSPECIFIED: Chronic | Status: ACTIVE | Noted: 2023-06-07

## 2023-06-19 PROBLEM — R73.03 PREDIABETES: Chronic | Status: ACTIVE | Noted: 2023-06-07

## 2023-06-20 LAB — SURGICAL PATHOLOGY STUDY: SIGNIFICANT CHANGE UP

## 2023-06-29 ENCOUNTER — APPOINTMENT (OUTPATIENT)
Dept: THORACIC SURGERY | Facility: CLINIC | Age: 63
End: 2023-06-29
Payer: COMMERCIAL

## 2023-06-29 VITALS
HEART RATE: 70 BPM | OXYGEN SATURATION: 97 % | DIASTOLIC BLOOD PRESSURE: 77 MMHG | WEIGHT: 180 LBS | RESPIRATION RATE: 17 BRPM | SYSTOLIC BLOOD PRESSURE: 135 MMHG | HEIGHT: 64 IN | BODY MASS INDEX: 30.73 KG/M2

## 2023-06-29 PROCEDURE — 99214 OFFICE O/P EST MOD 30 MIN: CPT

## 2023-06-29 RX ORDER — MELOXICAM 15 MG/1
15 TABLET ORAL
Qty: 30 | Refills: 2 | Status: COMPLETED | COMMUNITY
Start: 2023-03-23 | End: 2023-06-29

## 2023-06-29 RX ORDER — PANTOPRAZOLE 40 MG/1
40 TABLET, DELAYED RELEASE ORAL DAILY
Qty: 30 | Refills: 5 | Status: COMPLETED | COMMUNITY
Start: 2023-05-03 | End: 2023-06-29

## 2023-06-29 RX ORDER — PANTOPRAZOLE 40 MG/1
40 TABLET, DELAYED RELEASE ORAL DAILY
Qty: 30 | Refills: 2 | Status: COMPLETED | COMMUNITY
Start: 2022-12-05 | End: 2023-06-29

## 2023-06-29 RX ORDER — HYALURONATE SOD, CROSS-LINKED 30 MG/3 ML
30 SYRINGE (ML) INTRAARTICULAR
Qty: 2 | Refills: 0 | Status: COMPLETED | COMMUNITY
Start: 2022-07-18 | End: 2023-06-29

## 2023-06-30 NOTE — CONSULT LETTER
[Dear  ___] : Dear  [unfilled], [Consult Letter:] : I had the pleasure of evaluating your patient, [unfilled]. [( Thank you for referring [unfilled] for consultation for _____ )] : Thank you for referring [unfilled] for consultation for [unfilled] [Please see my note below.] : Please see my note below. [Consult Closing:] : Thank you very much for allowing me to participate in the care of this patient.  If you have any questions, please do not hesitate to contact me. [Sincerely,] : Sincerely, [DrLan  ___] : Dr. WEAVER [DrLan ___] : Dr. WEAVER [FreeTextEntry2] : Dr. Renato Lyle (GI/ref)\par Ivana Lentz MD (PCP)\par Jayla Garcia MD (Hem/Onc)  [FreeTextEntry3] : Malik Gallagher MD, MPH \par System Director of Thoracic Surgery \par Director of Comprehensive Lung and Foregut [place] \par Professor Cardiovascular & Thoracic Surgery  \par [facility] [employer] [facility] at [facility]\par \par [facility]\par [address]\par Oncology [address]\[place] [address]\par Tel: [phone]\par Fax: [phone]\par

## 2023-06-30 NOTE — DATA REVIEWED
[FreeTextEntry1] : I have independently reviewed the following:\par Upper GI on 6/9/23: Small sliding-type hiatal hernia demonstrated in an otherwise normal upper GI series.\par EGD with biopsy on 6/15/23 showed an intact Nissen fundoplication wrap seen.  Rest of the stomach, pylorus, and duodenum appeared normal.  There was some evidence of gastritis grossly at the cardia.  \par

## 2023-06-30 NOTE — ASSESSMENT
[FreeTextEntry1] : Ms. DAYSI LIU, 63 year old female, former smoker, w/ hx of HTN, anxiety, sarcoidosis, chronic GERD followed by Dr. Lyle, c/o pain when eating, epigastric pain, bloating, N/V and only eating 1 meal a day. Taking Carafate, Pantoprazole and Zantac but without good relief.\par \par Now 3 yrs 9 mo s/p EGD, Laparoscopy Robotic-assisted, repair of hiatal hernia, Nissen Fundoplication on 10/9/19. Path showed adipose tissue and benign LNs. \par \par Now 2.5 yrs s/p EGD, Re-do Laparoscopy, Robotic-assisted, extensive lysis of adhesions, repair of recurrent hiatal hernia, Re-do Nissen fundoplication on 12/22/2020. Path showed mesothelial lined fibrous connective tissue c/w hernia sac.\par \par Upper GI on 6/9/23: Small sliding-type hiatal hernia demonstrated in an otherwise normal upper GI series.\par \par Now 2wks s/p EGD with biopsy on 6/15/23 showed an intact Nissen fundoplication wrap seen.  Rest of the stomach, pylorus, and duodenum appeared normal.  There was some evidence of gastritis grossly at the cardia.  \par Stomach, cardia, biopsy:  Gastric cardia type mucosa with chronic inactive gastritis; No H. Pylori; No evidence of intestinal metaplasia.\par Esophagus, 37 cm, biopsy:  GE junctional mucosa with changes c/w reflux; No evidence of intestinal metaplasia.\par \par I have reviewed the patient's medical records and diagnostic images at time of this office consultation and have made the following recommendation:\par 1. EGD showed intact fundoplication, but upper GI showed a small sliding hiatal hernia, no surgery indicated at this time, but given her s/s, I recommended an Manometry and ZAMORA Study with Dr. Verito Ramos.\par 2. RTC after above.\par \par \par I, JESICA Merlos, personally performed the evaluation and management (E/M) services for this established patient who presents today with (a) new problem(s)/exacerbation of (an) existing condition(s). That E/M includes conducting the examination, assessing all new/exacerbated conditions, and establishing a new plan of care. Today, my ACP, Meek Jackman NP was here to observe my evaluation and management services for this new problem/exacerbated condition to be followed going forward.\par \par

## 2023-06-30 NOTE — HISTORY OF PRESENT ILLNESS
[FreeTextEntry1] : Ms. DAYSI LIU, 63 year old female, former smoker, w/ hx of HTN, anxiety, sarcoidosis, chronic GERD followed by Dr. Lyle, c/o pain when eating, epigastric pain, bloating, N/V and only eating 1 meal a day. Taking Carafate, Pantoprazole and Zantac but without good relief.\par \par EGD on 8/22/19 showed Franco's esophagus and hiatal hernia at EG junction. Path of EG junction revealed columnar mucosa with no significant histopathology. No intestinal cell metaplasia. Alcian blue/PAS stain is negative. \par \par CT Chest on 10/2/19:\par - several nodules noted in the RLL, largest measuring 7mm\par - stable 4mm vague nodule in the RML along the fissure\par - several stable prominent mediastinal LNs\par - small hiatal hernia\par \par Barium Esophagram on 10/2/19 showed a moderate GE reflux and small hiatal hernia.\par \par Manometry Study on 10/4/19. LES = 20.1 (13-43), UES = 37.7 (); 100% swallows w/ normal amplitude peristalsis; 100% swallows w/ complete bolus clearance by impedence analysis; a 2.4cm hiatal hernia; poor esophageal muscle reserve. Normal motility study.\par \par Now 3 yrs 9 mo s/p EGD, Laparoscopy Robotic-assisted, repair of hiatal hernia, Nissen Fundoplication on 10/9/19. Path showed adipose tissue and benign LNs. \par \par Of note, patient was referred to Dr. Mal Mitchell to evaluate Franco's esophagus. Patient is s/p EGD bx with Warts on 03/02/2020. Path of esophagus at 36 cm, 37 cm, 35 cm, revealed cardiac mucosa with chronic inflammation, No goblet cells seen. Squamous mucosa with reactive epithelial changes. Warts revealed columnar epithelium with no specific pathological change, Goblet cells not identified. No dysplasia present. Squamous epithelium with no specific pathological change. \par \par Barium Esophagram on 10/20/2020: \par - there is no evidence of a hiatal hernia. No gastroesophageal reflux is noted.\par - post-op changes in the region of the GEJ with communicating barium filled collection in this vicinity likely r/t surgery\par - no mass, stricture, or ulceration. \par \par CT Abdomen w/ contrast on 11/4/2020:\par - stable 7mm RLL nodule\par - small hiatal hernia\par - a 1.8cm Lt upper pole renal cyst\par \par Gastric Emptying study on 12/14/2020 showed normal gastric emptying of liquids and solids; no evidence of gastroparesis.\par \par EGD on 12/17/202 showed a small recurrence of hiatal hernia.\par \par Now 2.5 yrs s/p EGD, Re-do Laparoscopy, Robotic-assisted, extensive lysis of adhesions, repair of recurrent hiatal hernia, Re-do Nissen fundoplication on 12/22/2020. Path showed mesothelial lined fibrous connective tissue c/w hernia sac.\par \par Barium Esophagram on 10/29/2021:\par -  Status post hiatal hernia repair.\par -  No gastroesophageal reflux.\par \par Patient lost f/u since March 2021.\par \par CT Abd/Pelvis w/o contrast on 2/10/23:\par - Two right lower lobe pulmonary nodules with the largest measuring 6 mm without significant change from prior imaging 11/4/2020.\par - Masslike thickening at the GE junction/proximal stomach for which correlation with endoscopy is recommended.\par - Small hypodense focus in the left hepatic lobe stable from prior imaging dating to 2020.\par \par EGD by Dr. Malik Ivory  on 4/25/23 showed a H.H.; EGJ was noted at 37 cm, with hiatal narrowing at 39 cm from the incisors.\par \par Upper GI on 6/9/23: Small sliding-type hiatal hernia demonstrated in an otherwise normal upper GI series.\par \par Now 2wks s/p EGD with biopsy on 6/15/23 showed an intact Nissen fundoplication wrap seen.  Rest of the stomach, pylorus, and duodenum appeared normal.  There was some evidence of gastritis grossly at the cardia.  \par Stomach, cardia, biopsy:  Gastric cardia type mucosa with chronic inactive gastritis; No H. Pylori; No evidence of intestinal metaplasia.\par Esophagus, 37 cm, biopsy:  GE junctional mucosa with changes c/w reflux; No evidence of intestinal metaplasia.\par \par Patient is here today for a follow up. Still states that she felt pain when swallowing and feeling of food getting stuck.\par

## 2023-07-05 ENCOUNTER — NON-APPOINTMENT (OUTPATIENT)
Age: 63
End: 2023-07-05

## 2023-07-19 ENCOUNTER — APPOINTMENT (OUTPATIENT)
Dept: GASTROENTEROLOGY | Facility: CLINIC | Age: 63
End: 2023-07-19
Payer: COMMERCIAL

## 2023-07-19 DIAGNOSIS — R13.10 DYSPHAGIA, UNSPECIFIED: ICD-10-CM

## 2023-07-19 PROCEDURE — 99202 OFFICE O/P NEW SF 15 MIN: CPT | Mod: 95

## 2023-07-19 NOTE — HISTORY OF PRESENT ILLNESS
[Home] : at home, [unfilled] , at the time of the visit. [Other Location: e.g. Home (Enter Location, City,State)___] : at [unfilled] [Verbal consent obtained from patient] : the patient, [unfilled] [FreeTextEntry1] : 63 year old former smoker female with PMH of HTN, sarcoidosis, anxiety, and GERD referred by Dr. Malik Gallagher for esophageal manometry and EGD/BRAVO.\par Patient underwent hiatal hernia repair with NIssen fundoplication on 10/9/2019, and then extensive MELISSA, repair of recurrent hiatal hernia and re-do Nissen fundoplication on 12/22/2020. She has a history of Franco's esophagus found on EGD in 2019 prior to her original surgery.\par Patient currently describing odynophagia and the feeling of solid food/pills getting stuck. The odynophagia has been ongoing and carafate does provide her some relief. THe feeling of food getting stuck presented in the last 2 months. She takes pantoprazole once daily in the morning without significant relief. Denies any typical reflux symptoms such as heartburn, chest pain, or regurgitation.\par UGIS on 6/9/2023 showed a small sliding-type hiatal hernia but otherwise normal.\par Recent EGD on 6/15/2023 showed an intact Nissen fundoplication wrap, some gastritis at the cardia but otherwise normal appearing stomach, pylorus, and duodenum.

## 2023-07-19 NOTE — ASSESSMENT
[FreeTextEntry1] : 63 year old former smoker female with PMH of HTN, sarcoidosis, anxiety, and BE/GERD referred by Dr. Malik Gallagher for esophageal manometry and EGD/BRAVO due to odynophagia and dysphagia.\par Patient is s/p hiatal hernia repair with Nissen fundoplication on 10/9/2019 and then extensive MELISSA, repair of recurrent hiatal hernia and re-do Nissen fundoplication on 12/22/2020. \par Discussed manometry procedure with patient. She underwent manometry prior to her original surgery and is therefore familiar. Discussed EGD/BRAVO with patient. Also described  the 24 hour pH study as an alternative given she has undergone EGD a month ago and because she does not describe typical reflux symptoms. Patient is not comfortable leaving with a transnasal catheter in place x24 hours. As such, we will proceed with EGD/BRAVO for reflux testing. She understands this will be off antacid therapy and that she will need to discontinue her pantoprazole and carafate prior to procedure. She is also aware this will require an additional trip back to the hospital to return the BRAVO monitor at the conclusion of the study.\par \par 1)  Schedule esophageal manometry\par Discussed Esophageal manometry procedure in detail with patient. Patient instructed to remain NPO for at least 8 hours prior to the test. Patient instructed to avoid taking any prokinetic agents, muscle relaxants, opiate pain medications, Valium, Xanax, Ativan,etc.\par The risks, benefits and alternatives of the Esophageal Manometry procedure were discussed in detail with the patient. Risks includes nasal irritation, bleeding, coughing, sore throat and sinusitis. Patient advised that an escort is not required as no anesthesia is used in this procedure. Patient was made aware that the procedure is performed by an NP/PA and interpreted at a later date by the physician. All questions were answered. The patient expressed understanding of these risks and is agreeable to proceed. The  to confirm appointment with patient.\par \par 2) Schedule EGD/BRAVO off antacid therapy\par The risks, benefits and alternatives EGD/BRAVO were discussed with the patient in detail. Risks include bleeding, infection, bowel perforation, dysphagia and chest discomfort from the BRAVO capsule. The patient was also instructed that an MRI is not permitted within 30 days after BRAVO is placed, unless there is confirmed expulsion of the capsule. I instructed the patient that the study is done off therapy and instructed to stop PPI 7 days prior to the test, H2 blocker 3 days prior to test, and antacids 24 hours prior to test. Patient was also instructed to bring the recorder back to the  at endo suite once the study is completed. All questions were answered. The patient expressed understanding of these risks and is agreeable to proceed. The  to confirm appointment with patient.\par

## 2023-07-28 ENCOUNTER — APPOINTMENT (OUTPATIENT)
Dept: FAMILY MEDICINE | Facility: CLINIC | Age: 63
End: 2023-07-28
Payer: COMMERCIAL

## 2023-07-28 VITALS
OXYGEN SATURATION: 96 % | DIASTOLIC BLOOD PRESSURE: 77 MMHG | RESPIRATION RATE: 17 BRPM | SYSTOLIC BLOOD PRESSURE: 141 MMHG | HEIGHT: 64 IN | HEART RATE: 76 BPM | BODY MASS INDEX: 32.95 KG/M2 | TEMPERATURE: 97.8 F | WEIGHT: 193 LBS

## 2023-07-28 PROCEDURE — 99214 OFFICE O/P EST MOD 30 MIN: CPT

## 2023-07-28 NOTE — ASSESSMENT
[FreeTextEntry1] : Cervicalgia\par Meloxicam/ Methocarbamol (cautioned about sedation)\par Stretching exercises/ avoid heavy lifting\par Stopped working 4 months ago has been doing a lot of house work\par \par HTN\par Stay on Amlodipine 10 mg / valsartan 320 mg.\par Weight loss, low salt\par Obesity\par Discussed implications of obesity.  Patient motivated to lose weight.\par Food diary\par regular exercise routine\par Check weight weekly\par Decrease portions.\par \par Follow up in 4 weeks.

## 2023-07-28 NOTE — PHYSICAL EXAM
[Normal Sclera/Conjunctiva] : normal sclera/conjunctiva [Supple] : supple [No Edema] : there was no peripheral edema [No Extremity Clubbing/Cyanosis] : no extremity clubbing/cyanosis [Normal Appearance] : normal in appearance [No Masses] : no palpable masses [No Nipple Discharge] : no nipple discharge [No Axillary Lymphadenopathy] : no axillary lymphadenopathy [No Joint Swelling] : no joint swelling [Coordination Grossly Intact] : coordination grossly intact [Normal Gait] : normal gait [Normal] : affect was normal and insight and judgment were intact [de-identified] : muscle tightness trapezius b/l [de-identified] : varicose veins b/l

## 2023-07-28 NOTE — HISTORY OF PRESENT ILLNESS
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

## 2023-08-10 ENCOUNTER — APPOINTMENT (OUTPATIENT)
Dept: NEUROLOGY | Facility: CLINIC | Age: 63
End: 2023-08-10
Payer: COMMERCIAL

## 2023-08-10 ENCOUNTER — NON-APPOINTMENT (OUTPATIENT)
Age: 63
End: 2023-08-10

## 2023-08-10 VITALS
SYSTOLIC BLOOD PRESSURE: 128 MMHG | DIASTOLIC BLOOD PRESSURE: 70 MMHG | BODY MASS INDEX: 32.44 KG/M2 | WEIGHT: 190 LBS | HEIGHT: 64 IN

## 2023-08-10 DIAGNOSIS — R41.3 OTHER AMNESIA: ICD-10-CM

## 2023-08-10 PROCEDURE — 99214 OFFICE O/P EST MOD 30 MIN: CPT

## 2023-08-10 RX ORDER — METHOCARBAMOL 500 MG/1
500 TABLET, FILM COATED ORAL 3 TIMES DAILY
Qty: 15 | Refills: 0 | Status: COMPLETED | COMMUNITY
Start: 2023-07-28 | End: 2023-08-10

## 2023-08-10 NOTE — HISTORY OF PRESENT ILLNESS
[FreeTextEntry1] : Initial office visit August 10, 2023: This is a 63-year-old woman who presents today with complaint of memory loss.  She states that her short-term memory is failing.  She had episodes where she was introduced to somebody and then forgot their name quickly, reading sentences and books and not remembering what she had read.  She states that she was recently let go from her job because her boss thought she was forgetful.  She is able to drive without getting lost but does use navigation.  She is able to cook without leaving items on the stove top to burn or the oven on.  Her  is always taking care of the finances in the house.  She is here today for neurologic evaluation.

## 2023-08-10 NOTE — PHYSICAL EXAM
[General Appearance - Alert] : alert [General Appearance - Well Nourished] : well nourished [General Appearance - In No Acute Distress] : in no acute distress [General Appearance - Well Developed] : well developed [Person] : oriented to person [Place] : oriented to place [Time] : oriented to time [Short Term Intact] : short term memory intact [Remote Intact] : remote memory intact [Registration Intact] : recent registration memory intact [Span Intact] : the attention span was normal [Concentration Intact] : normal concentrating ability [Visual Intact] : visual attention was ~T not ~L decreased [Naming Objects] : no difficulty naming common objects [Repeating Phrases] : no difficulty repeating a phrase [Fluency] : fluency intact [Comprehension] : comprehension intact [Current Events] : adequate knowledge of current events [Past History] : adequate knowledge of personal past history [Cranial Nerves Optic (II)] : visual acuity intact bilaterally,  visual fields full to confrontation, pupils equal round and reactive to light [Cranial Nerves Oculomotor (III)] : extraocular motion intact [Cranial Nerves Trigeminal (V)] : facial sensation intact symmetrically [Cranial Nerves Facial (VII)] : face symmetrical [Cranial Nerves Vestibulocochlear (VIII)] : hearing was intact bilaterally [Cranial Nerves Glossopharyngeal (IX)] : tongue and palate midline [Cranial Nerves Accessory (XI - Cranial And Spinal)] : head turning and shoulder shrug symmetric [Cranial Nerves Hypoglossal (XII)] : there was no tongue deviation with protrusion [Motor Tone] : muscle tone was normal in all four extremities [Motor Strength] : muscle strength was normal in all four extremities [Involuntary Movements] : no involuntary movements were seen [No Muscle Atrophy] : normal bulk in all four extremities [Paresis Pronator Drift Right-Sided] : no pronator drift on the right [Paresis Pronator Drift Left-Sided] : no pronator drift on the left [Motor Strength Upper Extremities Bilaterally] : strength was normal in both upper extremities [Motor Strength Lower Extremities Bilaterally] : strength was normal in both lower extremities [Sensation Tactile Decrease] : light touch was intact [Sensation Pain / Temperature Decrease] : pain and temperature was intact [Sensation Vibration Decrease] : vibration was intact [Abnormal Walk] : normal gait [Proprioception] : proprioception was intact [Balance] : balance was intact [Tremor] : no tremor present [Coordination - Dysmetria Impaired Finger-to-Nose Bilateral] : not present [2+] : Patella left 2+ [FreeTextEntry4] : 3 out of 3 recall [Sclera] : the sclera and conjunctiva were normal [PERRL With Normal Accommodation] : pupils were equal in size, round, reactive to light, with normal accommodation [Extraocular Movements] : extraocular movements were intact [No APD] : no afferent pupillary defect [No ABRAHAN] : no internuclear ophthalmoplegia [Full Visual Field] : full visual field

## 2023-08-10 NOTE — CONSULT LETTER
[Dear  ___] : Dear  [unfilled], [Consult Letter:] : I had the pleasure of evaluating your patient, [unfilled]. [Please see my note below.] : Please see my note below. [Consult Closing:] : Thank you very much for allowing me to participate in the care of this patient.  If you have any questions, please do not hesitate to contact me. [Sincerely,] : Sincerely, [FreeTextEntry3] : Yovany Low M.D., Ph.D. DPN-[facility] Director of Stroke Services [facility]

## 2023-08-10 NOTE — ASSESSMENT
[FreeTextEntry1] : This is a 63-year-old woman with short-term memory loss.  A lot of it seems to be due to possible distractibility.  However I would like to check a head CT to rule out any intracranial pathology that may be causing this.  Will also do blood work.  Will follow-up with her by phone with these results and if normal see her back in the office in 6 months, sooner should the need arise.  If abnormal we will address it accordingly.

## 2023-08-11 LAB
FOLATE SERPL-MCNC: 18.1 NG/ML
TSH SERPL-ACNC: 1.21 UIU/ML
VIT B12 SERPL-MCNC: 592 PG/ML

## 2023-08-16 LAB — METHYLMALONATE SERPL-SCNC: 120 NMOL/L

## 2023-08-24 ENCOUNTER — OUTPATIENT (OUTPATIENT)
Dept: OUTPATIENT SERVICES | Facility: HOSPITAL | Age: 63
LOS: 1 days | End: 2023-08-24

## 2023-08-24 ENCOUNTER — APPOINTMENT (OUTPATIENT)
Dept: CT IMAGING | Facility: CLINIC | Age: 63
End: 2023-08-24

## 2023-08-24 DIAGNOSIS — R41.3 OTHER AMNESIA: ICD-10-CM

## 2023-08-24 DIAGNOSIS — Z98.890 OTHER SPECIFIED POSTPROCEDURAL STATES: Chronic | ICD-10-CM

## 2023-08-24 DIAGNOSIS — Z96.652 PRESENCE OF LEFT ARTIFICIAL KNEE JOINT: Chronic | ICD-10-CM

## 2023-08-24 DIAGNOSIS — R58 HEMORRHAGE, NOT ELSEWHERE CLASSIFIED: Chronic | ICD-10-CM

## 2023-08-29 ENCOUNTER — RESULT REVIEW (OUTPATIENT)
Age: 63
End: 2023-08-29

## 2023-08-29 ENCOUNTER — OUTPATIENT (OUTPATIENT)
Dept: OUTPATIENT SERVICES | Facility: HOSPITAL | Age: 63
LOS: 1 days | Discharge: HOME | End: 2023-08-29
Payer: COMMERCIAL

## 2023-08-29 ENCOUNTER — APPOINTMENT (OUTPATIENT)
Dept: GASTROENTEROLOGY | Facility: HOSPITAL | Age: 63
End: 2023-08-29
Payer: COMMERCIAL

## 2023-08-29 VITALS
DIASTOLIC BLOOD PRESSURE: 56 MMHG | SYSTOLIC BLOOD PRESSURE: 123 MMHG | RESPIRATION RATE: 14 BRPM | OXYGEN SATURATION: 97 % | HEART RATE: 64 BPM

## 2023-08-29 VITALS — HEIGHT: 63 IN | WEIGHT: 195.11 LBS

## 2023-08-29 DIAGNOSIS — Z98.890 OTHER SPECIFIED POSTPROCEDURAL STATES: Chronic | ICD-10-CM

## 2023-08-29 DIAGNOSIS — Z96.652 PRESENCE OF LEFT ARTIFICIAL KNEE JOINT: Chronic | ICD-10-CM

## 2023-08-29 DIAGNOSIS — R58 HEMORRHAGE, NOT ELSEWHERE CLASSIFIED: Chronic | ICD-10-CM

## 2023-08-29 DIAGNOSIS — K21.9 GASTRO-ESOPHAGEAL REFLUX DISEASE WITHOUT ESOPHAGITIS: ICD-10-CM

## 2023-08-29 PROCEDURE — 91035 G-ESOPH REFLX TST W/ELECTROD: CPT | Mod: 26

## 2023-08-29 PROCEDURE — 43239 EGD BIOPSY SINGLE/MULTIPLE: CPT

## 2023-08-29 PROCEDURE — 88305 TISSUE EXAM BY PATHOLOGIST: CPT | Mod: 26

## 2023-08-29 PROCEDURE — 91037 ESOPH IMPED FUNCTION TEST: CPT | Mod: 26

## 2023-08-29 PROCEDURE — 91010 ESOPHAGUS MOTILITY STUDY: CPT | Mod: 26

## 2023-08-29 DEVICE — IMPLANTABLE DEVICE: Type: IMPLANTABLE DEVICE | Status: FUNCTIONAL

## 2023-08-29 NOTE — ASU PATIENT PROFILE, ADULT - FALL HARM RISK - UNIVERSAL INTERVENTIONS
Bed in lowest position, wheels locked, appropriate side rails in place/Call bell, personal items and telephone in reach/Instruct patient to call for assistance before getting out of bed or chair/Non-slip footwear when patient is out of bed/[place] to call system/Physically safe environment - no spills, clutter or unnecessary equipment/Purposeful Proactive Rounding/Room/bathroom lighting operational, light cord in reach

## 2023-08-29 NOTE — PRE PROCEDURE NOTE - PRE PROCEDURE EVALUATION
Pre-Endoscopy Evaluation    Referring Physician:                                  Dr Malik Gallagher    Indication for Procedure:  fundo, reflux    Sedation by Anesthesia [X ]    PAST MEDICAL & SURGICAL HISTORY:  GERD (gastroesophageal reflux disease)  Resolved      Esophageal ulcer      HTN (hypertension)      H/O hiatal hernia      Miscarriage  x 2      Franco's esophagus      OA (osteoarthritis)      Moderate anxiety      COVID-19 virus detected   with mild symptoms      Anxiety and depression      Prediabetes      Diaphragmatic hernia      S/P  Section  X2      H/O thyroid cyst  s/p removal       S/P bilateral breast reduction      Bleeding  uterine ablation      History of repair of hiatal hernia        History of Nissen fundoplication  10/2019      S/P total knee arthroplasty, left          Latex allergy: [ ] yes [X] no    Smoking: [ ] yes  [X] no    AICD/PPM: [ ] yes   [X] no    Pertinent lab data:                      Physical Examination:  Daily Height in cm: 160.02 (29 Aug 2023 12:54)    Daily   Vital Signs Last 24 Hrs  T(C): 36.7 (29 Aug 2023 12:56), Max: 36.7 (29 Aug 2023 12:56)  T(F): 98.1 (29 Aug 2023 12:56), Max: 98.1 (29 Aug 2023 12:56)  HR: 69 (29 Aug 2023 12:56) (69 - 69)  BP: 156/74 (29 Aug 2023 12:56) (156/74 - 156/74)  BP(mean): --  RR: 18 (29 Aug 2023 12:56) (18 - 18)  SpO2: 98% (29 Aug 2023 12:56) (98% - 98%)    Parameters below as of 29 Aug 2023 12:56  Patient On (Oxygen Delivery Method): room air        Constitutional: NAD    HEENT: PERRLA, EOMI,       Neck:  No JVD    Respiratory: CTAB/L    Cardiovascular: S1 and S2    Gastrointestinal: BS+, soft, NT/ND    Extremities: No peripheral edema    Neurological: A/O x 3, no focal deficits    Psychiatric: Normal mood, normal affect    : No [facility]    Skin: No rashes    Comments:    ASA Class: I [ ]  II [X ]  III [ ]  IV [ ]    The patient is a suitable candidate for the planned procedure unless box checked [ ]  No, explain:

## 2023-08-31 LAB — SURGICAL PATHOLOGY STUDY: SIGNIFICANT CHANGE UP

## 2023-09-15 ENCOUNTER — APPOINTMENT (OUTPATIENT)
Dept: FAMILY MEDICINE | Facility: CLINIC | Age: 63
End: 2023-09-15
Payer: COMMERCIAL

## 2023-09-15 VITALS
RESPIRATION RATE: 15 BRPM | BODY MASS INDEX: 32.95 KG/M2 | TEMPERATURE: 98.1 F | WEIGHT: 193 LBS | HEIGHT: 64 IN | DIASTOLIC BLOOD PRESSURE: 85 MMHG | SYSTOLIC BLOOD PRESSURE: 155 MMHG | OXYGEN SATURATION: 97 % | HEART RATE: 73 BPM

## 2023-09-15 VITALS — SYSTOLIC BLOOD PRESSURE: 138 MMHG | DIASTOLIC BLOOD PRESSURE: 70 MMHG

## 2023-09-15 DIAGNOSIS — G89.29 DORSALGIA, UNSPECIFIED: ICD-10-CM

## 2023-09-15 DIAGNOSIS — E78.5 HYPERLIPIDEMIA, UNSPECIFIED: ICD-10-CM

## 2023-09-15 DIAGNOSIS — M54.9 DORSALGIA, UNSPECIFIED: ICD-10-CM

## 2023-09-15 PROCEDURE — G0008: CPT

## 2023-09-15 PROCEDURE — 90686 IIV4 VACC NO PRSV 0.5 ML IM: CPT

## 2023-09-15 PROCEDURE — G0447 BEHAVIOR COUNSEL OBESITY 15M: CPT | Mod: 59

## 2023-09-15 PROCEDURE — 99214 OFFICE O/P EST MOD 30 MIN: CPT | Mod: 25

## 2023-09-22 ENCOUNTER — TRANSCRIPTION ENCOUNTER (OUTPATIENT)
Age: 63
End: 2023-09-22

## 2023-09-22 LAB
CHOLEST SERPL-MCNC: 205 MG/DL
ESTIMATED AVERAGE GLUCOSE: 123 MG/DL
HBA1C MFR BLD HPLC: 5.9 %
HDLC SERPL-MCNC: 66 MG/DL
LDLC SERPL CALC-MCNC: 113 MG/DL
NONHDLC SERPL-MCNC: 138 MG/DL
TRIGL SERPL-MCNC: 145 MG/DL

## 2023-09-28 ENCOUNTER — APPOINTMENT (OUTPATIENT)
Dept: THORACIC SURGERY | Facility: CLINIC | Age: 63
End: 2023-09-28
Payer: COMMERCIAL

## 2023-09-28 VITALS
SYSTOLIC BLOOD PRESSURE: 155 MMHG | OXYGEN SATURATION: 97 % | HEART RATE: 75 BPM | BODY MASS INDEX: 32.44 KG/M2 | WEIGHT: 190 LBS | HEIGHT: 64 IN | DIASTOLIC BLOOD PRESSURE: 80 MMHG | RESPIRATION RATE: 16 BRPM

## 2023-09-28 PROCEDURE — 99214 OFFICE O/P EST MOD 30 MIN: CPT

## 2023-10-05 ENCOUNTER — APPOINTMENT (OUTPATIENT)
Dept: VASCULAR SURGERY | Facility: CLINIC | Age: 63
End: 2023-10-05
Payer: COMMERCIAL

## 2023-10-05 VITALS
BODY MASS INDEX: 32.44 KG/M2 | SYSTOLIC BLOOD PRESSURE: 150 MMHG | WEIGHT: 190 LBS | DIASTOLIC BLOOD PRESSURE: 75 MMHG | HEART RATE: 71 BPM | HEIGHT: 64 IN

## 2023-10-05 VITALS — DIASTOLIC BLOOD PRESSURE: 82 MMHG | HEART RATE: 42 BPM | SYSTOLIC BLOOD PRESSURE: 154 MMHG

## 2023-10-05 VITALS — TEMPERATURE: 97.6 F

## 2023-10-05 PROCEDURE — 93970 EXTREMITY STUDY: CPT

## 2023-10-05 PROCEDURE — 99213 OFFICE O/P EST LOW 20 MIN: CPT

## 2023-11-30 ENCOUNTER — APPOINTMENT (OUTPATIENT)
Dept: THORACIC SURGERY | Facility: CLINIC | Age: 63
End: 2023-11-30

## 2023-11-30 DIAGNOSIS — K44.9 DIAPHRAGMATIC HERNIA W/OUT OBSTRUCTION OR GANGRENE: ICD-10-CM

## 2023-12-05 RX ORDER — SODIUM BICARBONATE 84 MG/ML
8.4 INJECTION, SOLUTION INTRAVENOUS
Qty: 5 | Refills: 1 | Status: COMPLETED | COMMUNITY
Start: 2023-12-05 | End: 2023-12-15

## 2023-12-05 RX ORDER — LIDOCAINE HYDROCHLORIDE 10 MG/ML
1 INJECTION, SOLUTION INFILTRATION; PERINEURAL
Qty: 50 | Refills: 1 | Status: COMPLETED | COMMUNITY
Start: 2023-12-05 | End: 2023-12-15

## 2023-12-08 ENCOUNTER — APPOINTMENT (OUTPATIENT)
Dept: VASCULAR SURGERY | Facility: CLINIC | Age: 63
End: 2023-12-08
Payer: COMMERCIAL

## 2023-12-08 PROCEDURE — 36475 ENDOVENOUS RF 1ST VEIN: CPT | Mod: RT

## 2023-12-15 ENCOUNTER — APPOINTMENT (OUTPATIENT)
Dept: VASCULAR SURGERY | Facility: CLINIC | Age: 63
End: 2023-12-15
Payer: COMMERCIAL

## 2023-12-15 PROCEDURE — 93971 EXTREMITY STUDY: CPT | Mod: RT

## 2023-12-15 PROCEDURE — 36475 ENDOVENOUS RF 1ST VEIN: CPT | Mod: LT

## 2023-12-15 NOTE — PROCEDURE
[FreeTextEntry1] : left GSV RFA   [FreeTextEntry3] : Ms. LIU is doing well s/p radiofrequency ablation of the right great saphenous vein. Venous ultrasound done today demonstrates successful closure of the right great saphenous vein without evidence of DVT. She presents for left GSV RFA today.   Procedural safety checklist and time out completed: Confirmed patient identification (Patient Name, , and/or medical record number including when possible affirmation by patient or parent/family/other. Confirmed procedure with the patient. Consent present, accurate and signed. Confirmed special equipment and supplies are present. Sterility confirmed. Position verified. Site/ side is marked and visible and confirmed. Procedure confirmed by consent. Accurate consent including side and site. Review of medical records noting correct procedure including site and side. MD/PA verifies presence and review of imaging studies and or written report of imaging studies. Specify equipment are available for the planned procedure. MD/PA has marked the patient's procedural site and side. Agreement on the procedure to be performed Time out completed. All of the above has been confirmed by the team. All patient-specific concerns have been addressed.   Indication: left lower extremity varicose veins with inflammation, leg pain, leg swelling, and leg cramping.  Venous insufficiency/ reflux.   Procedure: radiofrequency ablation of the left great saphenous vein.   [Ms. DAYSI LIU is a 63 year old F with a history of left lower extremity varicose veins previously seen in the office.  Ultrasound examination demonstrated venous insufficiency. A trial of compression stockings, exercise, elevation, and pain medication was attempted without relief and definitive treatment with radiofrequency ablation was offered.   The patient has come for radiofrequency ablation treatment of the left saphenous vein. I have discussed the risks of the procedure at length with the patient. The risks discussed were inclusive of but not limited to infection, irritation at the site of infiltration of local anesthesia, and also rare risk of deep venous thrombosis and pulmonary emboli. The patient agrees to proceed with the procedure. The patient was escorted into the procedure room and a time out called. The entire limb was prepped and draped in sterile fashion. The RF fiber was placed on the sterile field and connected by a sterile cable. Actuation, temperature, and impedance testing were performed to ensure that all components were connected and operating properly. The patient was placed on the procedure table and local anesthesia was instilled in the skin overlying the access site. Under ultrasound guidance, the vein was punctured with a micropuncture needle, using the anterior wall technique. A guide wire was now introduced through the needle, and the needle was then exchanged over the guide wire for a 7F sheath. The guide wire was removed, and the RF probe was then placed into the saphenous vein through the sheath and position confirmed using ultrasound guidance. After the RF probe position was verified by ultrasound, tumescent anesthesia consisting of normal saline, 1% lidocaine with 8.4% sodium bicarbonate was infiltrated, under ultrasound guidance, precisely into the perivenous compartment along the entire length of the vein until a halo of fluid was noted around the vein. After RF probe position was again confirmed with ultrasound imaging, RF energy was applied. The probe was gradually and carefully withdrawn at a rate of 6.5cm/20seconds.   The total volume injected was 450 cc. Total treatment time was 100 seconds. Treatment length was 26 cm and 5 cycles of RF performed using the 7 cm probe. The probe is 3.7 cm from the SFJ.   Estimated Blood Loss: minimal Repeat ultrasound of the treated vein was performed confirming successful treatment. The catheter and sheath were withdrawn and hemostasis established with direct pressure. After assuring hemostasis, a sterile 4x4 was placed on the access site and an ACE compression wrap was applied. Patient tolerated procedure well. Patient was given post-procedure instructions and follow up appointment was scheduled.

## 2023-12-22 ENCOUNTER — APPOINTMENT (OUTPATIENT)
Dept: VASCULAR SURGERY | Facility: CLINIC | Age: 63
End: 2023-12-22
Payer: COMMERCIAL

## 2023-12-22 PROCEDURE — 37765 STAB PHLEB VEINS XTR 10-20: CPT | Mod: RT

## 2023-12-22 PROCEDURE — 93971 EXTREMITY STUDY: CPT | Mod: LT

## 2023-12-22 NOTE — PROCEDURE
[FreeTextEntry1] : right stab phlebectomy [FreeTextEntry3] : Ms. DAYSI LIU is s/p endovenous ablation of her left great saphenous vein. Post-procedure venous duplex demonstrates successful closure of the left great saphenous vein without evidence of DVT. She presents for right stab phlebectomy     Procedural safety checklist and time out completed: Confirmed patient identification (Patient Name, , and/or medical record number including when possible affirmation by patient or parent/family/other. Confirmed procedure with the patient. Consent present, accurate and signed. Confirmed special equipment and supplies are present. Sterility confirmed. Position verified. Site/ side is marked and visible and confirmed. Procedure confirmed by consent. Accurate consent including side and site. Review of medical records noting correct procedure including site and side. MD/PA verifies presence and review of imaging studies and or written report of imaging studies. Specify equipment are available for the planned procedure. MD/PA has marked the patient's procedural site and side. Agreement on the procedure to be performed Time out completed. All of the above has been confirmed by the team. All patient-specific concerns have been addressed.   Indication:  right lower extremity varicose veins with inflammation, leg pain, leg swelling, and leg cramping.    Procedure: Stab phlebectomy right lower extremity    Ms. DAYSI LIU is a 63 year old F with a history of symptomatic right lower extremity varicose veins. A trial of compression stockings, exercise, elevation, and pain medication was attempted without relief and definitive treatment with microphlebectomy was offered.   I have discussed the risks of the procedure at length with the patient. The risks discussed were inclusive of but not limited to infection, irritation at the site of infiltration of local anesthesia, and also rare risk of deep venous thrombosis and pulmonary emboli. The patient agrees to proceed with the procedure. The patient was escorted into the procedure room, the varicose veins for treatment were marked out and the patient placed on the examination table. The entire limb was prepped and draped in sterile fashion and a  time out was called.   Local anesthesia using 25 cc 1% lidocaine was infiltrated using a 25 gauge needle over the previously marked prominent varicose vein sites. Multiple small stab incisions, each less than 1 cm in length was made at the noted sites. With the help of a vein hook, the vein was fished out at each of these sites, rolled over a narrow-tipped mosquito clamp and removed. Hemostasis was secured with leg elevation and application of manual pressure. After assuring hemostasis, a sterile 4x4 was placed on the access sites and an ACE compression wrap was applied. Estimated blood loss: minimal. Patient tolerated procedure well. Patient was given post-procedure instructions and follow up appointment was scheduled.   SIDE - RIGHT  SITE - CALF / THIGH LOCATION - MEDIAL  / ANTERIOR  Total Stab Incisions 10-20

## 2024-01-05 ENCOUNTER — APPOINTMENT (OUTPATIENT)
Dept: VASCULAR SURGERY | Facility: CLINIC | Age: 64
End: 2024-01-05
Payer: COMMERCIAL

## 2024-01-05 DIAGNOSIS — I83.893 VARICOSE VEINS OF BILATERAL LOWER EXTREMITIES WITH OTHER COMPLICATIONS: ICD-10-CM

## 2024-01-05 DIAGNOSIS — I87.2 VENOUS INSUFFICIENCY (CHRONIC) (PERIPHERAL): ICD-10-CM

## 2024-01-05 PROCEDURE — 37765 STAB PHLEB VEINS XTR 10-20: CPT | Mod: LT

## 2024-01-05 NOTE — PROCEDURE
[FreeTextEntry1] : left stab phlebectomy  [FreeTextEntry3] : Procedural safety checklist and time out completed: Confirmed patient identification (Patient Name, , and/or medical record number including when possible affirmation by patient or parent/family/other. Confirmed procedure with the patient. Consent present, accurate and signed.  Confirmed special equipment and supplies are present. Sterility confirmed. Position verified.  Site/ side is marked and visible and confirmed.  Procedure confirmed by consent. Accurate consent including side and site. Review of medical records noting correct procedure including site and side. MD/PA verifies presence and review of imaging studies and or written report of imaging studies. Specify equipment are available for the planned procedure. MD/PA has marked the patient's procedural site and side. Agreement on the procedure to be performed Time out completed. All of the above has been confirmed by the team. All patient-specific concerns have been addressed.   Indication:  left lower extremity varicose veins with inflammation, leg pain, leg swelling, and leg cramping.    Procedure: Stab phlebectomy left lower extremity   Ms. DAYSI LIU is a 63 year old F with a history of symptomatic left lower extremity varicose veins. A trial of compression stockings, exercise, elevation, and pain medication was attempted without relief and definitive treatment with microphlebectomy was offered.   I have discussed the risks of the procedure at length with the patient. The risks discussed were inclusive of but not limited to infection, irritation at the site of infiltration of local anesthesia, and also rare risk of deep venous thrombosis and pulmonary emboli. The patient agrees to proceed with the procedure.  The patient was escorted into the procedure room, the varicose veins for treatment were marked out and the patient placed on the examination table. The entire limb was prepped and draped in sterile fashion and a  time out was called.   Local anesthesia using 20 cc 1% lidocaine was infiltrated using a 25 gauge needle over the previously marked prominent varicose vein sites. Multiple small stab incisions, each less than 1 cm in length was made at the noted sites. With the help of a vein hook, the vein was fished out at each of these sites, rolled over a narrow-tipped mosquito clamp and removed. Hemostasis was secured with leg elevation and application of manual pressure. After assuring hemostasis, a sterile 4x4 was placed on the access sites and an ACE compression wrap was applied. Estimated blood loss: minimal. Patient tolerated procedure well. Patient was given post-procedure instructions and follow up appointment was scheduled.   SIDE - LEFT	 SITE - CALF  LOCATION - MEDIAL / LATERAL / ANTERIOR  Total Stab Incisions 10-20

## 2024-01-26 ENCOUNTER — APPOINTMENT (OUTPATIENT)
Dept: VASCULAR SURGERY | Facility: CLINIC | Age: 64
End: 2024-01-26
Payer: COMMERCIAL

## 2024-01-26 PROCEDURE — 99441: CPT

## 2024-01-26 NOTE — HISTORY OF PRESENT ILLNESS
[FreeTextEntry1] :  DAYSI LIU (: May 18 1960) is a 63 year old female with history of venous insufficiency.   She is s/p vein procedures: bilateral GSV RFA and bilateral stab phlebectomy.   Post-procedure ultrasounds demonstrated successful closure of the veins and no evidence of DVT.   Today the patient reports improvement in symptoms. Denies fever, chills, pain, swelling. Reports that all incision are well healed and without any issues.  She reports initially having issues with a particular incision on the LLE. States that after removing the dressings after LLE stab phlebectomy, she noticed swelling and redness on an incision above the L ankle. Denies fever, chills, drainage, purulence. She did not contact the office regarding her concerns. She called her daughter who is a physician in [facility] who advised her to apply warm compresses and take NSAIDs. Patient states that the area is much improved from before and has no active concerns.   Overall, she reports significant improvement in lower extremity symptoms and is happy with her results.    A/P: Patient is doing well s/p vein procedure. Offered f/u appointment to assess LLE incision that had initially caused her issues but patient declines as the area is much better now. She may follow up as needed.

## 2024-01-29 ENCOUNTER — TRANSCRIPTION ENCOUNTER (OUTPATIENT)
Age: 64
End: 2024-01-29

## 2024-01-29 RX ORDER — SUCRALFATE 1 G/1
1 TABLET ORAL 4 TIMES DAILY
Qty: 120 | Refills: 0 | Status: ACTIVE | COMMUNITY
Start: 2023-08-29 | End: 1900-01-01

## 2024-01-30 ENCOUNTER — APPOINTMENT (OUTPATIENT)
Dept: OTOLARYNGOLOGY | Facility: CLINIC | Age: 64
End: 2024-01-30
Payer: COMMERCIAL

## 2024-01-30 VITALS
HEART RATE: 70 BPM | HEIGHT: 64 IN | WEIGHT: 190 LBS | DIASTOLIC BLOOD PRESSURE: 74 MMHG | BODY MASS INDEX: 32.44 KG/M2 | SYSTOLIC BLOOD PRESSURE: 134 MMHG

## 2024-01-30 DIAGNOSIS — K21.9 GASTRO-ESOPHAGEAL REFLUX DISEASE W/OUT ESOPHAGITIS: ICD-10-CM

## 2024-01-30 PROCEDURE — 31575 DIAGNOSTIC LARYNGOSCOPY: CPT

## 2024-01-30 PROCEDURE — 99204 OFFICE O/P NEW MOD 45 MIN: CPT | Mod: 25

## 2024-01-30 NOTE — PROCEDURE
[FreeTextEntry6] : Nasal Endoscopy: Procedure: Bilateral nasal endoscopy (CPT 13962)   Indication: Anterior rhinoscopy was inadequate to evaluate pathology.  Left: Septum deviated R  Moderate inferior turbinate hypertrophy  Middle meatus clear, without masses, polyps, or pus Sphenoethmoidal recess clear, without masses, polyps, or pus  Right:  Septum deviated R   Moderate inferior turbinate hypertrophy Middle meatus clear, without masses, polyps, or pus  Sphenoethmoidal recess clear, without masses, polyps, or pus [de-identified] : Laryngoscopy: NPL: Nasopharynx clear without masses Base of tongue without masses or lesions Vallecula clear Pyriforms clear Epiglottis crisp TVFs mobile bilaterally  Interarytenoid erythema, no edema Glottic airway patent

## 2024-01-30 NOTE — CONSULT LETTER
[Dear  ___] : Dear  [unfilled], [Consult Letter:] : I had the pleasure of evaluating your patient, [unfilled]. [Please see my note below.] : Please see my note below. [Consult Closing:] : Thank you very much for allowing me to participate in the care of this patient.  If you have any questions, please do not hesitate to contact me. [Sincerely,] : Sincerely, [FreeTextEntry2] : Ori Anglin [FreeTextEntry3] : Toño Francisco MD, CHERYL  Otolaryngology  Sinus and Endoscopic Skull Base Surgery  Head and Neck Surgery   [address], [address]  [place] [address]  Tel: [phone]  Fax:[phone]  WILL Padron, PA-C Department of Otolaryngology [facility] Otolaryngology at [place]

## 2024-01-30 NOTE — HISTORY OF PRESENT ILLNESS
[de-identified] : 63F presenting with globus sensation. She wakes up with a sensation in the AM and has trouble swallowing pills. She also has throat clearing and hoarse voice. She takes famotidine daily at night. Former smoker; quit 20 years ago, and no alcohol use. Endoscopy, manometry, and MBS in 6/2023 which are reported normal. She has had 2 hiatal hernia repairs. She continues to have reflux. She has a hx of barretts.

## 2024-01-30 NOTE — PLAN
[TextEntry] : Reflux precautions and behavioral modifications were discussed with patient including weight loss, the avoidance of caffeine, alcohol and tobacco ingestion, avoidance of eating within 3 hours of retiring for sleep, and elevation of the head of the bed. We will stop her pepcid and start the PPI. She has had multiple studies already which show mechanically she is functioning well.   Follow up in 3 months.

## 2024-02-16 ENCOUNTER — APPOINTMENT (OUTPATIENT)
Dept: NEUROLOGY | Facility: CLINIC | Age: 64
End: 2024-02-16

## 2024-02-28 ENCOUNTER — APPOINTMENT (OUTPATIENT)
Dept: INTERNAL MEDICINE | Facility: CLINIC | Age: 64
End: 2024-02-28
Payer: COMMERCIAL

## 2024-02-28 VITALS
RESPIRATION RATE: 15 BRPM | HEART RATE: 75 BPM | OXYGEN SATURATION: 97 % | BODY MASS INDEX: 33.46 KG/M2 | TEMPERATURE: 97.3 F | WEIGHT: 196 LBS | SYSTOLIC BLOOD PRESSURE: 154 MMHG | DIASTOLIC BLOOD PRESSURE: 76 MMHG | HEIGHT: 64 IN

## 2024-02-28 DIAGNOSIS — M25.529 PAIN IN UNSPECIFIED ELBOW: ICD-10-CM

## 2024-02-28 PROCEDURE — 99214 OFFICE O/P EST MOD 30 MIN: CPT

## 2024-02-28 RX ORDER — PANTOPRAZOLE SODIUM 40 MG/1
40 GRANULE, DELAYED RELEASE ORAL
Refills: 0 | Status: DISCONTINUED | COMMUNITY
End: 2024-02-28

## 2024-02-28 RX ORDER — MELOXICAM 15 MG/1
15 TABLET ORAL
Qty: 15 | Refills: 0 | Status: DISCONTINUED | COMMUNITY
Start: 2023-07-28 | End: 2024-02-28

## 2024-02-28 NOTE — ASSESSMENT
[FreeTextEntry1] : Physical Exam: Constitutional: No acute distress, well appearing HEENT: Normocephalic, atraumatic Neck: supple Cardiac:  Regular rate and rhythm, No murmurs Pulmonary: No respiratory distress, Lungs clear to auscultation bilaterally, no wheezing, rales, or rhonchi Abdomen: Soft, non-tender, non-distended, no guarding, normal bowel sounds Vascular: No peripheral edema Neurology: Coordination grossly intact, no focal deficits Psychiatric: Alert and oriented x3, normal mood    A/P:.  neck/ shoulder/ elbow pain paraspinal muscles boggy and TTP of her L trap muscle on ROM of arms, pt appearing very tense and using her traps to bring up her arms negative prayer/ phalen test abduction of arm triggering pain likely musculoskeletal pain but will need to r/o other etiology - will get XR cervical spine for comparison as well as xr shoulder and elbow. - have sent robaxin, advised to take at bedtime - diclofenac gel  - c/w nsaid sparingly and with food. can take tylenol - rec she f/u with physiatry for further evaluation and tx  HTN bp elevated in office states bp has been in 150 systolic for the last couple of weeks  she has been in pain - rec for now tx of her arm/ neck but rec she start checking her bp daily at home and f/u sooner if bp at home consistently > 140/ or >/90 - she already has upcoming cpe scheduled with her PCP - salt reduction, avoid frequent nsaid use, weight loss advised

## 2024-02-28 NOTE — HISTORY OF PRESENT ILLNESS
[FreeTextEntry8] : DAYSI LIU is a 63 year old female with PMHx HTN, HLD, predm, presenting for neck, shoulder, elbow pain.    she has pain that radiates down from her neck to b/l arms, worse on her left side. going on for 6 months. but now her L elbow and shoulder also hurt for the last month.  hands get numb at night when laying down. worse when she lays on her left side. numbness spares her 5th digits. states she tried PT but didnt help. denies weakness but she feels like she's losing ROM bc of the pain. abduction of her arm hurts most. she takes motrin and tylenol which helps. tried hot compresses and lidocaine patch.   xr spine 7/2021 mild deg changes  PCP Dr Anglin

## 2024-02-29 ENCOUNTER — APPOINTMENT (OUTPATIENT)
Dept: RADIOLOGY | Facility: CLINIC | Age: 64
End: 2024-02-29
Payer: COMMERCIAL

## 2024-02-29 ENCOUNTER — OUTPATIENT (OUTPATIENT)
Dept: OUTPATIENT SERVICES | Facility: HOSPITAL | Age: 64
LOS: 1 days | End: 2024-02-29
Payer: COMMERCIAL

## 2024-02-29 DIAGNOSIS — M54.2 CERVICALGIA: ICD-10-CM

## 2024-02-29 DIAGNOSIS — M25.512 PAIN IN LEFT SHOULDER: ICD-10-CM

## 2024-02-29 PROCEDURE — 73030 X-RAY EXAM OF SHOULDER: CPT | Mod: 26,LT

## 2024-02-29 PROCEDURE — 72050 X-RAY EXAM NECK SPINE 4/5VWS: CPT | Mod: 26

## 2024-02-29 PROCEDURE — 73080 X-RAY EXAM OF ELBOW: CPT | Mod: 26,LT

## 2024-02-29 PROCEDURE — 73080 X-RAY EXAM OF ELBOW: CPT

## 2024-02-29 PROCEDURE — 72050 X-RAY EXAM NECK SPINE 4/5VWS: CPT

## 2024-02-29 PROCEDURE — 73030 X-RAY EXAM OF SHOULDER: CPT

## 2024-03-03 ENCOUNTER — TRANSCRIPTION ENCOUNTER (OUTPATIENT)
Age: 64
End: 2024-03-03

## 2024-03-12 ENCOUNTER — APPOINTMENT (OUTPATIENT)
Dept: PHYSICAL MEDICINE AND REHAB | Facility: CLINIC | Age: 64
End: 2024-03-12
Payer: COMMERCIAL

## 2024-03-12 VITALS
SYSTOLIC BLOOD PRESSURE: 120 MMHG | HEART RATE: 74 BPM | HEIGHT: 64 IN | DIASTOLIC BLOOD PRESSURE: 74 MMHG | BODY MASS INDEX: 33.46 KG/M2 | RESPIRATION RATE: 14 BRPM | WEIGHT: 196 LBS

## 2024-03-12 DIAGNOSIS — M67.912 UNSPECIFIED DISORDER OF SYNOVIUM AND TENDON, LEFT SHOULDER: ICD-10-CM

## 2024-03-12 DIAGNOSIS — M25.512 PAIN IN LEFT SHOULDER: ICD-10-CM

## 2024-03-12 DIAGNOSIS — M54.2 CERVICALGIA: ICD-10-CM

## 2024-03-12 DIAGNOSIS — M79.18 MYALGIA, OTHER SITE: ICD-10-CM

## 2024-03-12 PROCEDURE — 99204 OFFICE O/P NEW MOD 45 MIN: CPT

## 2024-03-12 RX ORDER — METHOCARBAMOL 500 MG/1
500 TABLET, FILM COATED ORAL
Qty: 14 | Refills: 0 | Status: COMPLETED | COMMUNITY
Start: 2024-02-28 | End: 2024-03-12

## 2024-03-12 RX ORDER — DICLOFENAC SODIUM 1% 10 MG/G
1 GEL TOPICAL DAILY
Qty: 3 | Refills: 2 | Status: ACTIVE | COMMUNITY
Start: 2024-02-28 | End: 1900-01-01

## 2024-03-12 NOTE — PHYSICAL EXAM
[FreeTextEntry1] : GEN: AAOx3, NAD. CV: well perfused LUNGS: nonlabored breathing EXT: No C/C/E. SKIN: No lesions noted. LYMPH: No supraclavicular, anterior or posterior cervical lymphadenopathy appreciated. GAIT: Non antalgic, normal reciprocating heel to toe. INSP: No Atrophy, Bony Deformity, Swelling. Shoulder height symmetric. Normal Thoracic Kyphosis. Cervical ROM: pain with left rotation and side bending  Shoulder AROM: pain with left shoulder flexion and abduction  PALP: tender over left anterior shoulder, left upper trap, left lower cervical paraspinals,  palpable bands of taut muscles that reproduce pain pattern with palpation STRENGTH: 5/5 bilateral shoulder abductors, internal rotation, external rotation, elbow flexors, elbow extensors, wrist extensors, long finger flexors, hand intrinsics, . TONE: Normal, No clonus. SENSATION: Normal to light touch in the bilateral upper extremities. REFLEXES: 2+ symmetric biceps, triceps, brachioradialis, pronator teres. Mohamud negative bilaterally. SPECIAL cross arm/scarf test: negative BL Neers: positive L Hernandez ; positive L Empty Can: positive L Speeds: negative BL Castanon's negative BL Drop arm: negative BL Spurling's negative bilaterally. cervical facet loading mild pos left Tinel's positive left.

## 2024-03-12 NOTE — ASSESSMENT
[FreeTextEntry1] : Ms. DAYSI LIU is a 63 year female who presents with chronic left neck and shoulder pain. On examination, there are no focal neurologic deficits. She has palpable bands of taut muscle that reproduce pain on palpation over her left lower cervical paraspinal muscles, upper traps and rhomboids. She also has positive left empty can, neer's and hawkin's, indicative of left shoulder rotator cuff tendinopathy. wrist and forearm pain is reproduced with Tinel's. Discussed with patient that there is likely a multifactorial etiology of her pain including cervical myofascial pain, left shoulder rotator cuff tendinopathy and possible carpal tunnel syndrome vs cervical radiculopathy as there is negative spurling's and she does not report her pain in a specific dermatomal pattern.     Impression: left neck pain left shoulder pain left hand pain cervical myofascial pain left rotator cuff tendinopathy ?left CTS  Plan:  - medicine notes reviewd - XR C spine and L shoulder reviewed - PT referral for L shoulder rotator cuff tendinopathy, emphasis on adherence to HEP - Start medrol dose pack, take as directed on box, rx sent.  Patient warned to avoid use of PO NSAIDS while on oral steroids. Possible side effects, including hyperglycemia, GI upset, and GI bleed, reviewed with patient. Patient instructed to immediately stop medication should she develop any abdominal pain, nausea, vomiting, bloody stools, or BRBPR.  - continue diclofenac gel qhs once medrol dose pack is completed, refill provided - continue tylenol 500-1000mg TID PRN, do not exceed 3g/day  - submit auth for cervical TPI - if pain does not improve will consider MR C spine vs L shoulder, +/- EMG/NCS. - RTC in 2 weeks for TPI     The patient expressed verbal understanding and is in agreement with the plan of care. All of the patient's questions and concerns were addressed during today's visit.

## 2024-03-12 NOTE — REVIEW OF SYSTEMS
[Fever] : no fever [Chest Pain] : no chest pain [Chills] : no chills [Shortness Of Breath] : no shortness of breath [FreeTextEntry9] : +neck pain +left shoulder pain

## 2024-03-12 NOTE — DATA REVIEWED
[Plain X-Rays] : plain X-Rays [FreeTextEntry1] :  	 EXAM: [id number] - XR C SPINE W OBLIQUES 4-5V  - ORDERED BY:  Piczo   PROCEDURE DATE:  02/29/2024    INTERPRETATION:  CLINICAL INDICATION: neck pain with bilateral upper extremity paresthesias for approximately 6 months  EXAM: AP lateral open mouth AP and bilateral oblique cervical spine from 2/29/2024 at 0911. No similar prior studies available for comparison.  IMPRESSION: No compression fractures or spondylolistheses.  Variable thickness C4-C7 anterior disc margin osteophytes/ligamentous ossifications. Otherwise relatively preserved intervertebral disc spaces. Mild upper cervical posterior facet arthrosis.  Slightly narrowed bilateral C4-C5 neural foramina. Patent remaining cervical neural foramina however better assessed on CT or MRI.  Prevertebral soft tissues and predental interval within normal limits. Atlantoaxial and posterior facet alignment maintained. Intact odontoid. No discrete lytic or blastic lesions.  Clear visualized lung apices and midline normal caliber trachea.  --- End of Report ---     BRENNEN SANCHES MD; Attending Radiologist This document has been electronically signed. Mar  5 2024  6:26PM   	 EXAM: [id number] - XR SHOULDER COMP MIN 2V LT  - ORDERED BY:  Piczo   PROCEDURE DATE:  02/29/2024    INTERPRETATION:  CLINICAL INDICATION: left shoulder pain  EXAM: Internal and external rotation AP left shoulder from 2/29/2024 at 0912. No similar prior studies available for comparison.  IMPRESSION: No fractures, dislocations, or AC separation.  Narrowed appearing AC joint space. Preserved glenohumeral joint space.  Maintained subacromial and coracoclavicular spaces.  No destructive osseous lesions or periosteal reaction.  No periarticular soft tissue calcifications.  --- End of Report ---       BRENNEN SANCHES MD; Attending Radiologist This document has been electronically signed. Mar  5 2024  6:32PM

## 2024-03-12 NOTE — HISTORY OF PRESENT ILLNESS
[FreeTextEntry1] : Ms. DAYSI LIU is a 63 year female who presents with neck pain and shoulder pain      HPI  03/12/2024 Location: neck  Onset: sept 2023, denies injury or trauma Provocation/Palliative: worse at night, laying on her left side, wakes her up, looking over her left shoulder while driving,  Quality: constant, pinching, pins and needles in the arm, aching in the neck  Radiation: LUE to the elbow, numbness in 1st 4 fingers    Severity:  8/10   Denies any associated leg weakness. Denies any loss of bowel/bladder control or any groin numbness.   Current pain medications: methocarbamol 500mg qhs - drowsy  diclofenac gel - helpful    Previous medications trialed:  tylenol  advil   Previous procedures relevant to complaint: Injections:    hx of knee viscosupplementation and CSI  Surgery:   L TKA (Dr. Shelton)    Conservative therapy tried: Physical Therapy: + not helpful     Diagnostic studies reviewed by me: XR C spine - straightening of the cervical lordosis, mild degenerative changes, anterior osteophytes At C4 C5 XR L shoulder - mild AC arthrosis

## 2024-03-26 ENCOUNTER — APPOINTMENT (OUTPATIENT)
Dept: PHYSICAL MEDICINE AND REHAB | Facility: CLINIC | Age: 64
End: 2024-03-26

## 2024-03-29 NOTE — H&P PST ADULT - HEART RATE (BEATS/MIN)
Pt presents from home with c.o headache that she took home migraine medication for at 1430 with no relief. Hx of HTN and DM   60

## 2024-04-12 ENCOUNTER — TRANSCRIPTION ENCOUNTER (OUTPATIENT)
Age: 64
End: 2024-04-12

## 2024-04-30 ENCOUNTER — APPOINTMENT (OUTPATIENT)
Dept: FAMILY MEDICINE | Facility: CLINIC | Age: 64
End: 2024-04-30

## 2024-05-15 ENCOUNTER — APPOINTMENT (OUTPATIENT)
Dept: INTERNAL MEDICINE | Facility: CLINIC | Age: 64
End: 2024-05-15
Payer: MEDICARE

## 2024-05-15 VITALS
HEART RATE: 79 BPM | TEMPERATURE: 97.8 F | OXYGEN SATURATION: 97 % | HEIGHT: 64 IN | RESPIRATION RATE: 14 BRPM | WEIGHT: 200 LBS | SYSTOLIC BLOOD PRESSURE: 152 MMHG | DIASTOLIC BLOOD PRESSURE: 81 MMHG | BODY MASS INDEX: 34.15 KG/M2

## 2024-05-15 VITALS — SYSTOLIC BLOOD PRESSURE: 148 MMHG | DIASTOLIC BLOOD PRESSURE: 70 MMHG

## 2024-05-15 DIAGNOSIS — I10 ESSENTIAL (PRIMARY) HYPERTENSION: ICD-10-CM

## 2024-05-15 DIAGNOSIS — E66.9 OBESITY, UNSPECIFIED: ICD-10-CM

## 2024-05-15 DIAGNOSIS — R42 DIZZINESS AND GIDDINESS: ICD-10-CM

## 2024-05-15 DIAGNOSIS — K21.9 GASTRO-ESOPHAGEAL REFLUX DISEASE W/OUT ESOPHAGITIS: ICD-10-CM

## 2024-05-15 DIAGNOSIS — R73.03 PREDIABETES.: ICD-10-CM

## 2024-05-15 PROCEDURE — 36415 COLL VENOUS BLD VENIPUNCTURE: CPT

## 2024-05-15 PROCEDURE — G0447 BEHAVIOR COUNSEL OBESITY 15M: CPT | Mod: 59

## 2024-05-15 PROCEDURE — G0444 DEPRESSION SCREEN ANNUAL: CPT | Mod: 59

## 2024-05-15 PROCEDURE — 99215 OFFICE O/P EST HI 40 MIN: CPT

## 2024-05-15 RX ORDER — VENLAFAXINE HYDROCHLORIDE 75 MG/1
75 CAPSULE, EXTENDED RELEASE ORAL DAILY
Qty: 1 | Refills: 0 | Status: ACTIVE | COMMUNITY
Start: 2022-08-17 | End: 1900-01-01

## 2024-05-15 RX ORDER — VALSARTAN 320 MG/1
320 TABLET, COATED ORAL DAILY
Qty: 90 | Refills: 0 | Status: ACTIVE | COMMUNITY
Start: 1900-01-01 | End: 1900-01-01

## 2024-05-15 RX ORDER — AMLODIPINE BESYLATE 10 MG/1
10 TABLET ORAL
Qty: 90 | Refills: 0 | Status: ACTIVE | COMMUNITY
Start: 1900-01-01 | End: 1900-01-01

## 2024-05-15 RX ORDER — OMEPRAZOLE 20 MG/1
20 TABLET, DELAYED RELEASE ORAL DAILY
Qty: 90 | Refills: 0 | Status: ACTIVE | COMMUNITY
Start: 2024-01-30 | End: 1900-01-01

## 2024-05-15 RX ORDER — METHYLPREDNISOLONE 4 MG/1
4 TABLET ORAL
Qty: 1 | Refills: 0 | Status: DISCONTINUED | COMMUNITY
Start: 2024-03-12 | End: 2024-05-15

## 2024-05-15 NOTE — REVIEW OF SYSTEMS
[Headache] : no headache [Dizziness] : dizziness [Fainting] : no fainting [Memory Loss] : no memory loss [Negative] : Gastrointestinal

## 2024-05-15 NOTE — HISTORY OF PRESENT ILLNESS
[FreeTextEntry8] : Ms. DAYSI LIU is a 63 year female She is a pt. of Dr Anglin Patient presented today to follow-up on blood pressure and get medication refill. Patient stated she is  [place], she has been under a lot of stress she has been doing packing, she is feeling dizzy mostly in the morning for last few days. She is concerned about her blood sugar patient stated she is constantly gaining weight she knows that she is not eating right.

## 2024-05-15 NOTE — HEALTH RISK ASSESSMENT
[No] : No [Little interest or pleasure doing things] : 1) Little interest or pleasure doing things [Feeling down, depressed, or hopeless] : 2) Feeling down, depressed, or hopeless [0] : 2) Feeling down, depressed, or hopeless: Not at all (0) [PHQ-2 Negative - No further assessment needed] : PHQ-2 Negative - No further assessment needed [Audit-CScore] : 0 [[id number]] : 0

## 2024-05-15 NOTE — COUNSELING
[Potential consequences of obesity discussed] : Potential consequences of obesity discussed [Benefits of weight loss discussed] : Benefits of weight loss discussed [Encouraged to maintain food diary] : Encouraged to maintain food diary [Encouraged to increase physical activity] : Encouraged to increase physical activity [Encouraged to use exercise tracking device] : Encouraged to use exercise tracking device [Decrease Portions] : decrease portions [____ min/wk Activity] : [unfilled] min/wk activity [Keep Food Diary] : keep food diary [FreeTextEntry4] : 15

## 2024-05-15 NOTE — ASSESSMENT
[FreeTextEntry1] : HTN Blood pressure was elevated on triage I manually rechecked it is 148/70 she is currently on valsartan 320 mg and amlodipine 10 mg. Patient will continue current medication renewed prescription. Advise low-salt diet exercise.  Prediabetes last A1c 5.9. Patient has Gained weight since then. Ordered HbA1c.  obesity: BMI:34 Advised low carb , Mediterranean diet,avoid carbonated beverage , added sugar and sweets. exercise min 150 min/wk. portion control, maintain a food diary. drink plenty of water.   chronic gerd: Symptoms are better controlled on omeprazole 20 mg and sucralfate. Renewed prescription.  Dizziness Ordered HbA1c, CBC, CMP.  Advised patient to increase water intake.

## 2024-05-20 ENCOUNTER — TRANSCRIPTION ENCOUNTER (OUTPATIENT)
Age: 64
End: 2024-05-20

## 2024-05-20 LAB
ALBUMIN SERPL ELPH-MCNC: 4.7 G/DL
ALP BLD-CCNC: 106 U/L
ALT SERPL-CCNC: 21 U/L
ANION GAP SERPL CALC-SCNC: 12 MMOL/L
AST SERPL-CCNC: 20 U/L
BASOPHILS # BLD AUTO: 0.01 K/UL
BASOPHILS NFR BLD AUTO: 0.1 %
BILIRUB SERPL-MCNC: 0.4 MG/DL
BUN SERPL-MCNC: 13 MG/DL
CALCIUM SERPL-MCNC: 9.5 MG/DL
CHLORIDE SERPL-SCNC: 105 MMOL/L
CO2 SERPL-SCNC: 24 MMOL/L
CREAT SERPL-MCNC: 0.61 MG/DL
EGFR: 100 ML/MIN/1.73M2
EOSINOPHIL # BLD AUTO: 0.04 K/UL
EOSINOPHIL NFR BLD AUTO: 0.6 %
ESTIMATED AVERAGE GLUCOSE: 128 MG/DL
GLUCOSE SERPL-MCNC: 91 MG/DL
HBA1C MFR BLD HPLC: 6.1 %
HCT VFR BLD CALC: 38.9 %
HGB BLD-MCNC: 12.5 G/DL
IMM GRANULOCYTES NFR BLD AUTO: 0.1 %
LYMPHOCYTES # BLD AUTO: 2.05 K/UL
LYMPHOCYTES NFR BLD AUTO: 29.4 %
MAN DIFF?: NORMAL
MCHC RBC-ENTMCNC: 27.9 PG
MCHC RBC-ENTMCNC: 32.1 GM/DL
MCV RBC AUTO: 86.8 FL
MONOCYTES # BLD AUTO: 0.54 K/UL
MONOCYTES NFR BLD AUTO: 7.7 %
NEUTROPHILS # BLD AUTO: 4.33 K/UL
NEUTROPHILS NFR BLD AUTO: 62.1 %
PLATELET # BLD AUTO: 239 K/UL
POTASSIUM SERPL-SCNC: 4.5 MMOL/L
PROT SERPL-MCNC: 7.4 G/DL
RBC # BLD: 4.48 M/UL
RBC # FLD: 14.6 %
SODIUM SERPL-SCNC: 141 MMOL/L
WBC # FLD AUTO: 6.98 K/UL

## 2024-05-21 ENCOUNTER — APPOINTMENT (OUTPATIENT)
Dept: INTERNAL MEDICINE | Facility: CLINIC | Age: 64
End: 2024-05-21

## 2024-10-10 NOTE — HISTORY OF PRESENT ILLNESS
0 = swallows foods/liquids without difficulty [FreeTextEntry1] : 61 year old female with hx of hypertension obesity GERD hiatal hernia s/p covid jan 2022 came for follow up says she still continue to have palpitations ,but no chest pain  patient  had echo showed normal EF mild DD ,  normal chemical stress test \par \par patient did not have holter yet \par \par patient  blood work , showed normal troponin ,  no evidence of PE OR  dissection ,   patient is able to sleep well , as her knee pain got better    \par \par hospital records reviewed ,  normal troponin , normal Blood work   her blood pressure is mild elevated as she was not compliant to low salt diet \par \par patient does have extensive varicosities \par \par \par

## 2025-01-15 NOTE — PATIENT PROFILE ADULT - SURGICAL SITE DRAIN
General Surgery Office Note  [facility]  Consandsavi Jett MD    Patient's Name/Date of Birth: Cherrie Weems / 1960    Date: January 15, 2025     Surgeon: MD Johnie    Chief Complaint: No chief complaint on file.      Patient Active Problem List   Diagnosis    Chronic hypertension    Hyperlipidemia    Vitamin D deficiency    Panlobular emphysema (HCC)    Perforated viscus    Pneumoperitoneum    Acute hyponatremia    Peritonitis (HCC)    Hypokalemia    Hyponatremia    Elevated platelet count    Anemia associated with acute blood loss    Colostomy in place (HCC)    S/P colostomy takedown       Subjective: doing well s/p colostomy reversal. Having normal gi function. No complaints     Objective:  There were no vitals taken for this visit.  Labs:  No results for input(s): \"WBC\", \"HGB\", \"HCT\" in the last 72 hours.    Invalid input(s): \"PLR\"  Lab Results   Component Value Date    CREATININE 0.8 12/14/2024    BUN 11 12/14/2024     12/14/2024    K 4.5 12/14/2024     12/14/2024    CO2 23 12/14/2024     No results for input(s): \"LIPASE\", \"AMYLASE\" in the last 72 hours.    General appearance: AA, NAD  HEENT: NCAT, PERRLA, EOMI  Lungs: Clear, equal rise bilateral  Heart: Reg  Abdomen: soft, nondistended, nontender, incisions well healed, no signs of infection, no cellulitis, no hematoma  Skin: No lesions, incisions well healed  Psych: No distress, conversive, no hallucinations  : No ulcers or lesions  Rectal: No bleeding    A complete 10 system review was performed and are otherwise negative unless mentioned in the above HPI. Specific negatives are listed below but may not include all those reviewed.    General ROS: negative obtundation, AMS  ENT ROS: negative rhinorrhea, epistaxis  Allergy and Immunology ROS: negative itchy/watery eyes or nasal congestion  Hematological and Lymphatic ROS: negative spontaneous bleeding or bruising  Endocrine ROS: negative  lethargy, mood swings, palpitations or  no

## 2025-04-03 NOTE — OCCUPATIONAL THERAPY INITIAL EVALUATION ADULT - TRANSFER SAFETY CONCERNS NOTED: TOILET, REHAB EVAL
MEDICARE WELLNESS VISIT + SOAP NOTE    Patient Care Team:  Lori Torres APNP as PCP - General (Nurse Practitioner - Family)    Isa presents for her Subsequent Annual Medicare Wellness Visit with Medicare Wellness Visit (Patient is here for MWV/Patient needs a referral for Colonoscopy and Mammogram), Imm/Inj (No shingles vacc), and Other (Patient is due for Colonoscopy)       Status MWV Topics Details (Refresh Note to Update)    Depression Screening (Trend)   PHQ2 Interpretation Negative          PHQ2: Score = 0      Mild symptoms, will monitor and reassess at next visit. Patient instructed to contact our office if symptoms worsen.    Blood Pressure  Weight  BMI     /78  Current Weight 165 lbs  body mass index is 25.09 kg/m².  BMI is within normal range.      Advanced Directives on File Not on file. Needs to bring in a copy.      Health Risk Assessment  (Click to Review or Edit)   Completed      Falls Risk  Have you had a fall in the past year?................................. No  Do you feel unsteady when standing or walking?........ No  Do you worry about falling?.................................................. No       Tobacco/Alcohol/Drugs Every Day Smoker   Tobacco Pack Years 25   reports no history of alcohol use.   has no history on file for drug use.      Opioid Review (Update Meds)    No opioid on med list.      Cognitive/Functional Status  (Optional MOCA  Mini Cog)   No evidence of cognitive dysfunction by direct observation.    Vision and Hearing Screens    Not applicable      Health Maintenance (Link)  See patient instructions and orders           The following items on the Medicare Health Risk Assessment were found to be positive  14.) During the past 4 weeks, was someone available to help if you needed and wanted help?: Yes, a little       I reviewed and updated the past Medical, Surgical, Family, Social History, Allergies and Medications in Epic: Yes    Family History   Problem Relation  Age of Onset    Cancer, Colon Maternal Aunt     Cancer, Breast Half-Sister     Cancer, Ovarian Half-Sister          SOAP NOTE       Subjective     Isa is a 73 year old here for Medicare Wellness Visit (Patient is here for MWV/Patient needs a referral for Colonoscopy and Mammogram), Imm/Inj (No shingles vacc), and Other (Patient is due for Colonoscopy)  Isa Green is a 71 year old female medical history of hypertension, multiple thyroid nodules, depression ,who presents today for Medicare wellness    Alert awake oriented x 3.  Not in any acute distress    Noticing urinary urgency  No blood in the urine  Low back pain  Pelvic fullness    Patient feels mildly depressed.  But she states she is okay.  Her  recently got sick  She is mostly taking care of her     Not exercising much    But she is planning to do more exercise as the winter weather gets better          Review of Systems   Constitutional:  Positive for fatigue. Negative for chills and fever.   Respiratory:  Negative for cough, chest tightness and shortness of breath.    Gastrointestinal:  Negative for abdominal distention, abdominal pain, anal bleeding, blood in stool, constipation, diarrhea, nausea, rectal pain and vomiting.   Genitourinary:  Positive for urgency.   Musculoskeletal:  Positive for arthralgias and back pain.        Heaviness in pelvis      Skin:  Negative for rash.   Neurological:  Negative for dizziness, syncope, weakness, light-headedness, numbness and headaches.   Psychiatric/Behavioral:  Positive for sleep disturbance.          SDOH Every Day Smoker   Tobacco Pack Years 25    Objective   Vitals:    04/03/25 0807   BP: 130/78   Pulse: 70   Resp: 16   Temp: 97.8 °F (36.6 °C)   TempSrc: Temporal   SpO2: 97%   Weight: 74.8 kg (165 lb)   Height: 5' 8\" (1.727 m)   BMI (Calculated): 25.09     Physical Exam  Vitals and nursing note reviewed.   Constitutional:       General: She is not in acute distress.     Appearance: She is  well-developed.   HENT:      Right Ear: Tympanic membrane normal.      Left Ear: Tympanic membrane normal.      Mouth/Throat:      Pharynx: No oropharyngeal exudate.      Neck: Normal range of motion and neck supple.   Eyes:      General:         Right eye: No discharge.         Left eye: No discharge.      Conjunctiva/sclera: Conjunctivae normal.      Pupils: Pupils are equal, round, and reactive to light.   Cardiovascular:      Rate and Rhythm: Normal rate and regular rhythm.      Heart sounds: Normal heart sounds. No murmur heard.  Pulmonary:      Effort: Pulmonary effort is normal. No respiratory distress.      Breath sounds: Normal breath sounds. No wheezing or rales.   Chest:      Chest wall: No tenderness.   Abdominal:      General: Bowel sounds are normal. There is no distension.      Palpations: Abdomen is soft.      Tenderness: There is no abdominal tenderness. There is no guarding or rebound.   Musculoskeletal:         General: No tenderness. Normal range of motion.      Right lower leg: No edema.      Left lower leg: No edema.   Lymphadenopathy:      Head:      Right side of head: No submental, submandibular, tonsillar, preauricular, posterior auricular or occipital adenopathy.      Left side of head: No submental, submandibular, tonsillar, preauricular, posterior auricular or occipital adenopathy.      Cervical: Cervical adenopathy present.      Right cervical: No superficial, deep or posterior cervical adenopathy.     Left cervical: Superficial cervical adenopathy present. No deep or posterior cervical adenopathy.      Upper Body:      Right upper body: No supraclavicular adenopathy.      Left upper body: No supraclavicular adenopathy.   Skin:     General: Skin is warm.      Findings: No erythema or rash.   Neurological:      Mental Status: She is alert and oriented to person, place, and time.      Cranial Nerves: No cranial nerve deficit.      Deep Tendon Reflexes: Reflexes are normal and symmetric.    Psychiatric:         Mood and Affect: Mood normal.                  ASSESSMENT AND PLAN          1. Medicare annual wellness visit, subsequent  2. Smoker  -     CT LUNG CANCER SCREENING LOW DOSE WO CONTRAST; Future  3. Encounter for screening mammogram for malignant neoplasm of breast  -     MAMMO SCREENING BILATERAL W RAFAEL; Future  4. Personal history of nicotine dependence  -     CT LUNG CANCER SCREENING LOW DOSE WO CONTRAST; Future  5. Essential hypertension, benign  -Continue losartan hydrochlorothiazide     Lipid Panel With Reflex    6. Multiple thyroid nodules  -     Thyroid Stimulating Hormone Reflex  -     US THYROID; Future  7. ETD (Eustachian tube dysfunction), bilateral  Follows up with the ENT  8. Mild major depression (CMD)  Mildly depressed because her  is sick at this time.  But she is okay  We will monitor for now  9. Thyroid nodule  10. Prediabetes  -     Glycohemoglobin  11. Colon cancer screening  -     SERVICE TO GASTROENTEROLOGY  12. Sleep deprivation  -     SERVICE TO SLEEP MEDICINE  13. Urinary urgency  -     Urinalysis & Reflex Microscopy With Culture If Indicated  14. Leukocytosis, unspecified type  -     CBC with Automated Differential  Slightly enlarged left cervical lymph node    Advise for 30 min brisk walk per day/exercise for 5 days a week  Advise healthy diet and life style modification  Advise for Vit D and Ca  Discussed about the importance of colonoscopy   Discussed about the importance of Mammogram   Advise to follow up with opthalmology every 2 years and dentist every 6 alyssa                   decreased weight-shifting ability

## 2025-04-17 NOTE — H&P PST ADULT - TOBACCO USE
Hematology/Oncology Follow up  Note    Patient Name: Lencho Torres  Medical Record Number: [id number]    YOB: 1956   Date of Consultation: 4/17/2025   PCP: Shayan Harris MD   Other providers:      Reason for Consultation:  Lencho Torres was seen today for the diagnosis of DLBCL    Oncologic History:    2007, diagnosed with follicular lymphoma of scalp treated with 4 doses of rituximab followed by RT.  Had complete response and was on surveillance.  Recently found to have an elevated PSA.    2025 presented to the ER with worsening abdominal pain for 3 to 4 weeks.  CT showed extensive carcinomatosis with abdominal adenopathy.  Patient underwent IR guided biopsy of the peritoneal carcinomatosis That revealed diffuse large B-cell lymphoma, germinal center phenotype.    LDH elevated at 1470  Was noted to be hyponatremic, mild renal insufficiency with hypercalcemia on admission last week. CBC  Reveals normal blood counts, mild monocytosis.  ===================================================  History of Present Illness:      Patient presents to clinic.  He continues to have pain in his abdomen, distention.  Denies having any nausea or vomiting.  Limited appetite and decreased food intake causing around 20 pounds of weight loss in the last 4 to 6 weeks.  Patient is also complaining of pain below his right shoulders radiating down his right arm and left arm.    New lymph nodes developing in the right inguinal region.      Past Medical History:  Past Medical History[1]    Past Surgical History[2]    Home Medications:  Current Medications[3]  -------  Medications Ordered Prior to Encounter[4]    Allergies:   Allergies[5]    Psychosocial History:  Social History     Social History Narrative    Not on file     Short Social Hx on File[6]    Family Medical History:  Family History[7]    Review of Systems:  A 10-point ROS was done with pertinent positives and negative per the HPI    Vital Signs:  Height: --  Weight: 75.4  kg (166 lb 3.2 oz) (04/17 1314)  BSA (Calculated - sq m): --  Pulse: 119 (04/17 1314)  BP: 102/70 (04/17 1314)  Temp: 96.9 °F (36.1 °C) (04/17 1314)  Do Not Use - Resp Rate: --  SpO2: 96 % (04/17 1314)    Wt Readings from Last 6 Encounters:   04/17/25 75.4 kg (166 lb 3.2 oz)   04/09/25 81 kg (178 lb 9.6 oz)   04/08/25 80 kg (176 lb 6.4 oz)   09/01/22 79 kg (174 lb 3.2 oz)   02/07/22 83 kg (183 lb)   07/19/21 87.5 kg (193 lb)       ECOG PS: 1    Physical Examination:  General: Patient is alert and oriented, not in acute distress  Psych: Mood and affect are appropriate  Eyes: EOMI, PERRL  ENT: Oropharynx is clear, no adenopathy  CV: Regular rate and rhythm, normal S1S2, no murmurs, no LE edema  Respiratory: Lungs clear to auscultation bilaterally  GI/Abd: Soft, mild bruising noted. Distended.   Inguinal LN palpable.   Neurological: Grossly intact   Skin: no rashes or petechiae      Laboratory:  Lab Results   Component Value Date    WBC 9.7 04/10/2025    WBC 9.0 04/09/2025    WBC 7.7 03/17/2025    HGB 14.6 04/10/2025    HGB 16.0 04/09/2025    HGB 17.4 (H) 03/17/2025    HCT 41.8 04/10/2025    MCV 90.1 04/10/2025    MCH 31.5 04/10/2025    MCHC 34.9 04/10/2025    RDW 11.9 04/10/2025    .0 04/10/2025    .0 04/09/2025     03/17/2025     Lab Results   Component Value Date    GLU 89 04/10/2025    BUN 26 (H) 04/10/2025    BUNCREA SEE NOTE: 03/17/2025    CREATSERUM 1.31 (H) 04/10/2025    CREATSERUM 1.54 (H) 04/09/2025    CREATSERUM 0.90 03/17/2025    ANIONGAP 12 04/10/2025    GFRNAA 87 02/22/2016    GFRAA 100 02/22/2016    CA 10.9 (H) 04/10/2025    OSMOCALC 278 04/10/2025    ALKPHO 66 04/10/2025    AST 42 (H) 04/10/2025    ALT 22 04/10/2025    BILT 0.7 04/10/2025    TP 6.0 04/10/2025    ALB 3.9 04/10/2025    GLOBULIN 2.1 04/10/2025    AGRATIO 1.6 03/17/2025     (L) 04/10/2025    K 4.4 04/10/2025    CL 94 (L) 04/10/2025    CO2 26.0 04/10/2025     Lab Results   Component Value Date    PTT 22.6 (L)  04/09/2025    INR 1.17 04/10/2025       Imaging:    CT abdomen : Reviewed the abdominal finding, also likely mediastinal lymph nodes. .     Impression & Plan:     68-year-old male with likely advanced stage IV diffuse large B-cell lymphoma, germinal center subtype.      Diffuse large B Cell lymphoma.   - Patient had a history of follicular lymphoma of the scalp, there is a potential that this could have been a transformed to diffuse large B-cell lymphoma.  Patient is very symptomatic with night sweats, significant weight loss appetite change, abdominal pain.  - CT of the abdomen revealing peritoneal carcinomatosis. Multiple lymph nodes groups in his chest including subcarinal, prevascular space, cardiophrenic angle mass noted.  He also has omental caking, eccentric mass which is about 6.1 into 6.2 cm in size.    We will complete work up with PET CT   - Mediport placement.   Echocardiogram from 4/10 revealed normal EF  - Allopurinol 300 mg given   - Given his aggressive nature, GCB subtype this could be a double hit. FISH panel awaited  - Given his higher IPI, we will proceed with Rob-RCHP    = Hepatitis, HIV panel.   Repeat LDH     Renal insufficiency  - multifactorial, decreased intake, elevated uric acid, Hypercalcemia  We will repeat CMP.         This note was prepared using Dragon Medical voice recognition dictation software and as a result, errors may occur. When identified, these errors have been corrected. While every attempt is made to correct errors during dictation, discrepancies may still exist       Charlene Taylor MD  [facility]               [1]   Past Medical History:   Hx of lymphoma, non-Hodgkins    CUtaneous, b cell (venogopal)    Unspecified essential hypertension   [2]   Past Surgical History:  Procedure Laterality Date    Other surgical history  '12    Elk tooth    Other surgical history Right     lasik   [3]   No outpatient medications have been marked as  taking for the 25 encounter (Office Visit) with Charlene Taylor MD.   [4]   Current Outpatient Medications on File Prior to Visit   Medication Sig Dispense Refill    HYDROcodone-acetaminophen 5-325 MG Oral Tab Take 1 tablet by mouth every 6 (six) hours as needed for Pain. 20 tablet 0     Current Facility-Administered Medications on File Prior to Visit   Medication Dose Route Frequency Provider Last Rate Last Admin    [] midazolam (Versed) 2 MG/2ML injection 1 mg  1 mg Intravenous Q5 Min PRN Trav Scott MD   0.5 mg at 04/10/25 1527    [] fentaNYL (Sublimaze) 50 mcg/mL injection 50 mcg  50 mcg Intravenous Q5 Min PRN Trav Scott MD   25 mcg at 04/10/25 1525    [COMPLETED] sodium chloride 0.9 % IV bolus 1,000 mL  1,000 mL Intravenous Once Mak Mcallister DO   Stopped at 25 1506    [COMPLETED] iopamidol 76% (ISOVUE-370) injection for power injector  100 mL Intravenous ONCE PRN Mak Mcallister DO   100 mL at 25 1523    [] sodium chloride 0.9% infusion   Intravenous Continuous Mak Mcallister DO       [5]   Allergies  Allergen Reactions    Ibuprofen WHEEZING   [6]   Social History  Socioeconomic History    Marital status: Single   Occupational History    Occupation:    Tobacco Use    Smoking status: Former     Current packs/day: 0.00     Average packs/day: 1 pack/day for 22.0 years (22.0 ttl pk-yrs)     Types: Cigarettes     Start date: 5/3/1963     Quit date: 5/3/1985     Years since quittin.9    Smokeless tobacco: Never   Vaping Use    Vaping status: Never Used   Substance and Sexual Activity    Alcohol use: Yes     Alcohol/week: 0.0 standard drinks of alcohol     Comment: occ, cage 16    Drug use: No     Social Drivers of Health     Food Insecurity: No Food Insecurity (2025)    NCSS - Food Insecurity     Worried About Running Out of Food in the Last Year: No     Ran Out of Food in the Last Year: No   Transportation Needs: No Transportation Needs (2025)     NCSS - Transportation     Lack of Transportation: No   Housing Stability: Not At Risk (4/9/2025)    NCSS - Housing/Utilities     Has Housing: Yes     Worried About Losing Housing: No     Unable to Get Utilities: No   [7]   Family History  Problem Relation Age of Onset    Hypertension Brother         half brother    Lipids Brother     Stroke Mother       Former smoker

## 2025-05-08 NOTE — PHYSICAL THERAPY INITIAL EVALUATION ADULT - ADDITIONAL COMMENTS
Fasting labs tomorrow  as per pt: PTA pt was living in a PH 1st floor set up approx 5 -8 steps to enter + hand rail and was independent in all functional mobility and ADL's. no AD for gait. has a RW though/

## 2025-05-13 NOTE — ASU PATIENT PROFILE, ADULT - SUPPORT PERSON NAME
Post-Care Instructions: I reviewed with the patient in detail post-care instructions. Patient is to wear sunprotection, and avoid picking at any of the treated lesions. Pt may apply Vaseline to crusted or scabbing areas.
Show Spray Paint Technique Variable?: Yes
Detail Level: Detailed
Consent: The patient's consent was obtained including but not limited to risks of crusting, scabbing, blistering, scarring, darker or lighter pigmentary change, recurrence, incomplete removal and infection.
Spray Paint Text: The liquid nitrogen was applied to the skin utilizing a spray paint frosting technique.
Add 52 Modifier (Optional): no
Medical Necessity Information: It is in your best interest to select a reason for this procedure from the list below. All of these items fulfill various CMS LCD requirements except the new and changing color options.
Number Of Freeze-Thaw Cycles: 2 freeze-thaw cycles
Medical Necessity Clause: This procedure was medically necessary because the lesion that was treated was itchy.
DR Magen Bill
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
## (undated) DEVICE — VENODYNE/SCD SLEEVE CALF LARGE

## (undated) DEVICE — CATH IV SAFE BC 22G X 1" (BLUE)

## (undated) DEVICE — BIOPSY FORCEP RADIAL JAW 4 STANDARD WITH NEEDLE

## (undated) DEVICE — MAKO CHECKPOINT KIT FEMORAL / TIBIAL

## (undated) DEVICE — UNDERPAD LINEN SAVER 17 X 24"

## (undated) DEVICE — SUT STRATAFIX SYMMETRIC PDS PLUS 1 18" CTX VIOLET

## (undated) DEVICE — SOL IRR POUR H2O 1000ML

## (undated) DEVICE — ELCTR ECG CONDUCTIVE ADHESIVE

## (undated) DEVICE — DRSG CURITY GAUZE SPONGE 4 X 4" 12-PLY NON-STERILE

## (undated) DEVICE — DENTURE CUP PINK

## (undated) DEVICE — SYR LUER LOK 20CC

## (undated) DEVICE — DRSG 2X2

## (undated) DEVICE — SUT QUILL MONODERM 2-0 3/8 CIRCLE 45CM

## (undated) DEVICE — STRYKER INTERPULSE HANDPIECE W IRR SUCTION TUBE

## (undated) DEVICE — SALIVA EJECTOR (BLUE)

## (undated) DEVICE — LUBRICATING JELLY HR ONE SHOT 3G

## (undated) DEVICE — LINE BREATHE SAMPLNG

## (undated) DEVICE — BASIN EMESIS 10IN GRADUATED MAUVE

## (undated) DEVICE — GOWN LG

## (undated) DEVICE — SYR LUER LOK 50CC

## (undated) DEVICE — CONTAINER FORMALIN 10% 20ML

## (undated) DEVICE — HOOD FLYTE STRYKER HELMET SHIELD

## (undated) DEVICE — DRSG STOCKINETTE IMPERVIOUS XL

## (undated) DEVICE — SUT PDO 2 1/2 CIRCLE 40MM NDL 45CM

## (undated) DEVICE — SYR LUER LOK 5CC

## (undated) DEVICE — MAKO DRAPE KIT

## (undated) DEVICE — SUT VICRYL 1 27" CPX UNDYED

## (undated) DEVICE — TUBING MEDI-VAC W MAXIGRIP CONNECTORS 1/4"X6'

## (undated) DEVICE — NOZZLE TIB 90 DEG

## (undated) DEVICE — BLADE SCALPEL SAFETYLOCK #20

## (undated) DEVICE — SOL BETADINE POUCH 0.75OZ STERILE

## (undated) DEVICE — GLV 8.5 PROTEXIS (WHITE)

## (undated) DEVICE — WARMING BLANKET UPPER ADULT

## (undated) DEVICE — BIOPSY FORCEP COLD DISP

## (undated) DEVICE — TUBING SUCTION CONN 6FT STERILE

## (undated) DEVICE — FOLEY HOLDER STATLOCK 2 WAY ADULT

## (undated) DEVICE — MAKO BLADE STANDARD

## (undated) DEVICE — TOURNIQUET CUFF 34" DUAL PORT W PLC

## (undated) DEVICE — BAG DECANTER 2

## (undated) DEVICE — SOL IRR POUR NS 0.9% 500ML

## (undated) DEVICE — TUBING IV SET GRAVITY 3Y 100" MACRO

## (undated) DEVICE — CLAMP BX HOT RAD JAW 3

## (undated) DEVICE — GOWN TRIMAX LG

## (undated) DEVICE — Device

## (undated) DEVICE — NDL HYPO SAFE 22G X 1.5" (BLACK)

## (undated) DEVICE — MAKO VIZADISC KNEE TRACKING KIT

## (undated) DEVICE — CONTAINER FORMALIN 10% 60ML

## (undated) DEVICE — PACK IV START WITH CHG

## (undated) DEVICE — ELCTR BOVIE PENCIL SMOKE EVACUATION

## (undated) DEVICE — SYR LUER LOK 3CC

## (undated) DEVICE — DRSG DERMABOND 0.7ML

## (undated) DEVICE — SAW BLADE STRYKER SAGITTAL 81.5X12.5X1.19MM

## (undated) DEVICE — SPECIMEN CONTAINER 100ML

## (undated) DEVICE — HOOD FLYTE STRYKER SURGICOOL W PEELAWAY

## (undated) DEVICE — BITE BLOCK ADULT 20 X 27MM (GREEN)

## (undated) DEVICE — STRYKER MIXEVAC 3 BONE CEMENT MIXER

## (undated) DEVICE — SUCTION YANKAUER NO CONTROL VENT

## (undated) DEVICE — DRSG BANDAID 0.75X3"

## (undated) DEVICE — GLV 8.5 PROTEXIS (BLUE)

## (undated) DEVICE — FORCEP ENDOJAW AGTR LC W NDL 2.8MM 230CM DISP

## (undated) DEVICE — CONTAINER FORMALIN 80ML YELLOW

## (undated) DEVICE — SAW BLADE STRYKER SAGITTAL EXTRA WIDE THIN SHORT

## (undated) DEVICE — TUBING TUR 2 PRONG

## (undated) DEVICE — FRAZIER SUCTION TIP 18FR

## (undated) DEVICE — DRSG AQUACEL 3.5 X 12"

## (undated) DEVICE — SUT QUILL MONODERM 0 1/2 CIRCLE TAPR 45CM 26MM

## (undated) DEVICE — PACK MIS KNEE (1 PIECE)

## (undated) DEVICE — POSITIONER CARDIAC BUMP